# Patient Record
Sex: FEMALE | Race: ASIAN | NOT HISPANIC OR LATINO | URBAN - METROPOLITAN AREA
[De-identification: names, ages, dates, MRNs, and addresses within clinical notes are randomized per-mention and may not be internally consistent; named-entity substitution may affect disease eponyms.]

---

## 2018-02-13 ENCOUNTER — OUTPATIENT (OUTPATIENT)
Dept: OUTPATIENT SERVICES | Facility: HOSPITAL | Age: 63
LOS: 1 days | Discharge: HOME | End: 2018-02-13
Payer: COMMERCIAL

## 2018-02-13 VITALS
TEMPERATURE: 98 F | HEART RATE: 68 BPM | DIASTOLIC BLOOD PRESSURE: 69 MMHG | SYSTOLIC BLOOD PRESSURE: 120 MMHG | OXYGEN SATURATION: 100 % | RESPIRATION RATE: 18 BRPM | WEIGHT: 261.47 LBS | HEIGHT: 68 IN

## 2018-02-13 DIAGNOSIS — G47.33 OBSTRUCTIVE SLEEP APNEA (ADULT) (PEDIATRIC): ICD-10-CM

## 2018-02-13 DIAGNOSIS — I10 ESSENTIAL (PRIMARY) HYPERTENSION: ICD-10-CM

## 2018-02-13 DIAGNOSIS — M16.12 UNILATERAL PRIMARY OSTEOARTHRITIS, LEFT HIP: ICD-10-CM

## 2018-02-13 DIAGNOSIS — E78.5 HYPERLIPIDEMIA, UNSPECIFIED: ICD-10-CM

## 2018-02-13 DIAGNOSIS — Z98.890 OTHER SPECIFIED POSTPROCEDURAL STATES: Chronic | ICD-10-CM

## 2018-02-13 DIAGNOSIS — C50.919 MALIGNANT NEOPLASM OF UNSPECIFIED SITE OF UNSPECIFIED FEMALE BREAST: ICD-10-CM

## 2018-02-13 DIAGNOSIS — Z01.818 ENCOUNTER FOR OTHER PREPROCEDURAL EXAMINATION: ICD-10-CM

## 2018-02-13 DIAGNOSIS — E66.01 MORBID (SEVERE) OBESITY DUE TO EXCESS CALORIES: ICD-10-CM

## 2018-02-13 LAB
ANION GAP SERPL CALC-SCNC: 8 MMOL/L — SIGNIFICANT CHANGE UP (ref 5–17)
APTT BLD: 40 SEC — HIGH (ref 27.5–37.4)
BASOPHILS # BLD AUTO: 0.02 K/UL — SIGNIFICANT CHANGE UP (ref 0–0.2)
BASOPHILS NFR BLD AUTO: 0.4 % — SIGNIFICANT CHANGE UP (ref 0–2)
BUN SERPL-MCNC: 32 MG/DL — HIGH (ref 7–23)
CALCIUM SERPL-MCNC: 9.2 MG/DL — SIGNIFICANT CHANGE UP (ref 8.5–10.1)
CHLORIDE SERPL-SCNC: 108 MMOL/L — SIGNIFICANT CHANGE UP (ref 96–108)
CO2 SERPL-SCNC: 27 MMOL/L — SIGNIFICANT CHANGE UP (ref 22–31)
CREAT SERPL-MCNC: 1.66 MG/DL — HIGH (ref 0.5–1.3)
EOSINOPHIL # BLD AUTO: 0.06 K/UL — SIGNIFICANT CHANGE UP (ref 0–0.5)
EOSINOPHIL NFR BLD AUTO: 1.2 % — SIGNIFICANT CHANGE UP (ref 0–6)
GLUCOSE SERPL-MCNC: 94 MG/DL — SIGNIFICANT CHANGE UP (ref 70–99)
HBA1C BLD-MCNC: 5.6 % — SIGNIFICANT CHANGE UP (ref 4–5.6)
HCT VFR BLD CALC: 39.5 % — SIGNIFICANT CHANGE UP (ref 34.5–45)
HGB BLD-MCNC: 13.2 G/DL — SIGNIFICANT CHANGE UP (ref 11.5–15.5)
IMM GRANULOCYTES NFR BLD AUTO: 0.2 % — SIGNIFICANT CHANGE UP (ref 0–1.5)
INR BLD: 1.13 RATIO — SIGNIFICANT CHANGE UP (ref 0.88–1.16)
LYMPHOCYTES # BLD AUTO: 1.34 K/UL — SIGNIFICANT CHANGE UP (ref 1–3.3)
LYMPHOCYTES # BLD AUTO: 27.7 % — SIGNIFICANT CHANGE UP (ref 13–44)
MCHC RBC-ENTMCNC: 29.9 PG — SIGNIFICANT CHANGE UP (ref 27–34)
MCHC RBC-ENTMCNC: 33.4 GM/DL — SIGNIFICANT CHANGE UP (ref 32–36)
MCV RBC AUTO: 89.4 FL — SIGNIFICANT CHANGE UP (ref 80–100)
MONOCYTES # BLD AUTO: 0.67 K/UL — SIGNIFICANT CHANGE UP (ref 0–0.9)
MONOCYTES NFR BLD AUTO: 13.9 % — SIGNIFICANT CHANGE UP (ref 2–14)
NEUTROPHILS # BLD AUTO: 2.73 K/UL — SIGNIFICANT CHANGE UP (ref 1.8–7.4)
NEUTROPHILS NFR BLD AUTO: 56.6 % — SIGNIFICANT CHANGE UP (ref 43–77)
PLATELET # BLD AUTO: 199 K/UL — SIGNIFICANT CHANGE UP (ref 150–400)
POTASSIUM SERPL-MCNC: 3.2 MMOL/L — LOW (ref 3.5–5.3)
POTASSIUM SERPL-SCNC: 3.2 MMOL/L — LOW (ref 3.5–5.3)
PROTHROM AB SERPL-ACNC: 12.4 SEC — SIGNIFICANT CHANGE UP (ref 9.8–12.7)
RBC # BLD: 4.42 M/UL — SIGNIFICANT CHANGE UP (ref 3.8–5.2)
RBC # FLD: 14 % — SIGNIFICANT CHANGE UP (ref 10.3–14.5)
SODIUM SERPL-SCNC: 143 MMOL/L — SIGNIFICANT CHANGE UP (ref 135–145)
WBC # BLD: 4.83 K/UL — SIGNIFICANT CHANGE UP (ref 3.8–10.5)
WBC # FLD AUTO: 4.83 K/UL — SIGNIFICANT CHANGE UP (ref 3.8–10.5)

## 2018-02-13 PROCEDURE — 93010 ELECTROCARDIOGRAM REPORT: CPT | Mod: NC

## 2018-02-13 NOTE — OCCUPATIONAL THERAPY INITIAL EVALUATION ADULT - COORDINATION ASSESSED, REHAB EVAL
heel to shin/finger to nose/intact, diminished in LLE intact, diminished in LLE/finger to nose/heel to shin

## 2018-02-13 NOTE — PATIENT PROFILE ADULT. - NS PRO CONTRA FLU 1
Problem: Patient Care Overview  Goal: Plan of Care Review  Outcome: Ongoing (interventions implemented as appropriate)  Infant remains in open crib with stable axillary temperatures.  VSS on RA.  No parental contact so far this shift.       yes

## 2018-02-13 NOTE — PHYSICAL THERAPY INITIAL EVALUATION ADULT - TINETTI BALANCE TEST, REHAB EVAL
Sitting Balance - 1/1 , Rises From Chair - 1/2, Attempts to rise - 1/2 , Immediate Standing Balance - 1/2,  Standing Balance - 1/2, Nudged -  1/2, Eyes Closed -1 /1,  Turning 360 Deg - 1 /2, Sitting down - 1/2, Balance Score -9 /16

## 2018-02-13 NOTE — PATIENT PROFILE ADULT. - VISION (WITH CORRECTIVE LENSES IF THE PATIENT USUALLY WEARS THEM):
Partially impaired: cannot see medication labels or newsprint, but can see obstacles in path, and the surrounding layout; can count fingers at arm's length/Use  glasses

## 2018-02-13 NOTE — PATIENT PROFILE ADULT. - PMH
HTN (hypertension)    Morbid obesity  BMI 46  DAMASO (obstructive sleep apnea)  worse in past has had  100  pound weight loss ; refuses CPAP

## 2018-02-13 NOTE — OCCUPATIONAL THERAPY INITIAL EVALUATION ADULT - GENERAL OBSERVATIONS, REHAB EVAL
Chart reviewed. Patient encountered seated in recliner chair in [facility] waiting area with NAD. Patient underwent occupational therapy pre-operative consultation to determine current functional ADL limitations in order to provide the right equipment for patient to perform functional ADL post operation.

## 2018-02-13 NOTE — OCCUPATIONAL THERAPY INITIAL EVALUATION ADULT - SOCIAL CONCERNS
Complex psychosocial needs/coping issues/Pt voiced concerns about her recovery  at  home ;her  works and will not be available to assist her during the day ,  a baby in the house with  be able to assist her intermittent  upon discharge home postoperatively . Complex psychosocial needs/coping issues/Pt voiced concerns about her recovery at home ; her  works and will not be available to assist her during the days;  a baby in the house with  be able to assist her intermittent upon discharge home postoperatively .

## 2018-02-13 NOTE — PHYSICAL THERAPY INITIAL EVALUATION ADULT - GAIT DEVIATIONS NOTED, PT EVAL
decreased step length/decreased velocity of limb motion/decreased stride length/decreased weight-shifting ability/decreased etelvina

## 2018-02-13 NOTE — OCCUPATIONAL THERAPY INITIAL EVALUATION ADULT - TRANSFER SAFETY CONCERNS NOTED: BED/CHAIR, REHAB EVAL
decreased sequencing ability/stand pivot/decreased step length/decreased balance during turns/squat pivot/decreased weight-shifting ability

## 2018-02-13 NOTE — OCCUPATIONAL THERAPY INITIAL EVALUATION ADULT - ADDITIONAL COMMENTS
Pt is functioning in her roles, self sufficient & ambulating independently with a single axis cane . Pt c/o  8/10 pain  in her left hip; pt takes Diclofenac PRN  for pain. Pt reported she has difficulty  walking , climbing stair , dressing lower body  and standing to  cook and do household chores. Pt scores 80% of patient specific scale

## 2018-02-13 NOTE — H&P PST ADULT - PMH
Breast cancer  s/p lumpectomy  HTN (hypertension)    Morbid obesity  BMI 46  DAMASO (obstructive sleep apnea)  worse in past has had  100  pound weight loss ; refuses CPAP

## 2018-02-13 NOTE — PHYSICAL THERAPY INITIAL EVALUATION ADULT - TINETTI GAIT TEST, REHAB EVAL
Indication of gait -0/1,   Step Length and height -1/2,   Foot Clearance -1/2,   Step Symmetry -0 /1,  Step Continuity -11,   Path -1/ 2,   Trunk 1/2,   Walking Time -1/1,   Total Score -5 /12

## 2018-02-13 NOTE — PHYSICAL THERAPY INITIAL EVALUATION ADULT - CRITERIA FOR SKILLED THERAPEUTIC INTERVENTIONS
functional limitations in following categories/risk reduction/prevention/impairments found/rehab potential/therapy frequency/anticipated discharge recommendation/predicted duration of therapy intervention/anticipated equipment needs at discharge

## 2018-02-13 NOTE — OCCUPATIONAL THERAPY INITIAL EVALUATION ADULT - RANGE OF MOTION EXAMINATION, LOWER EXTREMITY
Left LE Passive ROM was WFL (w/i functional limits)/Right LE Active ROM was WNL(within normal limits)/Right LE Passive ROM was WNL (within normal limits)/Left LE Active Assistive ROM was WFL (within functional limits)

## 2018-02-13 NOTE — PHYSICAL THERAPY INITIAL EVALUATION ADULT - ADDITIONAL COMMENTS
Pt used a straight cane and assist  for ambulation prior to admission. Pt has 5 steps c B rails, far apart, to get into house and one flight of stairs c R rail to go up to the second floor.

## 2018-02-13 NOTE — PHYSICAL THERAPY INITIAL EVALUATION ADULT - IMPAIRMENTS CONTRIBUTING TO GAIT DEVIATIONS, PT EVAL
decreased strength/narrow base of support/impaired postural control/impaired balance/decreased flexibility/pain

## 2018-02-13 NOTE — PHYSICAL THERAPY INITIAL EVALUATION ADULT - MODIFIED CLINICAL TEST OF SENSORY INTEGRATION IN BALANCE TEST
Barthel Index: Feeding Score 10_/10__, Bathing Score 5_/5__, Grooming Score5 _/5__, Dressing Score 5_/10__, Bowels Score _10_/10_, Bladder Score _10/10__, Toilet Score _10/10__, Transfers Score 10_/15__, Mobility Score _0_/15_, Stairs Score 0_/10__,     Total Score __60_/100

## 2018-02-13 NOTE — OCCUPATIONAL THERAPY INITIAL EVALUATION ADULT - LIVES WITH, PROFILE
spouse/in a private house  with 5 entry steps with wide, bilateral hand rails . Onc inside , pt  has a flight of 11 steps to get to her bedroom and bathroom  on the second  floor. The bathroom  has a tub,  standard toilet  and it is not equipped with  grab bars or shower chair.  Pt has no DME at  home except a SAC . in a private house with 5 wide entry steps with, bilateral hand rails. Once inside ; pt has a flight of 11 steps to get to her bedroom and bathroom on the second floor. The bathroom has a tub, shower standard toilet and it is not equipped with grab bars or shower chair.  Pt has no DME at  home except a SAC ./spouse

## 2018-02-14 LAB
MRSA PCR RESULT.: SIGNIFICANT CHANGE UP
S AUREUS DNA NOSE QL NAA+PROBE: SIGNIFICANT CHANGE UP

## 2018-02-27 ENCOUNTER — INPATIENT (INPATIENT)
Facility: HOSPITAL | Age: 63
LOS: 1 days | Discharge: HOME | End: 2018-03-01
Attending: ORTHOPAEDIC SURGERY | Admitting: ORTHOPAEDIC SURGERY
Payer: COMMERCIAL

## 2018-02-27 VITALS
WEIGHT: 257.06 LBS | HEIGHT: 68 IN | DIASTOLIC BLOOD PRESSURE: 75 MMHG | RESPIRATION RATE: 18 BRPM | TEMPERATURE: 97 F | SYSTOLIC BLOOD PRESSURE: 125 MMHG | OXYGEN SATURATION: 99 % | HEART RATE: 81 BPM

## 2018-02-27 DIAGNOSIS — M16.12 UNILATERAL PRIMARY OSTEOARTHRITIS, LEFT HIP: ICD-10-CM

## 2018-02-27 DIAGNOSIS — G47.33 OBSTRUCTIVE SLEEP APNEA (ADULT) (PEDIATRIC): ICD-10-CM

## 2018-02-27 DIAGNOSIS — Z98.890 OTHER SPECIFIED POSTPROCEDURAL STATES: Chronic | ICD-10-CM

## 2018-02-27 DIAGNOSIS — E66.01 MORBID (SEVERE) OBESITY DUE TO EXCESS CALORIES: ICD-10-CM

## 2018-02-27 DIAGNOSIS — I10 ESSENTIAL (PRIMARY) HYPERTENSION: ICD-10-CM

## 2018-02-27 LAB
ANION GAP SERPL CALC-SCNC: 7 MMOL/L — SIGNIFICANT CHANGE UP (ref 5–17)
BLD GP AB SCN SERPL QL: SIGNIFICANT CHANGE UP
BUN SERPL-MCNC: 9 MG/DL — SIGNIFICANT CHANGE UP (ref 7–23)
CALCIUM SERPL-MCNC: 8.5 MG/DL — SIGNIFICANT CHANGE UP (ref 8.5–10.1)
CHLORIDE SERPL-SCNC: 110 MMOL/L — HIGH (ref 96–108)
CO2 SERPL-SCNC: 26 MMOL/L — SIGNIFICANT CHANGE UP (ref 22–31)
CREAT SERPL-MCNC: 1.04 MG/DL — SIGNIFICANT CHANGE UP (ref 0.5–1.3)
GLUCOSE SERPL-MCNC: 107 MG/DL — HIGH (ref 70–99)
HCT VFR BLD CALC: 35.9 % — SIGNIFICANT CHANGE UP (ref 34.5–45)
HGB BLD-MCNC: 11.9 G/DL — SIGNIFICANT CHANGE UP (ref 11.5–15.5)
MCHC RBC-ENTMCNC: 30.2 PG — SIGNIFICANT CHANGE UP (ref 27–34)
MCHC RBC-ENTMCNC: 33.1 GM/DL — SIGNIFICANT CHANGE UP (ref 32–36)
MCV RBC AUTO: 91.1 FL — SIGNIFICANT CHANGE UP (ref 80–100)
NRBC # BLD: 0 /100 WBCS — SIGNIFICANT CHANGE UP (ref 0–0)
PLATELET # BLD AUTO: 169 K/UL — SIGNIFICANT CHANGE UP (ref 150–400)
POTASSIUM SERPL-MCNC: 3.7 MMOL/L — SIGNIFICANT CHANGE UP (ref 3.5–5.3)
POTASSIUM SERPL-SCNC: 3.7 MMOL/L — SIGNIFICANT CHANGE UP (ref 3.5–5.3)
RBC # BLD: 3.94 M/UL — SIGNIFICANT CHANGE UP (ref 3.8–5.2)
RBC # FLD: 14.4 % — SIGNIFICANT CHANGE UP (ref 10.3–14.5)
SODIUM SERPL-SCNC: 143 MMOL/L — SIGNIFICANT CHANGE UP (ref 135–145)
WBC # BLD: 8.88 K/UL — SIGNIFICANT CHANGE UP (ref 3.8–10.5)
WBC # FLD AUTO: 8.88 K/UL — SIGNIFICANT CHANGE UP (ref 3.8–10.5)

## 2018-02-27 PROCEDURE — 88311 DECALCIFY TISSUE: CPT | Mod: 26

## 2018-02-27 PROCEDURE — 99223 1ST HOSP IP/OBS HIGH 75: CPT

## 2018-02-27 PROCEDURE — 72170 X-RAY EXAM OF PELVIS: CPT | Mod: 26,76

## 2018-02-27 PROCEDURE — 88305 TISSUE EXAM BY PATHOLOGIST: CPT | Mod: 26

## 2018-02-27 RX ORDER — SODIUM CHLORIDE 9 MG/ML
1000 INJECTION INTRAMUSCULAR; INTRAVENOUS; SUBCUTANEOUS
Qty: 0 | Refills: 0 | Status: DISCONTINUED | OUTPATIENT
Start: 2018-02-27 | End: 2018-02-28

## 2018-02-27 RX ORDER — CELECOXIB 200 MG/1
200 CAPSULE ORAL
Qty: 0 | Refills: 0 | Status: DISCONTINUED | OUTPATIENT
Start: 2018-02-28 | End: 2018-03-01

## 2018-02-27 RX ORDER — ONDANSETRON 8 MG/1
4 TABLET, FILM COATED ORAL EVERY 6 HOURS
Qty: 0 | Refills: 0 | Status: DISCONTINUED | OUTPATIENT
Start: 2018-02-27 | End: 2018-03-01

## 2018-02-27 RX ORDER — ASCORBIC ACID 60 MG
500 TABLET,CHEWABLE ORAL
Qty: 0 | Refills: 0 | Status: DISCONTINUED | OUTPATIENT
Start: 2018-02-27 | End: 2018-03-01

## 2018-02-27 RX ORDER — SENNA PLUS 8.6 MG/1
2 TABLET ORAL AT BEDTIME
Qty: 0 | Refills: 0 | Status: DISCONTINUED | OUTPATIENT
Start: 2018-02-27 | End: 2018-03-01

## 2018-02-27 RX ORDER — TRANEXAMIC ACID 100 MG/ML
1000 INJECTION, SOLUTION INTRAVENOUS ONCE
Qty: 0 | Refills: 0 | Status: COMPLETED | OUTPATIENT
Start: 2018-02-27 | End: 2018-02-27

## 2018-02-27 RX ORDER — CELECOXIB 200 MG/1
200 CAPSULE ORAL ONCE
Qty: 0 | Refills: 0 | Status: COMPLETED | OUTPATIENT
Start: 2018-02-27 | End: 2018-02-27

## 2018-02-27 RX ORDER — TRIAMTERENE/HYDROCHLOROTHIAZID 75 MG-50MG
1 TABLET ORAL DAILY
Qty: 0 | Refills: 0 | Status: DISCONTINUED | OUTPATIENT
Start: 2018-02-27 | End: 2018-03-01

## 2018-02-27 RX ORDER — ACETAMINOPHEN 500 MG
650 TABLET ORAL ONCE
Qty: 0 | Refills: 0 | Status: COMPLETED | OUTPATIENT
Start: 2018-02-27 | End: 2018-02-27

## 2018-02-27 RX ORDER — ACETAMINOPHEN 500 MG
650 TABLET ORAL EVERY 6 HOURS
Qty: 0 | Refills: 0 | Status: DISCONTINUED | OUTPATIENT
Start: 2018-02-27 | End: 2018-03-01

## 2018-02-27 RX ORDER — OXYCODONE HYDROCHLORIDE 5 MG/1
10 TABLET ORAL ONCE
Qty: 0 | Refills: 0 | Status: DISCONTINUED | OUTPATIENT
Start: 2018-02-27 | End: 2018-02-27

## 2018-02-27 RX ORDER — PANTOPRAZOLE SODIUM 20 MG/1
40 TABLET, DELAYED RELEASE ORAL DAILY
Qty: 0 | Refills: 0 | Status: DISCONTINUED | OUTPATIENT
Start: 2018-02-27 | End: 2018-03-01

## 2018-02-27 RX ORDER — ATORVASTATIN CALCIUM 80 MG/1
20 TABLET, FILM COATED ORAL AT BEDTIME
Qty: 0 | Refills: 0 | Status: DISCONTINUED | OUTPATIENT
Start: 2018-02-27 | End: 2018-03-01

## 2018-02-27 RX ORDER — SODIUM CHLORIDE 9 MG/ML
1000 INJECTION, SOLUTION INTRAVENOUS
Qty: 0 | Refills: 0 | Status: DISCONTINUED | OUTPATIENT
Start: 2018-02-27 | End: 2018-02-27

## 2018-02-27 RX ORDER — MAGNESIUM HYDROXIDE 400 MG/1
30 TABLET, CHEWABLE ORAL AT BEDTIME
Qty: 0 | Refills: 0 | Status: DISCONTINUED | OUTPATIENT
Start: 2018-02-27 | End: 2018-03-01

## 2018-02-27 RX ORDER — OXYCODONE HYDROCHLORIDE 5 MG/1
5 TABLET ORAL EVERY 4 HOURS
Qty: 0 | Refills: 0 | Status: DISCONTINUED | OUTPATIENT
Start: 2018-02-27 | End: 2018-03-01

## 2018-02-27 RX ORDER — FOLIC ACID 0.8 MG
1 TABLET ORAL DAILY
Qty: 0 | Refills: 0 | Status: DISCONTINUED | OUTPATIENT
Start: 2018-02-27 | End: 2018-03-01

## 2018-02-27 RX ORDER — FERROUS SULFATE 325(65) MG
325 TABLET ORAL
Qty: 0 | Refills: 0 | Status: DISCONTINUED | OUTPATIENT
Start: 2018-02-27 | End: 2018-03-01

## 2018-02-27 RX ORDER — DOCUSATE SODIUM 100 MG
100 CAPSULE ORAL THREE TIMES A DAY
Qty: 0 | Refills: 0 | Status: DISCONTINUED | OUTPATIENT
Start: 2018-02-27 | End: 2018-03-01

## 2018-02-27 RX ORDER — FENTANYL CITRATE 50 UG/ML
25 INJECTION INTRAVENOUS
Qty: 0 | Refills: 0 | Status: DISCONTINUED | OUTPATIENT
Start: 2018-02-27 | End: 2018-02-27

## 2018-02-27 RX ORDER — ASPIRIN/CALCIUM CARB/MAGNESIUM 324 MG
325 TABLET ORAL
Qty: 0 | Refills: 0 | Status: DISCONTINUED | OUTPATIENT
Start: 2018-02-28 | End: 2018-03-01

## 2018-02-27 RX ORDER — VANCOMYCIN HCL 1 G
1000 VIAL (EA) INTRAVENOUS ONCE
Qty: 0 | Refills: 0 | Status: COMPLETED | OUTPATIENT
Start: 2018-02-27 | End: 2018-02-27

## 2018-02-27 RX ORDER — FENTANYL CITRATE 50 UG/ML
50 INJECTION INTRAVENOUS
Qty: 0 | Refills: 0 | Status: DISCONTINUED | OUTPATIENT
Start: 2018-02-27 | End: 2018-02-27

## 2018-02-27 RX ORDER — MORPHINE SULFATE 50 MG/1
4 CAPSULE, EXTENDED RELEASE ORAL ONCE
Qty: 0 | Refills: 0 | Status: DISCONTINUED | OUTPATIENT
Start: 2018-02-27 | End: 2018-03-01

## 2018-02-27 RX ORDER — POLYETHYLENE GLYCOL 3350 17 G/17G
17 POWDER, FOR SOLUTION ORAL DAILY
Qty: 0 | Refills: 0 | Status: DISCONTINUED | OUTPATIENT
Start: 2018-02-27 | End: 2018-03-01

## 2018-02-27 RX ORDER — OXYCODONE HYDROCHLORIDE 5 MG/1
10 TABLET ORAL EVERY 4 HOURS
Qty: 0 | Refills: 0 | Status: DISCONTINUED | OUTPATIENT
Start: 2018-02-27 | End: 2018-03-01

## 2018-02-27 RX ADMIN — SODIUM CHLORIDE 100 MILLILITER(S): 9 INJECTION, SOLUTION INTRAVENOUS at 11:10

## 2018-02-27 RX ADMIN — OXYCODONE HYDROCHLORIDE 10 MILLIGRAM(S): 5 TABLET ORAL at 15:38

## 2018-02-27 RX ADMIN — Medication 500 MILLIGRAM(S): at 20:54

## 2018-02-27 RX ADMIN — Medication 650 MILLIGRAM(S): at 20:54

## 2018-02-27 RX ADMIN — ATORVASTATIN CALCIUM 20 MILLIGRAM(S): 80 TABLET, FILM COATED ORAL at 21:40

## 2018-02-27 RX ADMIN — OXYCODONE HYDROCHLORIDE 10 MILLIGRAM(S): 5 TABLET ORAL at 07:35

## 2018-02-27 RX ADMIN — CELECOXIB 200 MILLIGRAM(S): 200 CAPSULE ORAL at 07:36

## 2018-02-27 RX ADMIN — Medication 650 MILLIGRAM(S): at 07:35

## 2018-02-27 RX ADMIN — Medication 100 MILLIGRAM(S): at 21:40

## 2018-02-27 RX ADMIN — TRANEXAMIC ACID 220 MILLIGRAM(S): 100 INJECTION, SOLUTION INTRAVENOUS at 11:10

## 2018-02-27 RX ADMIN — OXYCODONE HYDROCHLORIDE 10 MILLIGRAM(S): 5 TABLET ORAL at 15:17

## 2018-02-27 RX ADMIN — Medication 650 MILLIGRAM(S): at 20:53

## 2018-02-27 RX ADMIN — SODIUM CHLORIDE 100 MILLILITER(S): 9 INJECTION INTRAMUSCULAR; INTRAVENOUS; SUBCUTANEOUS at 15:38

## 2018-02-27 RX ADMIN — Medication 250 MILLIGRAM(S): at 20:30

## 2018-02-27 RX ADMIN — Medication 325 MILLIGRAM(S): at 20:53

## 2018-02-27 NOTE — PHYSICAL THERAPY INITIAL EVALUATION ADULT - GENERAL OBSERVATIONS, REHAB EVAL
Found supine,alert, abduction pillow, IV line, on room air, no c/o pain but numbness in the left leg.

## 2018-02-27 NOTE — CONSULT NOTE ADULT - SUBJECTIVE AND OBJECTIVE BOX
HPI: 61 yo F obese, h/o breast CA s/p lumpectomy, HTN, DAMASO (does not use a CPAP), OA s/p left THR, pain in control, no n/v      PAST MEDICAL & SURGICAL HISTORY:  Breast cancer: s/p lumpectomy  DAMASO (obstructive sleep apnea): worse in past has had  100  pound weight loss ; refuses CPAP  Morbid obesity: BMI 46  HTN (hypertension)  S/P lumpectomy, left breast: 2 years ago      REVIEW OF SYSTEMS:    CONSTITUTIONAL: No fever, weight loss, or fatigue  EYES: No eye pain, visual disturbances, or discharge  ENMT:  No difficulty hearing, tinnitus, vertigo; No sinus or throat pain  RESPIRATORY: No cough, wheezing, chills or hemoptysis; No shortness of breath  CARDIOVASCULAR: No chest pain, palpitations, dizziness, or leg swelling  GASTROINTESTINAL: No abdominal or epigastric pain. No nausea, vomiting, or hematemesis; No diarrhea or constipation. No melena or hematochezia.  GENITOURINARY: No dysuria, frequency, hematuria, or incontinence  NEUROLOGICAL: No headaches, memory loss, loss of strength, numbness, or tremors  ENDOCRINE: No heat or cold intolerance; No hair loss  MUSCULOSKELETAL: No joint pain or swelling; No muscle, back, hip pain in control  PSYCHIATRIC: No depression, anxiety, mood swings, or difficulty sleeping        MEDICATIONS  (STANDING):  acetaminophen   Tablet 650 milliGRAM(s) Oral every 6 hours  ascorbic acid 500 milliGRAM(s) Oral two times a day  atorvastatin 20 milliGRAM(s) Oral at bedtime  celecoxib 200 milliGRAM(s) Oral two times a day before meals  docusate sodium 100 milliGRAM(s) Oral three times a day  ferrous    sulfate 325 milliGRAM(s) Oral three times a day with meals  folic acid 1 milliGRAM(s) Oral daily  morphine  - Injectable 4 milliGRAM(s) IV Push once  multivitamin 1 Tablet(s) Oral daily  pantoprazole    Tablet 40 milliGRAM(s) Oral daily  polyethylene glycol 3350 17 Gram(s) Oral daily  sodium chloride 0.9%. 1000 milliLiter(s) (100 mL/Hr) IV Continuous <Continuous>  triamterene 37.5 mG/hydrochlorothiazide 25 mG Capsule 1 Capsule(s) Oral daily    MEDICATIONS  (PRN):  acetaminophen   Tablet 650 milliGRAM(s) Oral every 6 hours PRN For Temp over 38.3 C (100.94 F)  aluminum hydroxide/magnesium hydroxide/simethicone Suspension 30 milliLiter(s) Oral four times a day PRN Indigestion  magnesium hydroxide Suspension 30 milliLiter(s) Oral at bedtime PRN Constipation  ondansetron Injectable 4 milliGRAM(s) IV Push every 6 hours PRN Nausea and/or Vomiting  oxyCODONE    IR 5 milliGRAM(s) Oral every 4 hours PRN Mild Pain  oxyCODONE    IR 10 milliGRAM(s) Oral every 4 hours PRN Moderate Pain  senna 2 Tablet(s) Oral at bedtime PRN Constipation      Allergies    penicillins (Hives)    Intolerances        SOCIAL HISTORY:   , lives with spouse, occ. wine, no smoking    FAMILY HISTORY:  Family history of multiple myeloma (Sibling)  Family history of breast cancer in sister (Sibling)  Family history of sepsis (Mother)      Vital Signs Last 24 Hrs  T(C): 36.7 (27 Feb 2018 15:00), Max: 36.7 (27 Feb 2018 15:00)  T(F): 98 (27 Feb 2018 15:00), Max: 98 (27 Feb 2018 15:00)  HR: 66 (27 Feb 2018 16:45) (52 - 81)  BP: 104/67 (27 Feb 2018 16:45) (104/67 - 137/77)  BP(mean): --  RR: 16 (27 Feb 2018 16:45) (12 - 20)  SpO2: 97% (27 Feb 2018 16:45) (96% - 100%)    PHYSICAL EXAM:    GENERAL: NAD, well-groomed, obese  HEAD:  Atraumatic, Normocephalic  EYES: EOMI, PERRLA, conjunctiva and sclera clear  ENMT: No tonsillar erythema, exudates, or enlargement; Moist mucous membranes,   NECK: Supple, No JVD, Normal thyroid  NERVOUS SYSTEM:  Alert & Oriented X3, Good concentration; Motor Strength 5/5 B/L upper and lower extremities; DTRs 2+ intact and symmetric  CHEST/LUNG: Clear to percussion bilaterally; No rales, rhonchi, wheezing, or rubs  HEART: Regular rate and rhythm; No murmurs, rubs, or gallops  ABDOMEN: Soft, Nontender, Nondistended; Bowel sounds present  EXTREMITIES:  2+ Peripheral Pulses, No clubbing, cyanosis,hip c/d/i  SKIN: No rashes or lesions      LABS:                        11.9   8.88  )-----------( 169      ( 27 Feb 2018 11:08 )             35.9     02-27    143  |  110<H>  |  9   ----------------------------<  107<H>  3.7   |  26  |  1.04    Ca    8.5      27 Feb 2018 11:08            RADIOLOGY & ADDITIONAL STUDIES:

## 2018-02-27 NOTE — CONSULT NOTE ADULT - PROBLEM SELECTOR RECOMMENDATION 9
c/w pain management and bowel regimen  OT/PT  dvt ppx  incentive spirometer  monitor h/h  zofran prn for nausea

## 2018-02-27 NOTE — DISCHARGE NOTE ADULT - MEDICATION SUMMARY - MEDICATIONS TO TAKE
I will START or STAY ON the medications listed below when I get home from the hospital:    celecoxib 200 mg oral capsule  -- 1 cap(s) by mouth 2 times a day (before meals) MDD:2 Tabs  -- Indication: For LEFT TOTAL HIP REPLACEMENT    oxyCODONE 10 mg oral tablet  -- 1 tab(s) by mouth every 4 hours, As needed, Moderate Pain MDD:6 Tabs  -- Indication: For LEFT TOTAL HIP REPLACEMENT    aspirin 325 mg oral delayed release tablet  -- 1 tab(s) by mouth 2 times a day MDD:2 Tabs  -- Indication: For LEFT TOTAL HIP REPLACEMENT    magnesium hydroxide 8% oral suspension  -- 30 milliliter(s) by mouth once a day (at bedtime), As needed, Constipation  -- Indication: For LEFT TOTAL HIP REPLACEMENT    atorvastatin 20 mg oral tablet  -- 1 tab(s) by mouth once a day  -- Indication: For LEFT TOTAL HIP REPLACEMENT    triamterene-hydrochlorothiazide 37.5mg-25mg oral capsule  -- 1 cap(s) by mouth once a day  -- Indication: For LEFT TOTAL HIP REPLACEMENT    docusate sodium 100 mg oral capsule  -- 1 cap(s) by mouth 3 times a day  -- Indication: For LEFT TOTAL HIP REPLACEMENT    pantoprazole 40 mg oral delayed release tablet  -- 1 tab(s) by mouth once a day MDD:1 Tab  -- Indication: For LEFT TOTAL HIP REPLACEMENT    Multiple Vitamins oral tablet  -- 1 tab(s) by mouth once a day  -- Indication: For LEFT TOTAL HIP REPLACEMENT    ascorbic acid 500 mg oral tablet  -- 1 tab(s) by mouth 2 times a day  -- Indication: For LEFT TOTAL HIP REPLACEMENT

## 2018-02-27 NOTE — DISCHARGE NOTE ADULT - NS AS DC FOLLOWUP STROKE INST
Take your medications exactly as prescribed. Having your pain under control will help increase activity, improve deep breathing and coughing and prevent complications like pneumonia and blood clots in your legs. Some of the common side effects of pain medications are nausea, vomiting, itching, rash and upset stomach. Contact your doctor if you develop these or any other unusual systoms. Eat a diet rich in fiber and drink plenty of oral fluids. Use other pain management methods like, cold and warm applications, elevation of affected body part, listening to music, watching TV, yoga, etc.Do not take any other medications unless approved by your doctor. Do not drive, operate machinery, drink alcohol, or make any important decisions while taking narcotic pain medications. Store medication in its original bottle, in a locked cabinet away from the reach of children. Dispose of any unused and  medication safely.

## 2018-02-27 NOTE — PATIENT PROFILE ADULT. - VISION (WITH CORRECTIVE LENSES IF THE PATIENT USUALLY WEARS THEM):
Use  glasses/Partially impaired: cannot see medication labels or newsprint, but can see obstacles in path, and the surrounding layout; can count fingers at arm's length

## 2018-02-27 NOTE — DISCHARGE NOTE ADULT - HOSPITAL COURSE
63yo Female admitted s/p elective total left hip arthoplasty. Patient tolerated procedure well, no acute intraoperative complications.   Patient was transferred to PACU then subsequently to the floors in stable condition.   Patient did well postoperatively, vitals/labs remain stable, tolerated advancing diet. Evaluated by PT/Ambulating.   Had a medical evaluation.   Patient was hemodynamically stable at time of discharge once pain adequately controlled and cleared for discharge.

## 2018-02-27 NOTE — PHYSICAL THERAPY INITIAL EVALUATION ADULT - COORDINATION ASSESSED, REHAB EVAL
LLE sluggish movt due to tightness in the left hip muscle/heel to shin heel to shin/LLE sluggish movt due to tightness in the left hip muscle

## 2018-02-27 NOTE — DISCHARGE NOTE ADULT - CARE PROVIDER_API CALL
Sean Oleary), Fritz Melara  [facility]  [address] [place] [address]  Phone: [phone]  Fax: [phone]

## 2018-02-27 NOTE — DISCHARGE NOTE ADULT - MEDICATION SUMMARY - MEDICATIONS TO STOP TAKING
I will STOP taking the medications listed below when I get home from the hospital:    diclofenac sodium 75 mg oral delayed release tablet  -- 1 tab(s) by mouth every 12 hours, As Needed

## 2018-02-27 NOTE — DISCHARGE NOTE ADULT - NS AS ACTIVITY OBS
Do not drive or operate machinery/Walking-Outdoors allowed/Showering allowed/Stairs allowed/No Heavy lifting/straining/Walking-Indoors allowed

## 2018-02-27 NOTE — DISCHARGE NOTE ADULT - PLAN OF CARE
pain free ambulation Please keep prineo clear dressing in place until follow up appointment, keep wound clean, OK to shower, allow water to run over area and pat dry. Continue physical therapy as instructed, activity and weight bearing as tolerated, rest as needed. Posterior hip precautions as instructed. Continue to take stool softener to prevent constipation. May use prescribed pain medication as directed, do not drive while using narcotic pain medications. Aspirin for DVT prophylaxis. Please call the office to make an appointment to follow up with Dr. Oleary in 1-2 weeks, and call MD sooner if any questions, issues or concerns. Return to the ER or call MD if fever >101, excessive bleeding or swelling, or foul smelling drainage or discharge from wound. Resume home medications, and follow up with primary care physician. follow up with primary care physician.

## 2018-02-27 NOTE — DISCHARGE NOTE ADULT - CARE PLAN
Principal Discharge DX:	Primary osteoarthritis of left hip  Goal:	pain free ambulation  Assessment and plan of treatment:	Please keep prineo clear dressing in place until follow up appointment, keep wound clean, OK to shower, allow water to run over area and pat dry. Continue physical therapy as instructed, activity and weight bearing as tolerated, rest as needed. Posterior hip precautions as instructed. Continue to take stool softener to prevent constipation. May use prescribed pain medication as directed, do not drive while using narcotic pain medications. Aspirin for DVT prophylaxis. Please call the office to make an appointment to follow up with Dr. Oleary in 1-2 weeks, and call MD sooner if any questions, issues or concerns. Return to the ER or call MD if fever >101, excessive bleeding or swelling, or foul smelling drainage or discharge from wound.  Secondary Diagnosis:	HTN (hypertension)  Assessment and plan of treatment:	Resume home medications, and follow up with primary care physician.  Secondary Diagnosis:	DAMASO (obstructive sleep apnea)  Assessment and plan of treatment:	follow up with primary care physician.

## 2018-02-27 NOTE — DISCHARGE NOTE ADULT - PATIENT PORTAL LINK FT
You can access the [facility] Patient Portal, offered by [facility], by registering with the following website: [email or web address]

## 2018-02-27 NOTE — BRIEF OPERATIVE NOTE - PROCEDURE
<<-----Click on this checkbox to enter Procedure Total hip arthroplasty  02/27/2018  Posterior approach  Active  JBEHAN

## 2018-02-28 DIAGNOSIS — D72.829 ELEVATED WHITE BLOOD CELL COUNT, UNSPECIFIED: ICD-10-CM

## 2018-02-28 DIAGNOSIS — E87.6 HYPOKALEMIA: ICD-10-CM

## 2018-02-28 LAB
ANION GAP SERPL CALC-SCNC: 12 MMOL/L — SIGNIFICANT CHANGE UP (ref 5–17)
BUN SERPL-MCNC: 13 MG/DL — SIGNIFICANT CHANGE UP (ref 7–23)
CALCIUM SERPL-MCNC: 8.2 MG/DL — LOW (ref 8.5–10.1)
CHLORIDE SERPL-SCNC: 107 MMOL/L — SIGNIFICANT CHANGE UP (ref 96–108)
CO2 SERPL-SCNC: 21 MMOL/L — LOW (ref 22–31)
CREAT SERPL-MCNC: 1.02 MG/DL — SIGNIFICANT CHANGE UP (ref 0.5–1.3)
GLUCOSE SERPL-MCNC: 122 MG/DL — HIGH (ref 70–99)
HCT VFR BLD CALC: 33.4 % — LOW (ref 34.5–45)
HGB BLD-MCNC: 11.4 G/DL — LOW (ref 11.5–15.5)
MCHC RBC-ENTMCNC: 30.9 PG — SIGNIFICANT CHANGE UP (ref 27–34)
MCHC RBC-ENTMCNC: 34.1 GM/DL — SIGNIFICANT CHANGE UP (ref 32–36)
MCV RBC AUTO: 90.5 FL — SIGNIFICANT CHANGE UP (ref 80–100)
NRBC # BLD: 0 /100 WBCS — SIGNIFICANT CHANGE UP (ref 0–0)
PLATELET # BLD AUTO: 175 K/UL — SIGNIFICANT CHANGE UP (ref 150–400)
POTASSIUM SERPL-MCNC: 3.4 MMOL/L — LOW (ref 3.5–5.3)
POTASSIUM SERPL-SCNC: 3.4 MMOL/L — LOW (ref 3.5–5.3)
RBC # BLD: 3.69 M/UL — LOW (ref 3.8–5.2)
RBC # FLD: 14.5 % — SIGNIFICANT CHANGE UP (ref 10.3–14.5)
SODIUM SERPL-SCNC: 140 MMOL/L — SIGNIFICANT CHANGE UP (ref 135–145)
WBC # BLD: 11.11 K/UL — HIGH (ref 3.8–10.5)
WBC # FLD AUTO: 11.11 K/UL — HIGH (ref 3.8–10.5)

## 2018-02-28 PROCEDURE — 99233 SBSQ HOSP IP/OBS HIGH 50: CPT

## 2018-02-28 RX ORDER — SIMETHICONE 80 MG/1
80 TABLET, CHEWABLE ORAL ONCE
Qty: 0 | Refills: 0 | Status: COMPLETED | OUTPATIENT
Start: 2018-02-28 | End: 2018-02-28

## 2018-02-28 RX ORDER — POTASSIUM CHLORIDE 20 MEQ
20 PACKET (EA) ORAL ONCE
Qty: 0 | Refills: 0 | Status: COMPLETED | OUTPATIENT
Start: 2018-02-28 | End: 2018-02-28

## 2018-02-28 RX ADMIN — Medication 650 MILLIGRAM(S): at 11:16

## 2018-02-28 RX ADMIN — Medication 325 MILLIGRAM(S): at 18:14

## 2018-02-28 RX ADMIN — Medication 100 MILLIGRAM(S): at 06:25

## 2018-02-28 RX ADMIN — OXYCODONE HYDROCHLORIDE 10 MILLIGRAM(S): 5 TABLET ORAL at 07:30

## 2018-02-28 RX ADMIN — CELECOXIB 200 MILLIGRAM(S): 200 CAPSULE ORAL at 18:13

## 2018-02-28 RX ADMIN — Medication 650 MILLIGRAM(S): at 00:09

## 2018-02-28 RX ADMIN — Medication 325 MILLIGRAM(S): at 06:25

## 2018-02-28 RX ADMIN — POLYETHYLENE GLYCOL 3350 17 GRAM(S): 17 POWDER, FOR SOLUTION ORAL at 11:18

## 2018-02-28 RX ADMIN — CELECOXIB 200 MILLIGRAM(S): 200 CAPSULE ORAL at 10:30

## 2018-02-28 RX ADMIN — OXYCODONE HYDROCHLORIDE 10 MILLIGRAM(S): 5 TABLET ORAL at 10:59

## 2018-02-28 RX ADMIN — Medication 1 MILLIGRAM(S): at 11:17

## 2018-02-28 RX ADMIN — PANTOPRAZOLE SODIUM 40 MILLIGRAM(S): 20 TABLET, DELAYED RELEASE ORAL at 08:11

## 2018-02-28 RX ADMIN — OXYCODONE HYDROCHLORIDE 10 MILLIGRAM(S): 5 TABLET ORAL at 16:30

## 2018-02-28 RX ADMIN — Medication 100 MILLIGRAM(S): at 21:10

## 2018-02-28 RX ADMIN — Medication 1 CAPSULE(S): at 06:25

## 2018-02-28 RX ADMIN — OXYCODONE HYDROCHLORIDE 10 MILLIGRAM(S): 5 TABLET ORAL at 06:34

## 2018-02-28 RX ADMIN — CELECOXIB 200 MILLIGRAM(S): 200 CAPSULE ORAL at 09:42

## 2018-02-28 RX ADMIN — Medication 100 MILLIGRAM(S): at 15:48

## 2018-02-28 RX ADMIN — Medication 650 MILLIGRAM(S): at 06:25

## 2018-02-28 RX ADMIN — SODIUM CHLORIDE 100 MILLILITER(S): 9 INJECTION INTRAMUSCULAR; INTRAVENOUS; SUBCUTANEOUS at 03:00

## 2018-02-28 RX ADMIN — Medication 500 MILLIGRAM(S): at 18:13

## 2018-02-28 RX ADMIN — SIMETHICONE 80 MILLIGRAM(S): 80 TABLET, CHEWABLE ORAL at 01:23

## 2018-02-28 RX ADMIN — Medication 325 MILLIGRAM(S): at 11:40

## 2018-02-28 RX ADMIN — OXYCODONE HYDROCHLORIDE 10 MILLIGRAM(S): 5 TABLET ORAL at 15:49

## 2018-02-28 RX ADMIN — CELECOXIB 200 MILLIGRAM(S): 200 CAPSULE ORAL at 19:00

## 2018-02-28 RX ADMIN — Medication 1 TABLET(S): at 11:17

## 2018-02-28 RX ADMIN — ATORVASTATIN CALCIUM 20 MILLIGRAM(S): 80 TABLET, FILM COATED ORAL at 21:10

## 2018-02-28 RX ADMIN — Medication 325 MILLIGRAM(S): at 09:42

## 2018-02-28 RX ADMIN — Medication 325 MILLIGRAM(S): at 18:15

## 2018-02-28 RX ADMIN — Medication 650 MILLIGRAM(S): at 18:14

## 2018-02-28 RX ADMIN — Medication 20 MILLIEQUIVALENT(S): at 12:21

## 2018-02-28 RX ADMIN — OXYCODONE HYDROCHLORIDE 10 MILLIGRAM(S): 5 TABLET ORAL at 10:30

## 2018-02-28 RX ADMIN — Medication 500 MILLIGRAM(S): at 06:25

## 2018-02-28 NOTE — CHART NOTE - NSCHARTNOTEFT_GEN_A_CORE
Letter of medical Necessity:    To whom it may concern:    Re:  Jose Waldron  2/27/18  Insurance and ID number:     Dear Sir/:     I am writing to request for wide based medical equipment for the above named patient who has the following diagnoses relevant to this request: S/p left total hip arthroplasty, morbid obestiy, osteoarthritis.    This request is medically necessary for the following reasons: It will assist the individual to achieve or maintain maximum functional capacity in performing daily activities, taking into account both the functional capacity of the individual and those functional capacities that are appropriate for individuals of the same age.     Please let me know if you require additional information from my records.    Best Regards,    Elizabeth Mayes PA-C  Pager #[phone]

## 2018-02-28 NOTE — OCCUPATIONAL THERAPY INITIAL EVALUATION ADULT - PERSONAL SAFETY AND JUDGMENT, REHAB EVAL
intact/however, pt needs reminder to safely adhere to THP with ADL , functional mobility  and during turns to avoid internal rotation of left hip

## 2018-02-28 NOTE — OCCUPATIONAL THERAPY INITIAL EVALUATION ADULT - GENERAL OBSERVATIONS, REHAB EVAL
Pt was seen for initial OT consult encountered supine in bed on cardiac monitoring ; pt was AA&Ox4, cooperative & followed commands. Pt c/o pain, stiffness and in left due to s/p THR. THP were reviewed & maintained. These deficits impact pt's activity tolerance ,balance, ADL management and functional mobility.

## 2018-02-28 NOTE — OCCUPATIONAL THERAPY INITIAL EVALUATION ADULT - PLANNED THERAPY INTERVENTIONS, OT EVAL
bed mobility training/strengthening/stretching/transfer training/energy conservation techniques/IADL retraining/parent/caregiver training.../ADL retraining/balance training/motor coordination training/ROM

## 2018-02-28 NOTE — OCCUPATIONAL THERAPY INITIAL EVALUATION ADULT - LIVES WITH, PROFILE
spouse/and her daughter in a private house with 5 steps to enter with wide, bilateral hand rails. Once inside, pt has a flight of stairs with 11 steps to get to her bedroom and bathroom. The bathroom  has a tub/shower combination , standard toilet and no grab bars

## 2018-02-28 NOTE — OCCUPATIONAL THERAPY INITIAL EVALUATION ADULT - ANTICIPATED DISCHARGE DISPOSITION, OT EVAL
Current every day smoker
Home with 3:1 commode,  rolling walker and OT referral to assist with return to prior level of function. Recommend that [facility] evaluate pt's bathroom for appropriate shower chair.

## 2018-02-28 NOTE — OCCUPATIONAL THERAPY INITIAL EVALUATION ADULT - SOCIAL CONCERNS
Pt voiced concerns  about her recovery at home ; the  for her grand children will be available to assist her PRN upon discharge home ./Complex psychosocial needs/coping issues

## 2018-02-28 NOTE — OCCUPATIONAL THERAPY INITIAL EVALUATION ADULT - PRECAUTIONS/LIMITATIONS, REHAB EVAL
cardiac precautions/fall precautions/obesity precautions/left hip precautions/Pt has diminished reaction time due to age related changes

## 2018-02-28 NOTE — OCCUPATIONAL THERAPY INITIAL EVALUATION ADULT - RANGE OF MOTION EXAMINATION, LOWER EXTREMITY
Right LE Passive ROM was WFL  (within functional limits)/Right LE Active ROM was WFL   (within functional limits)/left hip is 0-45 degrees with  pain

## 2018-02-28 NOTE — PROGRESS NOTE ADULT - PROBLEM SELECTOR PLAN 1
cont with pain management and bowel regimen  H/H stable  dvt ppx  OT/PT  zofran prn for n/v  pt. taking po and bp ok, serum cr ok , will d/c fluids

## 2018-03-01 VITALS
RESPIRATION RATE: 15 BRPM | HEART RATE: 81 BPM | DIASTOLIC BLOOD PRESSURE: 72 MMHG | TEMPERATURE: 98 F | SYSTOLIC BLOOD PRESSURE: 123 MMHG | OXYGEN SATURATION: 95 %

## 2018-03-01 LAB
ANION GAP SERPL CALC-SCNC: 8 MMOL/L — SIGNIFICANT CHANGE UP (ref 5–17)
BUN SERPL-MCNC: 13 MG/DL — SIGNIFICANT CHANGE UP (ref 7–23)
CALCIUM SERPL-MCNC: 8.4 MG/DL — LOW (ref 8.5–10.1)
CHLORIDE SERPL-SCNC: 108 MMOL/L — SIGNIFICANT CHANGE UP (ref 96–108)
CO2 SERPL-SCNC: 25 MMOL/L — SIGNIFICANT CHANGE UP (ref 22–31)
CREAT SERPL-MCNC: 1.1 MG/DL — SIGNIFICANT CHANGE UP (ref 0.5–1.3)
GLUCOSE SERPL-MCNC: 133 MG/DL — HIGH (ref 70–99)
HCT VFR BLD CALC: 34.1 % — LOW (ref 34.5–45)
HGB BLD-MCNC: 11.3 G/DL — LOW (ref 11.5–15.5)
MCHC RBC-ENTMCNC: 30.5 PG — SIGNIFICANT CHANGE UP (ref 27–34)
MCHC RBC-ENTMCNC: 33.1 GM/DL — SIGNIFICANT CHANGE UP (ref 32–36)
MCV RBC AUTO: 92.2 FL — SIGNIFICANT CHANGE UP (ref 80–100)
NRBC # BLD: 0 /100 WBCS — SIGNIFICANT CHANGE UP (ref 0–0)
PLATELET # BLD AUTO: 176 K/UL — SIGNIFICANT CHANGE UP (ref 150–400)
POTASSIUM SERPL-MCNC: 3.5 MMOL/L — SIGNIFICANT CHANGE UP (ref 3.5–5.3)
POTASSIUM SERPL-SCNC: 3.5 MMOL/L — SIGNIFICANT CHANGE UP (ref 3.5–5.3)
RBC # BLD: 3.7 M/UL — LOW (ref 3.8–5.2)
RBC # FLD: 14.6 % — HIGH (ref 10.3–14.5)
SODIUM SERPL-SCNC: 141 MMOL/L — SIGNIFICANT CHANGE UP (ref 135–145)
SURGICAL PATHOLOGY FINAL REPORT - CH: SIGNIFICANT CHANGE UP
WBC # BLD: 8.62 K/UL — SIGNIFICANT CHANGE UP (ref 3.8–10.5)
WBC # FLD AUTO: 8.62 K/UL — SIGNIFICANT CHANGE UP (ref 3.8–10.5)

## 2018-03-01 PROCEDURE — 99233 SBSQ HOSP IP/OBS HIGH 50: CPT

## 2018-03-01 RX ORDER — PANTOPRAZOLE SODIUM 20 MG/1
1 TABLET, DELAYED RELEASE ORAL
Qty: 30 | Refills: 0 | OUTPATIENT
Start: 2018-03-01 | End: 2018-03-30

## 2018-03-01 RX ORDER — ASCORBIC ACID 60 MG
1 TABLET,CHEWABLE ORAL
Qty: 0 | Refills: 0 | COMMUNITY
Start: 2018-03-01

## 2018-03-01 RX ORDER — DOCUSATE SODIUM 100 MG
1 CAPSULE ORAL
Qty: 0 | Refills: 0 | COMMUNITY
Start: 2018-03-01

## 2018-03-01 RX ORDER — DICLOFENAC SODIUM 75 MG/1
1 TABLET, DELAYED RELEASE ORAL
Qty: 0 | Refills: 0 | COMMUNITY

## 2018-03-01 RX ORDER — CELECOXIB 200 MG/1
1 CAPSULE ORAL
Qty: 60 | Refills: 0 | OUTPATIENT
Start: 2018-03-01 | End: 2018-03-30

## 2018-03-01 RX ORDER — OXYCODONE HYDROCHLORIDE 5 MG/1
1 TABLET ORAL
Qty: 30 | Refills: 0 | OUTPATIENT
Start: 2018-03-01 | End: 2018-03-05

## 2018-03-01 RX ORDER — ASPIRIN/CALCIUM CARB/MAGNESIUM 324 MG
1 TABLET ORAL
Qty: 60 | Refills: 0 | OUTPATIENT
Start: 2018-03-01 | End: 2018-03-30

## 2018-03-01 RX ORDER — MAGNESIUM HYDROXIDE 400 MG/1
30 TABLET, CHEWABLE ORAL
Qty: 0 | Refills: 0 | COMMUNITY
Start: 2018-03-01

## 2018-03-01 RX ADMIN — Medication 650 MILLIGRAM(S): at 05:37

## 2018-03-01 RX ADMIN — OXYCODONE HYDROCHLORIDE 10 MILLIGRAM(S): 5 TABLET ORAL at 12:59

## 2018-03-01 RX ADMIN — Medication 1 TABLET(S): at 12:59

## 2018-03-01 RX ADMIN — PANTOPRAZOLE SODIUM 40 MILLIGRAM(S): 20 TABLET, DELAYED RELEASE ORAL at 12:59

## 2018-03-01 RX ADMIN — Medication 1 CAPSULE(S): at 06:40

## 2018-03-01 RX ADMIN — Medication 325 MILLIGRAM(S): at 05:37

## 2018-03-01 RX ADMIN — CELECOXIB 200 MILLIGRAM(S): 200 CAPSULE ORAL at 08:57

## 2018-03-01 RX ADMIN — Medication 500 MILLIGRAM(S): at 05:37

## 2018-03-01 RX ADMIN — POLYETHYLENE GLYCOL 3350 17 GRAM(S): 17 POWDER, FOR SOLUTION ORAL at 12:59

## 2018-03-01 RX ADMIN — Medication 1 MILLIGRAM(S): at 12:59

## 2018-03-01 RX ADMIN — CELECOXIB 200 MILLIGRAM(S): 200 CAPSULE ORAL at 09:30

## 2018-03-01 RX ADMIN — Medication 325 MILLIGRAM(S): at 12:59

## 2018-03-01 RX ADMIN — Medication 30 MILLILITER(S): at 00:14

## 2018-03-01 RX ADMIN — Medication 325 MILLIGRAM(S): at 08:57

## 2018-03-01 RX ADMIN — OXYCODONE HYDROCHLORIDE 10 MILLIGRAM(S): 5 TABLET ORAL at 14:00

## 2018-03-01 RX ADMIN — Medication 650 MILLIGRAM(S): at 12:59

## 2018-03-01 RX ADMIN — Medication 650 MILLIGRAM(S): at 00:11

## 2018-03-01 RX ADMIN — Medication 100 MILLIGRAM(S): at 05:37

## 2018-03-01 NOTE — PROGRESS NOTE ADULT - SUBJECTIVE AND OBJECTIVE BOX
POD#1 S/P Left JOHANNA   62yFemale Patient seen and examined, Pain controlled  Patient Denies SOB, CP, N/V/D       PE: Left Hip/LE: Dressing C/D/I, Sensation/motor intact, DP 2+, FROM ankle/toes         B/L LE: Skin intact. +ROM hip/knee/ankle/toes. Ankle Dorsi/plantarflexion: 5/5. Calf: soft, compressible and nontender. DP/PT 2+ NVI.                             11.3   8.62  )-----------( 176      ( 01 Mar 2018 09:56 )             34.1       03-01    141  |  108  |  13  ----------------------------<  133<H>  3.5   |  25  |  1.10    Ca    8.4<L>      01 Mar 2018 09:56          A: As above   P: Pain Control       DVT Prophylaxis      Incentive spirometry      Total hip precautions Reviewed       PT WBAT LLE      Isometric exercises      Discharge Planning      All the above discussed and understood by pt       Ortho to F/U
Patient is a 62y old  Female who presents with a chief complaint of Left Hip Pain (27 Feb 2018 23:26)      INTERVAL HPI/OVERNIGHT EVENTS:  pain in better control, appetite good, ambulated, +flatus    MEDICATIONS  (STANDING):  acetaminophen   Tablet 650 milliGRAM(s) Oral every 6 hours  ascorbic acid 500 milliGRAM(s) Oral two times a day  aspirin enteric coated 325 milliGRAM(s) Oral two times a day  atorvastatin 20 milliGRAM(s) Oral at bedtime  celecoxib 200 milliGRAM(s) Oral two times a day before meals  docusate sodium 100 milliGRAM(s) Oral three times a day  ferrous    sulfate 325 milliGRAM(s) Oral three times a day with meals  folic acid 1 milliGRAM(s) Oral daily  morphine  - Injectable 4 milliGRAM(s) IV Push once  multivitamin 1 Tablet(s) Oral daily  pantoprazole    Tablet 40 milliGRAM(s) Oral daily  polyethylene glycol 3350 17 Gram(s) Oral daily  triamterene 37.5 mG/hydrochlorothiazide 25 mG Capsule 1 Capsule(s) Oral daily    MEDICATIONS  (PRN):  acetaminophen   Tablet 650 milliGRAM(s) Oral every 6 hours PRN For Temp over 38.3 C (100.94 F)  aluminum hydroxide/magnesium hydroxide/simethicone Suspension 30 milliLiter(s) Oral four times a day PRN Indigestion  bisacodyl Suppository 10 milliGRAM(s) Rectal daily PRN If no bowel movement by POD#2  magnesium hydroxide Suspension 30 milliLiter(s) Oral at bedtime PRN Constipation  ondansetron Injectable 4 milliGRAM(s) IV Push every 6 hours PRN Nausea and/or Vomiting  oxyCODONE    IR 5 milliGRAM(s) Oral every 4 hours PRN Mild Pain  oxyCODONE    IR 10 milliGRAM(s) Oral every 4 hours PRN Moderate Pain  senna 2 Tablet(s) Oral at bedtime PRN Constipation      Allergies    penicillins (Hives)    Intolerances        REVIEW OF SYSTEMS:  CONSTITUTIONAL: No fever, weight loss, + fatigue  EYES: No eye pain, visual disturbances, or discharge  ENMT:  No difficulty hearing, tinnitus, vertigo; No sinus or throat pain  RESPIRATORY: No cough, wheezing, chills or hemoptysis; No shortness of breath  CARDIOVASCULAR: No chest pain, palpitations, dizziness, or leg swelling  GASTROINTESTINAL: No abdominal or epigastric pain. No nausea, vomiting, or hematemesis; No diarrhea or constipation. No melena or hematochezia.  GENITOURINARY: No dysuria, frequency, hematuria, or incontinence  NEUROLOGICAL: No headaches, memory loss, loss of strength, numbness, or tremors  SKIN: No itching, burning, rashes, or lesions   MUSCULOSKELETAL: No joint pain or swelling; No muscle, back, + post-operative left hip pain, but in better control      Vital Signs Last 24 Hrs  T(C): 36.6 (01 Mar 2018 12:41), Max: 37.3 (01 Mar 2018 08:00)  T(F): 97.8 (01 Mar 2018 12:41), Max: 99.2 (01 Mar 2018 08:00)  HR: 81 (01 Mar 2018 12:41) (68 - 86)  BP: 123/72 (01 Mar 2018 12:41) (105/56 - 128/97)  BP(mean): --  RR: 15 (01 Mar 2018 12:41) (15 - 16)  SpO2: 95% (01 Mar 2018 12:41) (95% - 100%)    PHYSICAL EXAM:  GENERAL: NAD, well-groomed, obese  HEAD:  Atraumatic, Normocephalic  EYES: EOMI, PERRLA, conjunctiva and sclera clear  ENMT: No tonsillar erythema, exudates, or enlargement; Moist mucous membranes, Good dentition, No lesions  NECK: Supple, No JVD, Normal thyroid  NERVOUS SYSTEM:  Alert & Oriented X3, Good concentration; Motor Strength 5/5 B/L upper and lower extremities  CHEST/LUNG: Clear to percussion bilaterally; No rales, rhonchi, wheezing, or rubs  HEART: Regular rate and rhythm; No murmurs, rubs, or gallops  ABDOMEN: Soft, Nontender, Nondistended; Bowel sounds present  EXTREMITIES:  2+ Peripheral Pulses, left hip c/d/i, mild edema a      LABS:                        11.3   8.62  )-----------( 176      ( 01 Mar 2018 09:56 )             34.1     03-01    141  |  108  |  13  ----------------------------<  133<H>  3.5   |  25  |  1.10    Ca    8.4<L>      01 Mar 2018 09:56          CAPILLARY BLOOD GLUCOSE          RADIOLOGY & ADDITIONAL TESTS:    Imaging Personally Reviewed:  [ ] YES  [ ] NO    Consultant(s) Notes Reviewed:  [ ] YES  [ ] NO    Care Discussed with Consultants/Other Providers [x ] YES  [ ] NO
Patient seen and examined at bedside in no distress.  No complaints offered.  Does not want to be discharged today.  Denies fever, chills, chest pain, sob.  OOB with PT.    Vital Signs Last 24 Hrs  T(F): 96.2 (02-28-18 @ 12:26), Max: 98.1 (02-28-18 @ 07:43)  HR: 72 (02-28-18 @ 12:26)  BP: 95/63 (02-28-18 @ 12:26)  RR: 16 (02-28-18 @ 12:26)  SpO2: 100% (02-28-18 @ 12:26)    GENERAL: Alert, oriented, NAD  CHEST/LUNG: Clear to auscultation bilaterally, respirations nonlabored  HEART: S1S2, Regular rate and rhythm  ABDOMEN: Soft, NTND  EXTREMITIES: L hip prineo clean and dry. No drainage. No localized erythema. Sensation/motor intact, DP 2+, FROM ankle/toes          B/L LE: Skin intact. +ROM hip/knee/ankle/toes. Ankle Dorsi/plantarflexion: 5/5. Calf: soft, compressible and nontender. DP/PT 2+ NVI.    LABS:                        11.4   11.11 )-----------( 175      ( 28 Feb 2018 06:34 )             33.4     02-28    140  |  107  |  13  ----------------------------<  122<H>  3.4<L>   |  21<L>  |  1.02    Ca    8.2<L>      28 Feb 2018 06:34    Impression: 62F PMH DAMASO, HTN POD#1 s/p L JOHANNA  Plan:  WBAT, PT  DVT ppx  IS  Pain management  Discharge planning tomorrow  Discussed with Dr. Oleary
Post-op Check   POD#0 s/p Left JOHANNA   62yFemale Patient seen and examined, Pain controlled  Patient Denies SOB, CP, N/V/D       PE: Left Hip/LE: Dressing C/D/I, Sensation/motor intact, DP 2+, FROM ankle/toes          B/L LE: Skin intact. +ROM hip/knee/ankle/toes. Ankle Dorsi/plantarflexion: 5/5. Calf: soft, compressible and nontender. DP/PT 2+ NVI.                               11.9   8.88  )-----------( 169      ( 27 Feb 2018 11:08 )             35.9       02-27    143  |  110<H>  |  9   ----------------------------<  107<H>  3.7   |  26  |  1.04    Ca    8.5      27 Feb 2018 11:08          A: As above   P: Pain Control       DVT Prophylaxis      Incentive spirometry      Total hip precautions Reviewed       PT WBAT LLE       Isometric exercises      Discharge Planning      All the above discussed and understood by pt       Ortho to F/U
Patient is a 62y old  Female who presents with a chief complaint of left hip surgery (27 Feb 2018 23:26)      INTERVAL HPI/OVERNIGHT EVENTS:  pain in better control, able to ambulate with PT, no n/v, +flatus, no BM yet    MEDICATIONS  (STANDING):  acetaminophen   Tablet 650 milliGRAM(s) Oral every 6 hours  ascorbic acid 500 milliGRAM(s) Oral two times a day  aspirin enteric coated 325 milliGRAM(s) Oral two times a day  atorvastatin 20 milliGRAM(s) Oral at bedtime  celecoxib 200 milliGRAM(s) Oral two times a day before meals  docusate sodium 100 milliGRAM(s) Oral three times a day  ferrous    sulfate 325 milliGRAM(s) Oral three times a day with meals  folic acid 1 milliGRAM(s) Oral daily  morphine  - Injectable 4 milliGRAM(s) IV Push once  multivitamin 1 Tablet(s) Oral daily  pantoprazole    Tablet 40 milliGRAM(s) Oral daily  polyethylene glycol 3350 17 Gram(s) Oral daily  triamterene 37.5 mG/hydrochlorothiazide 25 mG Capsule 1 Capsule(s) Oral daily    MEDICATIONS  (PRN):  acetaminophen   Tablet 650 milliGRAM(s) Oral every 6 hours PRN For Temp over 38.3 C (100.94 F)  aluminum hydroxide/magnesium hydroxide/simethicone Suspension 30 milliLiter(s) Oral four times a day PRN Indigestion  magnesium hydroxide Suspension 30 milliLiter(s) Oral at bedtime PRN Constipation  ondansetron Injectable 4 milliGRAM(s) IV Push every 6 hours PRN Nausea and/or Vomiting  oxyCODONE    IR 5 milliGRAM(s) Oral every 4 hours PRN Mild Pain  oxyCODONE    IR 10 milliGRAM(s) Oral every 4 hours PRN Moderate Pain  senna 2 Tablet(s) Oral at bedtime PRN Constipation      Allergies    penicillins (Hives)    Intolerances        REVIEW OF SYSTEMS:  CONSTITUTIONAL: No fever, weight loss, + fatigue  EYES: No eye pain, visual disturbances, or discharge  RESPIRATORY: No cough, wheezing, chills or hemoptysis; No shortness of breath  CARDIOVASCULAR: No chest pain, palpitations, dizziness, or leg swelling  GASTROINTESTINAL: No abdominal or epigastric pain. No nausea, vomiting, or hematemesis; No diarrhea or constipation. No melena or hematochezia.  GENITOURINARY: No dysuria, frequency, hematuria, or incontinence  NEUROLOGICAL: No headaches, memory loss, loss of strength, numbness, or tremors  SKIN: No itching, burning, rashes, or lesions   MUSCULOSKELETAL: No joint pain or swelling; No muscle, back, post-op hip pain under control      Vital Signs Last 24 Hrs  T(C): 36.4 (28 Feb 2018 16:29), Max: 36.7 (28 Feb 2018 07:43)  T(F): 97.5 (28 Feb 2018 16:29), Max: 98.1 (28 Feb 2018 07:43)  HR: 65 (28 Feb 2018 16:29) (63 - 89)  BP: 102/61 (28 Feb 2018 16:29) (95/63 - 128/69)  BP(mean): 91 (28 Feb 2018 10:30) (91 - 91)  RR: 16 (28 Feb 2018 16:29) (15 - 17)  SpO2: 100% (28 Feb 2018 16:29) (97% - 100%)    PHYSICAL EXAM:  GENERAL: NAD, well-groomed, obese  HEAD:  Atraumatic, Normocephalic  EYES: EOMI, PERRLA, conjunctiva and sclera clear  ENMT: No tonsillar erythema, exudates, or enlargement; Moist mucous membranes, Good dentition, No lesions  NECK: Supple, No JVD, Normal thyroid  NERVOUS SYSTEM:  Alert & Oriented X3, Good concentration; Motor Strength 5/5 B/L upper and lower extremities; DTRs 2+ intact and symmetric  CHEST/LUNG: Clear to percussion bilaterally; No rales, rhonchi, wheezing, or rubs  HEART: Regular rate and rhythm; No murmurs, rubs, or gallops  ABDOMEN: Soft, Nontender, Nondistended; Bowel sounds present  EXTREMITIES:  2+ Peripheral Pulses, No clubbing, cyanosis, or edema, left hip c/d/i, mild edema  SKIN: No rashes or lesions    LABS:                        11.4   11.11 )-----------( 175      ( 28 Feb 2018 06:34 )             33.4     02-28    140  |  107  |  13  ----------------------------<  122<H>  3.4<L>   |  21<L>  |  1.02    Ca    8.2<L>      28 Feb 2018 06:34          CAPILLARY BLOOD GLUCOSE          RADIOLOGY & ADDITIONAL TESTS:    Imaging Personally Reviewed:  [ ] YES  [ ] NO    Consultant(s) Notes Reviewed:  [ ] YES  [ ] NO    Care Discussed with Consultants/Other Providers [x ] YES  [ ] NO

## 2018-03-01 NOTE — PROGRESS NOTE ADULT - PROBLEM SELECTOR PLAN 6
likely reactive, afebrile, no other signs or symptoms of infection, monitor
likely reactive, afebrile, no other signs or symptoms of infection, monitor

## 2018-03-01 NOTE — PROGRESS NOTE ADULT - ASSESSMENT
61 yo F obese, h/o breast CA s/p lumpectomy, HTN, DAMASO (does not use a CPAP), OA s/p left THR
S/P Left JOHANNA
S/P Left JOHANNA
63 yo F obese, h/o breast CA s/p lumpectomy, HTN, DAMASO (does not use a CPAP), OA s/p left THR

## 2018-03-01 NOTE — CHART NOTE - NSCHARTNOTEFT_GEN_A_CORE
Per evaluation by physical therapy, pt requires a wide rolling walker & wide 3-in-1 commode due to her body habitus.

## 2018-03-05 DIAGNOSIS — I10 ESSENTIAL (PRIMARY) HYPERTENSION: ICD-10-CM

## 2018-03-05 DIAGNOSIS — M16.12 UNILATERAL PRIMARY OSTEOARTHRITIS, LEFT HIP: ICD-10-CM

## 2018-03-05 DIAGNOSIS — E66.01 MORBID (SEVERE) OBESITY DUE TO EXCESS CALORIES: ICD-10-CM

## 2018-03-05 DIAGNOSIS — Z91.19 PATIENT'S NONCOMPLIANCE WITH OTHER MEDICAL TREATMENT AND REGIMEN: ICD-10-CM

## 2018-03-05 DIAGNOSIS — Z88.0 ALLERGY STATUS TO PENICILLIN: ICD-10-CM

## 2018-03-05 DIAGNOSIS — D72.829 ELEVATED WHITE BLOOD CELL COUNT, UNSPECIFIED: ICD-10-CM

## 2018-03-05 DIAGNOSIS — Z85.3 PERSONAL HISTORY OF MALIGNANT NEOPLASM OF BREAST: ICD-10-CM

## 2018-03-05 DIAGNOSIS — G47.33 OBSTRUCTIVE SLEEP APNEA (ADULT) (PEDIATRIC): ICD-10-CM

## 2018-03-05 DIAGNOSIS — E78.5 HYPERLIPIDEMIA, UNSPECIFIED: ICD-10-CM

## 2018-03-05 DIAGNOSIS — E87.6 HYPOKALEMIA: ICD-10-CM

## 2018-08-10 PROBLEM — C50.919 MALIGNANT NEOPLASM OF UNSPECIFIED SITE OF UNSPECIFIED FEMALE BREAST: Chronic | Status: ACTIVE | Noted: 2018-02-13

## 2018-08-20 ENCOUNTER — OUTPATIENT (OUTPATIENT)
Dept: OUTPATIENT SERVICES | Facility: HOSPITAL | Age: 63
LOS: 1 days | Discharge: HOME | End: 2018-08-20
Payer: COMMERCIAL

## 2018-08-20 VITALS
HEART RATE: 72 BPM | DIASTOLIC BLOOD PRESSURE: 77 MMHG | SYSTOLIC BLOOD PRESSURE: 139 MMHG | HEIGHT: 68 IN | RESPIRATION RATE: 16 BRPM | TEMPERATURE: 98 F | WEIGHT: 250.45 LBS | OXYGEN SATURATION: 98 %

## 2018-08-20 DIAGNOSIS — G47.33 OBSTRUCTIVE SLEEP APNEA (ADULT) (PEDIATRIC): ICD-10-CM

## 2018-08-20 DIAGNOSIS — M17.11 UNILATERAL PRIMARY OSTEOARTHRITIS, RIGHT KNEE: ICD-10-CM

## 2018-08-20 DIAGNOSIS — Z01.818 ENCOUNTER FOR OTHER PREPROCEDURAL EXAMINATION: ICD-10-CM

## 2018-08-20 DIAGNOSIS — I10 ESSENTIAL (PRIMARY) HYPERTENSION: ICD-10-CM

## 2018-08-20 DIAGNOSIS — Z96.642 PRESENCE OF LEFT ARTIFICIAL HIP JOINT: Chronic | ICD-10-CM

## 2018-08-20 DIAGNOSIS — Z98.890 OTHER SPECIFIED POSTPROCEDURAL STATES: Chronic | ICD-10-CM

## 2018-08-20 LAB
ANION GAP SERPL CALC-SCNC: 8 MMOL/L — SIGNIFICANT CHANGE UP (ref 5–17)
APTT BLD: 39.4 SEC — HIGH (ref 27.5–37.4)
BASOPHILS # BLD AUTO: 0.01 K/UL — SIGNIFICANT CHANGE UP (ref 0–0.2)
BASOPHILS NFR BLD AUTO: 0.3 % — SIGNIFICANT CHANGE UP (ref 0–2)
BUN SERPL-MCNC: 11 MG/DL — SIGNIFICANT CHANGE UP (ref 7–23)
CALCIUM SERPL-MCNC: 9.1 MG/DL — SIGNIFICANT CHANGE UP (ref 8.5–10.1)
CHLORIDE SERPL-SCNC: 105 MMOL/L — SIGNIFICANT CHANGE UP (ref 96–108)
CO2 SERPL-SCNC: 29 MMOL/L — SIGNIFICANT CHANGE UP (ref 22–31)
CREAT SERPL-MCNC: 0.94 MG/DL — SIGNIFICANT CHANGE UP (ref 0.5–1.3)
EOSINOPHIL # BLD AUTO: 0.04 K/UL — SIGNIFICANT CHANGE UP (ref 0–0.5)
EOSINOPHIL NFR BLD AUTO: 1.1 % — SIGNIFICANT CHANGE UP (ref 0–6)
GLUCOSE SERPL-MCNC: 92 MG/DL — SIGNIFICANT CHANGE UP (ref 70–99)
HBA1C BLD-MCNC: 5.9 % — HIGH (ref 4–5.6)
HCT VFR BLD CALC: 42 % — SIGNIFICANT CHANGE UP (ref 34.5–45)
HGB BLD-MCNC: 13.4 G/DL — SIGNIFICANT CHANGE UP (ref 11.5–15.5)
IMM GRANULOCYTES NFR BLD AUTO: 0 % — SIGNIFICANT CHANGE UP (ref 0–1.5)
INR BLD: 1.08 RATIO — SIGNIFICANT CHANGE UP (ref 0.88–1.16)
LYMPHOCYTES # BLD AUTO: 1.28 K/UL — SIGNIFICANT CHANGE UP (ref 1–3.3)
LYMPHOCYTES # BLD AUTO: 35 % — SIGNIFICANT CHANGE UP (ref 13–44)
MCHC RBC-ENTMCNC: 28 PG — SIGNIFICANT CHANGE UP (ref 27–34)
MCHC RBC-ENTMCNC: 31.9 GM/DL — LOW (ref 32–36)
MCV RBC AUTO: 87.9 FL — SIGNIFICANT CHANGE UP (ref 80–100)
MONOCYTES # BLD AUTO: 0.47 K/UL — SIGNIFICANT CHANGE UP (ref 0–0.9)
MONOCYTES NFR BLD AUTO: 12.8 % — SIGNIFICANT CHANGE UP (ref 2–14)
MRSA PCR RESULT.: SIGNIFICANT CHANGE UP
NEUTROPHILS # BLD AUTO: 1.86 K/UL — SIGNIFICANT CHANGE UP (ref 1.8–7.4)
NEUTROPHILS NFR BLD AUTO: 50.8 % — SIGNIFICANT CHANGE UP (ref 43–77)
NRBC # BLD: 0 /100 WBCS — SIGNIFICANT CHANGE UP (ref 0–0)
PLATELET # BLD AUTO: 202 K/UL — SIGNIFICANT CHANGE UP (ref 150–400)
POTASSIUM SERPL-MCNC: 4.1 MMOL/L — SIGNIFICANT CHANGE UP (ref 3.5–5.3)
POTASSIUM SERPL-SCNC: 4.1 MMOL/L — SIGNIFICANT CHANGE UP (ref 3.5–5.3)
PROTHROM AB SERPL-ACNC: 11.8 SEC — SIGNIFICANT CHANGE UP (ref 9.8–12.7)
RBC # BLD: 4.78 M/UL — SIGNIFICANT CHANGE UP (ref 3.8–5.2)
RBC # FLD: 15.9 % — HIGH (ref 10.3–14.5)
S AUREUS DNA NOSE QL NAA+PROBE: SIGNIFICANT CHANGE UP
SODIUM SERPL-SCNC: 142 MMOL/L — SIGNIFICANT CHANGE UP (ref 135–145)
WBC # BLD: 3.66 K/UL — LOW (ref 3.8–10.5)
WBC # FLD AUTO: 3.66 K/UL — LOW (ref 3.8–10.5)

## 2018-08-20 PROCEDURE — 93010 ELECTROCARDIOGRAM REPORT: CPT | Mod: NC

## 2018-08-20 NOTE — OCCUPATIONAL THERAPY INITIAL EVALUATION ADULT - PATIENT/FAMILY/SIGNIFICANT OTHER GOALS STATEMENT, OT EVAL
Pt would like to be restored to prior level of function and receive rehab services at a facility instead home.

## 2018-08-20 NOTE — PHYSICAL THERAPY INITIAL EVALUATION ADULT - GAIT DEVIATIONS NOTED, PT EVAL
decreased stride length/decreased velocity of limb motion/decreased weight-shifting ability/decreased etelvina/decreased step length

## 2018-08-20 NOTE — OCCUPATIONAL THERAPY INITIAL EVALUATION ADULT - RANGE OF MOTION EXAMINATION, LOWER EXTREMITY
Left LE Active ROM was WFL (within functional limits)/Right LE Active Assistive ROM was WFL  (within functional limits)/Right LE Passive ROM was WFL  (within functional limits)/Left LE Passive ROM was WFL (w/i functional limits)/ROM in right hip, knee  flexion is diminished.

## 2018-08-20 NOTE — H&P PST ADULT - PSH
S/P lumpectomy, left breast  2 years ago H/O total hip arthroplasty, left    S/P lumpectomy, left breast  2 years ago

## 2018-08-20 NOTE — OCCUPATIONAL THERAPY INITIAL EVALUATION ADULT - SOCIAL CONCERNS
Pt voiced concerns about her recovery at home. Pt stated " I no longer has the support of my daughter's  and my house goes to work in the days. I have no on to assist me after discharge home after surgery./Complex psychosocial needs/coping issues Pt voiced concerns about her recovery at home. Pt stated " I no longer have the support of my daughter's  and my  goes to work in the days. I have no one to assist me after discharge home after surgery"./Complex psychosocial needs/coping issues

## 2018-08-20 NOTE — OCCUPATIONAL THERAPY INITIAL EVALUATION ADULT - PRECAUTIONS/LIMITATIONS, REHAB EVAL
Pain in  right  knee limits pt's ability to perform ADL & functional mobility ,/obesity precautions/fall precautions fall precautions/Pain in right  knee limits pt's ability to perform ADL & functional mobility ,/obesity precautions

## 2018-08-20 NOTE — H&P PST ADULT - HISTORY OF PRESENT ILLNESS
63F pm ____ here for PSt for scheduled Right total knee arthroplasty 63F pmh htn, sleep apnea (no longer using CPAP Since weight loss), breast cancer s/p lumpectomy here for PST for scheduled Right total knee arthroplasty

## 2018-08-20 NOTE — OCCUPATIONAL THERAPY INITIAL EVALUATION ADULT - ANTICIPATED DISCHARGE DISPOSITION, OT EVAL
Recommend home with OT referral to enable patient to safely perform ADL management and functional mobility. Pt owns a  3-in-1 commode, rolling walker and SAC. Pt would like to obtain a shower chair.

## 2018-08-20 NOTE — PHYSICAL THERAPY INITIAL EVALUATION ADULT - IMPAIRMENTS FOUND, PT EVAL
ROM/muscle strength/posture/gait, locomotion, and balance/aerobic capacity/endurance/ergonomics and body mechanics

## 2018-08-20 NOTE — H&P PST ADULT - ASSESSMENT
63F pmh htn, sleep apnea (no longer using CPAP Since weight loss), breast cancer s/p lumpectomy here for PST for scheduled Right total knee arthroplasty  CAPRINI SCORE    AGE RELATED RISK FACTORS                                                       MOBILITY RELATED FACTORS  [ ] Age 41-60 years                                            (1 Point)                  [ ] Bed rest                                                        (1 Point)  [ ] Age: 61-74 years                                           (2 Points)                [ ] Plaster cast                                                   (2 Points)  [ ] Age= 75 years                                              (3 Points)                 [ ] Bed bound for more than 72 hours                   (2 Points)    DISEASE RELATED RISK FACTORS                                               GENDER SPECIFIC FACTORS  [ ] Edema in the lower extremities                       (1 Point)                  [ ] Pregnancy                                                     (1 Point)  [ ] Varicose veins                                               (1 Point)                  [ ] Post-partum < 6 weeks                                   (1 Point)             [ ] BMI > 25 Kg/m2                                            (1 Point)                  [ ] Hormonal therapy  or oral contraception            (1 Point)                 [ ] Sepsis (in the previous month)                        (1 Point)                  [ ] History of pregnancy complications  [ ] Pneumonia or serious lung disease                                               [ ] Unexplained or recurrent                       (1 Point)           (in the previous month)                               (1 Point)  [ ] Abnormal pulmonary function test                     (1 Point)                 SURGERY RELATED RISK FACTORS  [ ] Acute myocardial infarction                              (1 Point)                 [ ]  Section                                            (1 Point)  [ ] Congestive heart failure (in the previous month)  (1 Point)                 [ ] Minor surgery                                                 (1 Point)   [ ] Inflammatory bowel disease                             (1 Point)                 [ ] Arthroscopic surgery                                        (2 Points)  [ ] Central venous access                                    (2 Points)                [ ] General surgery lasting more than 45 minutes   (2 Points)       [ ] Stroke (in the previous month)                          (5 Points)               [ ] Elective arthroplasty                                        (5 Points)                                                                                                                                               HEMATOLOGY RELATED FACTORS                                                 TRAUMA RELATED RISK FACTORS  [ ] Prior episodes of VTE                                     (3 Points)                 [ ] Fracture of the hip, pelvis, or leg                       (5 Points)  [ ] Positive family history for VTE                         (3 Points)                 [ ] Acute spinal cord injury (in the previous month)  (5 Points)  [ ] Prothrombin 18507 A                                      (3 Points)                 [ ] Paralysis  (less than 1 month)                          (5 Points)  [ ] Factor V Leiden                                             (3 Points)                 [ ] Multiple Trauma within 1 month                         (5 Points)  [ ] Lupus anticoagulants                                     (3 Points)                                                           [ ] Anticardiolipin antibodies                                (3 Points)                                                       [ ] High homocysteine in the blood                      (3 Points)                                             [ ] Other congenital or acquired thrombophilia       (3 Points)                                                [ ] Heparin induced thrombocytopenia                  (3 Points)                                          Total Score [          ] 63F pmh htn, sleep apnea (no longer using CPAP Since weight loss), breast cancer s/p lumpectomy here for PST for scheduled Right total knee arthroplasty  CAPRINI SCORE    AGE RELATED RISK FACTORS                                                       MOBILITY RELATED FACTORS  [ ] Age 41-60 years                                            (1 Point)                  [ ] Bed rest                                                        (1 Point)  [ x] Age: 61-74 years                                           (2 Points)                [ ] Plaster cast                                                   (2 Points)  [ ] Age= 75 years                                              (3 Points)                 [ ] Bed bound for more than 72 hours                   (2 Points)    DISEASE RELATED RISK FACTORS                                               GENDER SPECIFIC FACTORS  [ ] Edema in the lower extremities                       (1 Point)                  [ ] Pregnancy                                                     (1 Point)  [ ] Varicose veins                                               (1 Point)                  [ ] Post-partum < 6 weeks                                   (1 Point)             [x ] BMI > 25 Kg/m2                                            (1 Point)                  [ ] Hormonal therapy  or oral contraception            (1 Point)                 [ ] Sepsis (in the previous month)                        (1 Point)                  [ ] History of pregnancy complications  [ ] Pneumonia or serious lung disease                                               [ ] Unexplained or recurrent                       (1 Point)           (in the previous month)                               (1 Point)  [ ] Abnormal pulmonary function test                     (1 Point)                 SURGERY RELATED RISK FACTORS  [ ] Acute myocardial infarction                              (1 Point)                 [ ]  Section                                            (1 Point)  [ ] Congestive heart failure (in the previous month)  (1 Point)                 [ ] Minor surgery                                                 (1 Point)   [ ] Inflammatory bowel disease                             (1 Point)                 [ ] Arthroscopic surgery                                        (2 Points)  [ ] Central venous access                                    (2 Points)                [ ] General surgery lasting more than 45 minutes   (2 Points)       [ ] Stroke (in the previous month)                          (5 Points)               [x ] Elective arthroplasty                                        (5 Points)                                                                                                                                               HEMATOLOGY RELATED FACTORS                                                 TRAUMA RELATED RISK FACTORS  [ ] Prior episodes of VTE                                     (3 Points)                 [ ] Fracture of the hip, pelvis, or leg                       (5 Points)  [ ] Positive family history for VTE                         (3 Points)                 [ ] Acute spinal cord injury (in the previous month)  (5 Points)  [ ] Prothrombin 21874 A                                      (3 Points)                 [ ] Paralysis  (less than 1 month)                          (5 Points)  [ ] Factor V Leiden                                             (3 Points)                 [ ] Multiple Trauma within 1 month                         (5 Points)  [ ] Lupus anticoagulants                                     (3 Points)                                                           [ ] Anticardiolipin antibodies                                (3 Points)                                                       [ ] High homocysteine in the blood                      (3 Points)                                             [ ] Other congenital or acquired thrombophilia       (3 Points)                                                [ ] Heparin induced thrombocytopenia                  (3 Points)                                          Total Score [     8     ]

## 2018-08-20 NOTE — OCCUPATIONAL THERAPY INITIAL EVALUATION ADULT - PERTINENT HX OF CURRENT PROBLEM, REHAB EVAL
Pt is slated for elective surgery for right TKR, due to OA, pain and DJD. Pt has left THR on 2/27/18 at this facility and was discharge home with rehab services.

## 2018-08-20 NOTE — PHYSICAL THERAPY INITIAL EVALUATION ADULT - ADDITIONAL COMMENTS
Pt is s/p L JOHANNA on 2/27/18. Pt has 5 steps c castro rails,  far apart and unable to be reached simultaneously, to get into house and one flight of stairs c R rail to negotiate at home. Pt claimed that no one will be available to assist her for post surgery care at home.

## 2018-08-20 NOTE — OCCUPATIONAL THERAPY INITIAL EVALUATION ADULT - ADDITIONAL COMMENTS
Pt is functioning in her roles, self sufficient, driving & ambulating independently in the community without  a single axis cane. Pt c/o 9/10 pain in her right knee ;pt takes Tramadol  PRN for pain relief. Pt is right hand dominant and wears glasses for reading. Pt owns a 3-in-1 commode, rolling walker and SAC. Pt scores 90% of patient specific scale

## 2018-08-20 NOTE — OCCUPATIONAL THERAPY INITIAL EVALUATION ADULT - LIVES WITH, PROFILE
spouse/in a private house with 5 entry steps equipped with bilateral wide hand rails. Once inside, pt has to negotiate a flight of stairs to access the bedroom and bathroom. The bathroom has a tub/shower combination retractable shower head and standard toilet.

## 2018-08-21 LAB — BLD GP AB SCN SERPL QL: SIGNIFICANT CHANGE UP

## 2018-08-21 RX ORDER — SODIUM CHLORIDE 9 MG/ML
3 INJECTION INTRAMUSCULAR; INTRAVENOUS; SUBCUTANEOUS EVERY 8 HOURS
Qty: 0 | Refills: 0 | Status: DISCONTINUED | OUTPATIENT
Start: 2018-08-28 | End: 2018-08-30

## 2018-08-28 ENCOUNTER — INPATIENT (INPATIENT)
Facility: HOSPITAL | Age: 63
LOS: 1 days | Discharge: HOME | End: 2018-08-30
Attending: ORTHOPAEDIC SURGERY | Admitting: ORTHOPAEDIC SURGERY
Payer: COMMERCIAL

## 2018-08-28 VITALS
HEART RATE: 76 BPM | SYSTOLIC BLOOD PRESSURE: 131 MMHG | WEIGHT: 250.45 LBS | RESPIRATION RATE: 17 BRPM | DIASTOLIC BLOOD PRESSURE: 70 MMHG | OXYGEN SATURATION: 99 % | HEIGHT: 68 IN | TEMPERATURE: 98 F

## 2018-08-28 DIAGNOSIS — Z98.890 OTHER SPECIFIED POSTPROCEDURAL STATES: Chronic | ICD-10-CM

## 2018-08-28 DIAGNOSIS — Z96.642 PRESENCE OF LEFT ARTIFICIAL HIP JOINT: Chronic | ICD-10-CM

## 2018-08-28 DIAGNOSIS — M17.10 UNILATERAL PRIMARY OSTEOARTHRITIS, UNSPECIFIED KNEE: ICD-10-CM

## 2018-08-28 DIAGNOSIS — I10 ESSENTIAL (PRIMARY) HYPERTENSION: ICD-10-CM

## 2018-08-28 LAB
HCT VFR BLD CALC: 41.3 % — SIGNIFICANT CHANGE UP (ref 34.5–45)
HGB BLD-MCNC: 13.2 G/DL — SIGNIFICANT CHANGE UP (ref 11.5–15.5)
MCHC RBC-ENTMCNC: 28.1 PG — SIGNIFICANT CHANGE UP (ref 27–34)
MCHC RBC-ENTMCNC: 32 GM/DL — SIGNIFICANT CHANGE UP (ref 32–36)
MCV RBC AUTO: 87.9 FL — SIGNIFICANT CHANGE UP (ref 80–100)
NRBC # BLD: 0 /100 WBCS — SIGNIFICANT CHANGE UP (ref 0–0)
PLATELET # BLD AUTO: 193 K/UL — SIGNIFICANT CHANGE UP (ref 150–400)
RBC # BLD: 4.7 M/UL — SIGNIFICANT CHANGE UP (ref 3.8–5.2)
RBC # FLD: 15.6 % — HIGH (ref 10.3–14.5)
WBC # BLD: 4.65 K/UL — SIGNIFICANT CHANGE UP (ref 3.8–10.5)
WBC # FLD AUTO: 4.65 K/UL — SIGNIFICANT CHANGE UP (ref 3.8–10.5)

## 2018-08-28 PROCEDURE — 99233 SBSQ HOSP IP/OBS HIGH 50: CPT

## 2018-08-28 PROCEDURE — 73560 X-RAY EXAM OF KNEE 1 OR 2: CPT | Mod: 26,RT

## 2018-08-28 RX ORDER — ASPIRIN/CALCIUM CARB/MAGNESIUM 324 MG
325 TABLET ORAL
Qty: 0 | Refills: 0 | Status: DISCONTINUED | OUTPATIENT
Start: 2018-08-29 | End: 2018-08-30

## 2018-08-28 RX ORDER — ACETAMINOPHEN 500 MG
650 TABLET ORAL ONCE
Qty: 0 | Refills: 0 | Status: COMPLETED | OUTPATIENT
Start: 2018-08-28 | End: 2018-08-28

## 2018-08-28 RX ORDER — OXYCODONE HYDROCHLORIDE 5 MG/1
10 TABLET ORAL EVERY 4 HOURS
Qty: 0 | Refills: 0 | Status: DISCONTINUED | OUTPATIENT
Start: 2018-08-28 | End: 2018-08-30

## 2018-08-28 RX ORDER — DEXAMETHASONE 0.5 MG/5ML
10 ELIXIR ORAL ONCE
Qty: 0 | Refills: 0 | Status: COMPLETED | OUTPATIENT
Start: 2018-08-29 | End: 2018-08-29

## 2018-08-28 RX ORDER — POLYETHYLENE GLYCOL 3350 17 G/17G
17 POWDER, FOR SOLUTION ORAL DAILY
Qty: 0 | Refills: 0 | Status: DISCONTINUED | OUTPATIENT
Start: 2018-08-28 | End: 2018-08-30

## 2018-08-28 RX ORDER — FENTANYL CITRATE 50 UG/ML
25 INJECTION INTRAVENOUS
Qty: 0 | Refills: 0 | Status: DISCONTINUED | OUTPATIENT
Start: 2018-08-28 | End: 2018-08-28

## 2018-08-28 RX ORDER — CELECOXIB 200 MG/1
200 CAPSULE ORAL EVERY 12 HOURS
Qty: 0 | Refills: 0 | Status: DISCONTINUED | OUTPATIENT
Start: 2018-08-28 | End: 2018-08-30

## 2018-08-28 RX ORDER — CELECOXIB 200 MG/1
200 CAPSULE ORAL ONCE
Qty: 0 | Refills: 0 | Status: COMPLETED | OUTPATIENT
Start: 2018-08-28 | End: 2018-08-28

## 2018-08-28 RX ORDER — ASCORBIC ACID 60 MG
500 TABLET,CHEWABLE ORAL
Qty: 0 | Refills: 0 | Status: DISCONTINUED | OUTPATIENT
Start: 2018-08-28 | End: 2018-08-30

## 2018-08-28 RX ORDER — ONDANSETRON 8 MG/1
4 TABLET, FILM COATED ORAL EVERY 6 HOURS
Qty: 0 | Refills: 0 | Status: DISCONTINUED | OUTPATIENT
Start: 2018-08-28 | End: 2018-08-30

## 2018-08-28 RX ORDER — SODIUM CHLORIDE 9 MG/ML
1000 INJECTION, SOLUTION INTRAVENOUS
Qty: 0 | Refills: 0 | Status: DISCONTINUED | OUTPATIENT
Start: 2018-08-28 | End: 2018-08-28

## 2018-08-28 RX ORDER — DOCUSATE SODIUM 100 MG
100 CAPSULE ORAL THREE TIMES A DAY
Qty: 0 | Refills: 0 | Status: DISCONTINUED | OUTPATIENT
Start: 2018-08-28 | End: 2018-08-30

## 2018-08-28 RX ORDER — HYDROMORPHONE HYDROCHLORIDE 2 MG/ML
1 INJECTION INTRAMUSCULAR; INTRAVENOUS; SUBCUTANEOUS
Qty: 0 | Refills: 0 | Status: DISCONTINUED | OUTPATIENT
Start: 2018-08-28 | End: 2018-08-28

## 2018-08-28 RX ORDER — ACETAMINOPHEN 500 MG
975 TABLET ORAL EVERY 8 HOURS
Qty: 0 | Refills: 0 | Status: DISCONTINUED | OUTPATIENT
Start: 2018-08-28 | End: 2018-08-30

## 2018-08-28 RX ORDER — TRIAMTERENE/HYDROCHLOROTHIAZID 75 MG-50MG
1 TABLET ORAL DAILY
Qty: 0 | Refills: 0 | Status: DISCONTINUED | OUTPATIENT
Start: 2018-08-28 | End: 2018-08-28

## 2018-08-28 RX ORDER — FOLIC ACID 0.8 MG
1 TABLET ORAL DAILY
Qty: 0 | Refills: 0 | Status: DISCONTINUED | OUTPATIENT
Start: 2018-08-28 | End: 2018-08-30

## 2018-08-28 RX ORDER — MAGNESIUM HYDROXIDE 400 MG/1
30 TABLET, CHEWABLE ORAL DAILY
Qty: 0 | Refills: 0 | Status: DISCONTINUED | OUTPATIENT
Start: 2018-08-28 | End: 2018-08-30

## 2018-08-28 RX ORDER — OXYCODONE HYDROCHLORIDE 5 MG/1
20 TABLET ORAL ONCE
Qty: 0 | Refills: 0 | Status: DISCONTINUED | OUTPATIENT
Start: 2018-08-28 | End: 2018-08-28

## 2018-08-28 RX ORDER — HYDROMORPHONE HYDROCHLORIDE 2 MG/ML
1 INJECTION INTRAMUSCULAR; INTRAVENOUS; SUBCUTANEOUS
Qty: 0 | Refills: 0 | Status: DISCONTINUED | OUTPATIENT
Start: 2018-08-28 | End: 2018-08-30

## 2018-08-28 RX ORDER — OXYCODONE HYDROCHLORIDE 5 MG/1
5 TABLET ORAL EVERY 4 HOURS
Qty: 0 | Refills: 0 | Status: DISCONTINUED | OUTPATIENT
Start: 2018-08-28 | End: 2018-08-30

## 2018-08-28 RX ORDER — ATORVASTATIN CALCIUM 80 MG/1
20 TABLET, FILM COATED ORAL AT BEDTIME
Qty: 0 | Refills: 0 | Status: DISCONTINUED | OUTPATIENT
Start: 2018-08-28 | End: 2018-08-28

## 2018-08-28 RX ORDER — SODIUM CHLORIDE 9 MG/ML
1000 INJECTION INTRAMUSCULAR; INTRAVENOUS; SUBCUTANEOUS
Qty: 0 | Refills: 0 | Status: DISCONTINUED | OUTPATIENT
Start: 2018-08-28 | End: 2018-08-30

## 2018-08-28 RX ORDER — FERROUS SULFATE 325(65) MG
325 TABLET ORAL
Qty: 0 | Refills: 0 | Status: DISCONTINUED | OUTPATIENT
Start: 2018-08-28 | End: 2018-08-30

## 2018-08-28 RX ORDER — SENNA PLUS 8.6 MG/1
2 TABLET ORAL AT BEDTIME
Qty: 0 | Refills: 0 | Status: DISCONTINUED | OUTPATIENT
Start: 2018-08-28 | End: 2018-08-30

## 2018-08-28 RX ORDER — PANTOPRAZOLE SODIUM 20 MG/1
40 TABLET, DELAYED RELEASE ORAL DAILY
Qty: 0 | Refills: 0 | Status: DISCONTINUED | OUTPATIENT
Start: 2018-08-28 | End: 2018-08-30

## 2018-08-28 RX ORDER — CEFAZOLIN SODIUM 1 G
2000 VIAL (EA) INJECTION EVERY 8 HOURS
Qty: 0 | Refills: 0 | Status: COMPLETED | OUTPATIENT
Start: 2018-08-28 | End: 2018-08-29

## 2018-08-28 RX ADMIN — Medication 100 MILLIGRAM(S): at 18:47

## 2018-08-28 RX ADMIN — Medication 500 MILLIGRAM(S): at 18:45

## 2018-08-28 RX ADMIN — SODIUM CHLORIDE 100 MILLILITER(S): 9 INJECTION INTRAMUSCULAR; INTRAVENOUS; SUBCUTANEOUS at 15:17

## 2018-08-28 RX ADMIN — OXYCODONE HYDROCHLORIDE 10 MILLIGRAM(S): 5 TABLET ORAL at 18:58

## 2018-08-28 RX ADMIN — CELECOXIB 200 MILLIGRAM(S): 200 CAPSULE ORAL at 18:45

## 2018-08-28 RX ADMIN — CELECOXIB 200 MILLIGRAM(S): 200 CAPSULE ORAL at 19:38

## 2018-08-28 RX ADMIN — Medication 650 MILLIGRAM(S): at 09:23

## 2018-08-28 RX ADMIN — CELECOXIB 200 MILLIGRAM(S): 200 CAPSULE ORAL at 09:23

## 2018-08-28 RX ADMIN — OXYCODONE HYDROCHLORIDE 10 MILLIGRAM(S): 5 TABLET ORAL at 16:26

## 2018-08-28 RX ADMIN — Medication 975 MILLIGRAM(S): at 23:11

## 2018-08-28 RX ADMIN — OXYCODONE HYDROCHLORIDE 10 MILLIGRAM(S): 5 TABLET ORAL at 19:38

## 2018-08-28 RX ADMIN — SODIUM CHLORIDE 100 MILLILITER(S): 9 INJECTION, SOLUTION INTRAVENOUS at 14:18

## 2018-08-28 RX ADMIN — Medication 325 MILLIGRAM(S): at 18:45

## 2018-08-28 RX ADMIN — OXYCODONE HYDROCHLORIDE 10 MILLIGRAM(S): 5 TABLET ORAL at 15:53

## 2018-08-28 RX ADMIN — OXYCODONE HYDROCHLORIDE 10 MILLIGRAM(S): 5 TABLET ORAL at 23:13

## 2018-08-28 RX ADMIN — Medication 100 MILLIGRAM(S): at 23:14

## 2018-08-28 RX ADMIN — OXYCODONE HYDROCHLORIDE 20 MILLIGRAM(S): 5 TABLET ORAL at 09:24

## 2018-08-28 NOTE — PHYSICAL THERAPY INITIAL EVALUATION ADULT - ACTIVE RANGE OF MOTION EXAMINATION, REHAB EVAL
R knee AAROM 85 degrees of flexion, AAROM -5 degrees of extension to R knee. All other extremities WFL

## 2018-08-28 NOTE — PROGRESS NOTE ADULT - SUBJECTIVE AND OBJECTIVE BOX
Post-op Check   POD#0 s/p Right TKA  63yFemale Patient seen and examined, Pain controlled  Patient Denies SOB, CP, N/V/D       PE: Right Knee/LE: Dressing C/D/I, Sensation/motor intact, DP 2+, FROM ankle/toes   B/L LE: Skin intact. +ROM hip/knee/ankle/toes. Ankle Dorsi/plantarflexion: 5/5. Calf: soft, compressible and nontender. DP/PT 2+ NVI.                             13.2   4.65  )-----------( 193      ( 28 Aug 2018 13:54 )             41.3         A: As above   P: Pain Control       DVT Prophylaxis      Incentive spirometry      PT WBAT RLE      Isometric exercises      Discharge Planning      All the above discussed and understood by pt       Ortho to F/U

## 2018-08-28 NOTE — DISCHARGE NOTE ADULT - INSTRUCTIONS
Resume Home Diet Resume Home Diet  Increase fluid intake over the next few days to replace fluid losses and prevent constipation

## 2018-08-28 NOTE — PHYSICAL THERAPY INITIAL EVALUATION ADULT - ADDITIONAL COMMENTS
. Pt has 5 steps c castro rails,  far apart and unable to be reached simultaneously, to get into house and one flight of stairs c R rail to negotiate at home. Pt claimed that no one will be available to assist her for post surgery care at home.

## 2018-08-28 NOTE — DISCHARGE NOTE ADULT - HOSPITAL COURSE
63yFemale with history of Right Knee Pain presenting for Right TKA by Dr. Oleary on 8/28/18. Risk and benefits of surgery were explained to the patient. The patient understood and agreed to proceed with surgery. Patient underwent the procedure with no intraoperative complications. Pt was brought in stable condition to the PACU. Once stable in PACU, pt was brought to the floor. During hospital stay pt was followed by Medicine, Pt had an uneventful hospital course. Pt is stable for discharge to 63yFemale with history of Right Knee Pain presenting for Right TKA by Dr. Oleary on 8/28/18. Risk and benefits of surgery were explained to the patient. The patient understood and agreed to proceed with surgery. Patient underwent the procedure with no intraoperative complications. Pt was brought in stable condition to the PACU. Once stable in PACU, pt was brought to the floor. During hospital stay pt was followed by Medicine, OT, PT and home care.  Pt had an uneventful hospital course. Pt is stable for discharge home on POD#2

## 2018-08-28 NOTE — DISCHARGE NOTE ADULT - MEDICATION SUMMARY - MEDICATIONS TO TAKE
I will START or STAY ON the medications listed below when I get home from the hospital:    acetaminophen 325 mg oral tablet  -- 3 tab(s) by mouth every 8 hours  -- Indication: For for pain    aspirin 325 mg oral delayed release tablet  -- 1 tab(s) by mouth 2 times a day MDD:2  -- Indication: For Prevent blood clots     bisacodyl 10 mg rectal suppository  -- 1 suppository(ies) rectally once a day, As needed, If no bowel movement by postoperative day #2  -- Indication: For treat constipation    bisacodyl 5 mg oral delayed release tablet  -- 1 tab(s) by mouth every 12 hours, As needed, Constipation  -- Indication: For treat constipation    docusate sodium 100 mg oral capsule  -- 1 cap(s) by mouth 3 times a day  -- Indication: For Prevent constipation    pantoprazole 40 mg oral delayed release tablet  -- 1 tab(s) by mouth once a day MDD:1  -- Indication: For Protect stomach    Multiple Vitamins oral tablet  -- 1 tab(s) by mouth once a day  -- Indication: For wound healing    ascorbic acid 500 mg oral tablet  -- 1 tab(s) by mouth 2 times a day  -- Indication: For wound healing I will START or STAY ON the medications listed below when I get home from the hospital:    acetaminophen 325 mg oral tablet  -- 3 tab(s) by mouth every 8 hours  -- Indication: For for pain    oxyCODONE 5 mg oral tablet  -- 1- 2 tab(s) by mouth every 4 hours, As Needed -pain one tab every 4 hrs for mil-mod pain, 2 tabs for severe pain MDD:12  -- Indication: For for pain    aspirin 325 mg oral delayed release tablet  -- 1 tab(s) by mouth 2 times a day MDD:2  -- Indication: For Prevent blood clots     bisacodyl 10 mg rectal suppository  -- 1 suppository(ies) rectally once a day, As needed, If no bowel movement by postoperative day #2  -- Indication: For treat constipation    bisacodyl 5 mg oral delayed release tablet  -- 1 tab(s) by mouth every 12 hours, As needed, Constipation  -- Indication: For treat constipation    docusate sodium 100 mg oral capsule  -- 1 cap(s) by mouth 3 times a day  -- Indication: For Prevent constipation    pantoprazole 40 mg oral delayed release tablet  -- 1 tab(s) by mouth once a day MDD:1  -- Indication: For Protect stomach    Multiple Vitamins oral tablet  -- 1 tab(s) by mouth once a day  -- Indication: For wound healing    ascorbic acid 500 mg oral tablet  -- 1 tab(s) by mouth 2 times a day  -- Indication: For wound healing

## 2018-08-28 NOTE — BRIEF OPERATIVE NOTE - PROCEDURE
<<-----Click on this checkbox to enter Procedure Total knee arthroplasty  08/28/2018  Right TKA  Active  [id number]

## 2018-08-28 NOTE — DISCHARGE NOTE ADULT - MEDICATION SUMMARY - MEDICATIONS TO STOP TAKING
I will STOP taking the medications listed below when I get home from the hospital:    traMADol 50 mg oral tablet  -- 1 tab(s) by mouth 2 times a day, As Needed

## 2018-08-28 NOTE — CONSULT NOTE ADULT - SUBJECTIVE AND OBJECTIVE BOX
Patient is a 63y old  Female who presents with a chief complaint of Right knee pain (28 Aug 2018 08:42)      INTERVAL HPI/ OVERNIGHT EVENTS: Pt was seen and examined at bedside today, POD 0 right TKA, pt has minimal post surgical right knee pain otherwise no significant complaints.     MEDICATIONS  (STANDING):  acetaminophen   Tablet. 975 milliGRAM(s) Oral every 8 hours  ascorbic acid 500 milliGRAM(s) Oral two times a day  ceFAZolin   IVPB 2000 milliGRAM(s) IV Intermittent every 8 hours  celecoxib 200 milliGRAM(s) Oral every 12 hours  docusate sodium 100 milliGRAM(s) Oral three times a day  ferrous    sulfate 325 milliGRAM(s) Oral three times a day with meals  folic acid 1 milliGRAM(s) Oral daily  multivitamin 1 Tablet(s) Oral daily  pantoprazole    Tablet 40 milliGRAM(s) Oral daily  polyethylene glycol 3350 17 Gram(s) Oral daily  sodium chloride 0.9% lock flush 3 milliLiter(s) IV Push every 8 hours  sodium chloride 0.9%. 1000 milliLiter(s) (100 mL/Hr) IV Continuous <Continuous>    MEDICATIONS  (PRN):  aluminum hydroxide/magnesium hydroxide/simethicone Suspension 30 milliLiter(s) Oral four times a day PRN Indigestion  HYDROmorphone  Injectable 1 milliGRAM(s) IV Push every 3 hours PRN breakthrough pain  magnesium hydroxide Suspension 30 milliLiter(s) Oral daily PRN Constipation  ondansetron Injectable 4 milliGRAM(s) IV Push every 6 hours PRN Nausea and/or Vomiting  oxyCODONE    IR 5 milliGRAM(s) Oral every 4 hours PRN pain 1 - 5  oxyCODONE    IR 10 milliGRAM(s) Oral every 4 hours PRN Pain 6 - 10  senna 2 Tablet(s) Oral at bedtime PRN Constipation      Allergies    penicillins (Hives)    Intolerances        REVIEW OF SYSTEMS:    Unable to examine due to [ ] Encephalopathy [ ] Advanced Dementia [ ] Expressive Aphasia [ ] Non-verbal patient    CONSTITUTIONAL: No fever, NO generalized weakness/Fatigue, No weight loss  EYES: No eye pain, visual disturbances, or discharge  ENMT:  No difficulty hearing, tinnitus, vertigo; No sinus or throat pain  NECK: No pain or stiffness  RESPIRATORY: No shortness of breath,  cough, wheezing, sputum or hemoptysis   CARDIOVASCULAR: No chest pain, palpitations, or leg swelling  GASTROINTESTINAL: No abdominal pain. No nausea, vomiting, diarrhea or constipation. No melena or hematochezia.  GENITOURINARY: No dysuria, frequency, hematuria, or incontinence  NEUROLOGICAL: No headaches, Dizziness, memory loss, loss of strength, numbness, or tremors  SKIN: No itching, burning, rashes, or lesions   MUSCULOSKELETAL: right knee pain   PSYCHIATRIC: No depression, anxiety, mood swings, or difficulty sleeping  HEME/LYMPH: No easy bruising, or bleeding gums      Vital Signs Last 24 Hrs  T(C): 36.2 (28 Aug 2018 16:47), Max: 36.7 (28 Aug 2018 08:55)  T(F): 97.2 (28 Aug 2018 16:47), Max: 98 (28 Aug 2018 08:55)  HR: 61 (28 Aug 2018 16:47) (59 - 76)  BP: 102/56 (28 Aug 2018 16:47) (102/56 - 131/76)  BP(mean): --  RR: 16 (28 Aug 2018 16:47) (9 - 17)  SpO2: 100% (28 Aug 2018 16:47) (99% - 100%)    PHYSICAL EXAM:  GENERAL: NAD, well-developed, well-groomed  HEAD:  Atraumatic, Normocephalic  EYES: conjunctiva and sclera clear  ENMT: Moist mucous membranes  NECK: Supple, No JVD, Normal thyroid  CHEST/LUNG: Clear to Auscultation bilaterally; No rales, rhonchi, wheezing, or rubs  HEART: Regular rate and rhythm; No murmurs, rubs, or gallops  ABDOMEN: Soft, Nontender, Nondistended; Bowel sounds present  EXTREMITIES:  right leg in surgical brace  SKIN: No rashes or lesions  NERVOUS SYSTEM:  Alert & Oriented X3, Good concentration; Motor Strength 5/5 B/L upper and lower extremities    LABS:                        13.2   4.65  )-----------( 193      ( 28 Aug 2018 13:54 )             41.3               CAPILLARY BLOOD GLUCOSE              RADIOLOGY & ADDITIONAL TESTS:          Imaging Personally Reviewed:  [ ] YES  [ ] NO    Consultant(s) Notes Reviewed:  [ ] YES  [ ] NO    Care Discussed with Consultants/Other Providers [x ] YES  [ ] NO

## 2018-08-28 NOTE — PHYSICAL THERAPY INITIAL EVALUATION ADULT - ASR EQUIP NEEDS DISCH PT EVAL
Recommending 3:1 commode. Pt requesting shower chair.. Pt has a rolling walker and standard cane at home.

## 2018-08-28 NOTE — DISCHARGE NOTE ADULT - NS AS ACTIVITY OBS
Stairs allowed/Walking-Outdoors allowed/No Heavy lifting/straining/Do not drive or operate machinery/Walking-Indoors allowed/Showering allowed Walking-Indoors allowed/Walking-Outdoors allowed/Stairs allowed/No Heavy lifting/straining/Do not make important decisions/Do not drive or operate machinery/Showering allowed

## 2018-08-28 NOTE — DISCHARGE NOTE ADULT - CARE PLAN
Principal Discharge DX:	Primary osteoarthritis of right knee  Goal:	Improve Function, Decrease Pain  Assessment and plan of treatment:	Keep Prineo Dressing Clean, Dry and Intact. May shower with Prineo Dressing. Please do not scrub, soak, peel or pick at the prineo dressing. No creams, lotions, or oils over dressing. May shower and let water run over incision, no baths. Pat dry once out of shower. Dressing to be removed in office at follow up visit in 2 weeks. Principal Discharge DX:	Primary osteoarthritis of right knee  Goal:	Improve Function, Decrease Pain  Assessment and plan of treatment:	Keep Prineo Dressing Clean, Dry and Intact. May shower with Prineo Dressing. Please do not scrub, soak, peel or pick at the prineo dressing. No creams, lotions, or oils over dressing. May shower and let water run over incision, no baths. Pat dry once out of shower. Dressing to be removed in office at follow up visit in 2 weeks.  Leg elevation as much as possible to prevent swelling, Keep knee straight while at rest  Incentive spirometer  Cryocuff

## 2018-08-28 NOTE — PHYSICAL THERAPY INITIAL EVALUATION ADULT - CRITERIA FOR SKILLED THERAPEUTIC INTERVENTIONS
anticipated discharge recommendation/impairments found/Home with home PT. Recommending 3:1 commode. Pt requesting shower chair./predicted duration of therapy intervention/functional limitations in following categories/risk reduction/prevention/therapy frequency/anticipated equipment needs at discharge

## 2018-08-28 NOTE — DISCHARGE NOTE ADULT - PATIENT PORTAL LINK FT
You can access the [facility] Patient Portal, offered by [facility], by registering with the following website: [email or web address]

## 2018-08-28 NOTE — PHYSICAL THERAPY INITIAL EVALUATION ADULT - GAIT DEVIATIONS NOTED, PT EVAL
increased time in double stance/decreased velocity of limb motion/decreased etelvina/decreased weight-shifting ability/decreased step length/decreased stride length

## 2018-08-28 NOTE — CONSULT NOTE ADULT - PROBLEM SELECTOR RECOMMENDATION 9
POD 0 right TKA, Rehab as per PT, analgesic prn, incentive spirometry, IV abx, will f/u am labs. No obvious medical contraindications at this time. will follow while pt is admitted.

## 2018-08-28 NOTE — DISCHARGE NOTE ADULT - PLAN OF CARE
Keep Prineo Dressing Clean, Dry and Intact. May shower with Prineo Dressing. Please do not scrub, soak, peel or pick at the prineo dressing. No creams, lotions, or oils over dressing. May shower and let water run over incision, no baths. Pat dry once out of shower. Dressing to be removed in office at follow up visit in 2 weeks. Improve Function, Decrease Pain Keep Prineo Dressing Clean, Dry and Intact. May shower with Prineo Dressing. Please do not scrub, soak, peel or pick at the prineo dressing. No creams, lotions, or oils over dressing. May shower and let water run over incision, no baths. Pat dry once out of shower. Dressing to be removed in office at follow up visit in 2 weeks.  Leg elevation as much as possible to prevent swelling, Keep knee straight while at rest  Incentive spirometer  Cryocuff

## 2018-08-28 NOTE — DISCHARGE NOTE ADULT - NSTOBACCOHOTLINE_GEN_A_CS
[facility] Smokers Quitline ([id number]) Health system Smokers Quitline ([id number])

## 2018-08-28 NOTE — DISCHARGE NOTE ADULT - CARE PROVIDER_API CALL
Sean Oleary), Orthopaedic Surgery  [address] [place] [address]  Phone: [phone]  Fax: [phone]

## 2018-08-29 LAB
ANION GAP SERPL CALC-SCNC: 12 MMOL/L — SIGNIFICANT CHANGE UP (ref 5–17)
BUN SERPL-MCNC: 21 MG/DL — SIGNIFICANT CHANGE UP (ref 7–23)
CALCIUM SERPL-MCNC: 8.7 MG/DL — SIGNIFICANT CHANGE UP (ref 8.5–10.1)
CHLORIDE SERPL-SCNC: 103 MMOL/L — SIGNIFICANT CHANGE UP (ref 96–108)
CO2 SERPL-SCNC: 25 MMOL/L — SIGNIFICANT CHANGE UP (ref 22–31)
CREAT SERPL-MCNC: 1.18 MG/DL — SIGNIFICANT CHANGE UP (ref 0.5–1.3)
GLUCOSE SERPL-MCNC: 116 MG/DL — HIGH (ref 70–99)
HCT VFR BLD CALC: 37.6 % — SIGNIFICANT CHANGE UP (ref 34.5–45)
HGB BLD-MCNC: 12.2 G/DL — SIGNIFICANT CHANGE UP (ref 11.5–15.5)
MCHC RBC-ENTMCNC: 28.7 PG — SIGNIFICANT CHANGE UP (ref 27–34)
MCHC RBC-ENTMCNC: 32.4 GM/DL — SIGNIFICANT CHANGE UP (ref 32–36)
MCV RBC AUTO: 88.5 FL — SIGNIFICANT CHANGE UP (ref 80–100)
NRBC # BLD: 0 /100 WBCS — SIGNIFICANT CHANGE UP (ref 0–0)
PLATELET # BLD AUTO: 209 K/UL — SIGNIFICANT CHANGE UP (ref 150–400)
POTASSIUM SERPL-MCNC: 4.6 MMOL/L — SIGNIFICANT CHANGE UP (ref 3.5–5.3)
POTASSIUM SERPL-SCNC: 4.6 MMOL/L — SIGNIFICANT CHANGE UP (ref 3.5–5.3)
RBC # BLD: 4.25 M/UL — SIGNIFICANT CHANGE UP (ref 3.8–5.2)
RBC # FLD: 15.6 % — HIGH (ref 10.3–14.5)
SODIUM SERPL-SCNC: 140 MMOL/L — SIGNIFICANT CHANGE UP (ref 135–145)
WBC # BLD: 10.45 K/UL — SIGNIFICANT CHANGE UP (ref 3.8–10.5)
WBC # FLD AUTO: 10.45 K/UL — SIGNIFICANT CHANGE UP (ref 3.8–10.5)

## 2018-08-29 PROCEDURE — 99233 SBSQ HOSP IP/OBS HIGH 50: CPT

## 2018-08-29 RX ADMIN — Medication 100 MILLIGRAM(S): at 06:23

## 2018-08-29 RX ADMIN — Medication 500 MILLIGRAM(S): at 17:58

## 2018-08-29 RX ADMIN — Medication 500 MILLIGRAM(S): at 06:31

## 2018-08-29 RX ADMIN — OXYCODONE HYDROCHLORIDE 10 MILLIGRAM(S): 5 TABLET ORAL at 00:13

## 2018-08-29 RX ADMIN — Medication 102 MILLIGRAM(S): at 06:31

## 2018-08-29 RX ADMIN — Medication 325 MILLIGRAM(S): at 17:58

## 2018-08-29 RX ADMIN — Medication 975 MILLIGRAM(S): at 00:11

## 2018-08-29 RX ADMIN — OXYCODONE HYDROCHLORIDE 5 MILLIGRAM(S): 5 TABLET ORAL at 07:52

## 2018-08-29 RX ADMIN — CELECOXIB 200 MILLIGRAM(S): 200 CAPSULE ORAL at 17:58

## 2018-08-29 RX ADMIN — OXYCODONE HYDROCHLORIDE 5 MILLIGRAM(S): 5 TABLET ORAL at 08:52

## 2018-08-29 RX ADMIN — SODIUM CHLORIDE 3 MILLILITER(S): 9 INJECTION INTRAMUSCULAR; INTRAVENOUS; SUBCUTANEOUS at 15:58

## 2018-08-29 RX ADMIN — Medication 975 MILLIGRAM(S): at 15:10

## 2018-08-29 RX ADMIN — OXYCODONE HYDROCHLORIDE 10 MILLIGRAM(S): 5 TABLET ORAL at 16:01

## 2018-08-29 RX ADMIN — Medication 325 MILLIGRAM(S): at 07:51

## 2018-08-29 RX ADMIN — OXYCODONE HYDROCHLORIDE 10 MILLIGRAM(S): 5 TABLET ORAL at 11:18

## 2018-08-29 RX ADMIN — OXYCODONE HYDROCHLORIDE 5 MILLIGRAM(S): 5 TABLET ORAL at 12:59

## 2018-08-29 RX ADMIN — Medication 325 MILLIGRAM(S): at 13:00

## 2018-08-29 RX ADMIN — CELECOXIB 200 MILLIGRAM(S): 200 CAPSULE ORAL at 06:51

## 2018-08-29 RX ADMIN — OXYCODONE HYDROCHLORIDE 10 MILLIGRAM(S): 5 TABLET ORAL at 22:12

## 2018-08-29 RX ADMIN — Medication 975 MILLIGRAM(S): at 06:27

## 2018-08-29 RX ADMIN — Medication 100 MILLIGRAM(S): at 15:10

## 2018-08-29 RX ADMIN — Medication 1 TABLET(S): at 12:58

## 2018-08-29 RX ADMIN — Medication 975 MILLIGRAM(S): at 22:12

## 2018-08-29 RX ADMIN — OXYCODONE HYDROCHLORIDE 5 MILLIGRAM(S): 5 TABLET ORAL at 13:59

## 2018-08-29 RX ADMIN — Medication 975 MILLIGRAM(S): at 16:10

## 2018-08-29 RX ADMIN — Medication 975 MILLIGRAM(S): at 07:38

## 2018-08-29 RX ADMIN — Medication 325 MILLIGRAM(S): at 06:23

## 2018-08-29 RX ADMIN — POLYETHYLENE GLYCOL 3350 17 GRAM(S): 17 POWDER, FOR SOLUTION ORAL at 12:59

## 2018-08-29 RX ADMIN — OXYCODONE HYDROCHLORIDE 10 MILLIGRAM(S): 5 TABLET ORAL at 21:11

## 2018-08-29 RX ADMIN — OXYCODONE HYDROCHLORIDE 5 MILLIGRAM(S): 5 TABLET ORAL at 21:12

## 2018-08-29 RX ADMIN — OXYCODONE HYDROCHLORIDE 10 MILLIGRAM(S): 5 TABLET ORAL at 17:01

## 2018-08-29 RX ADMIN — Medication 1 MILLIGRAM(S): at 12:58

## 2018-08-29 RX ADMIN — Medication 975 MILLIGRAM(S): at 21:12

## 2018-08-29 RX ADMIN — Medication 100 MILLIGRAM(S): at 22:34

## 2018-08-29 RX ADMIN — OXYCODONE HYDROCHLORIDE 10 MILLIGRAM(S): 5 TABLET ORAL at 10:18

## 2018-08-29 RX ADMIN — PANTOPRAZOLE SODIUM 40 MILLIGRAM(S): 20 TABLET, DELAYED RELEASE ORAL at 12:58

## 2018-08-29 RX ADMIN — OXYCODONE HYDROCHLORIDE 5 MILLIGRAM(S): 5 TABLET ORAL at 22:12

## 2018-08-29 RX ADMIN — CELECOXIB 200 MILLIGRAM(S): 200 CAPSULE ORAL at 06:31

## 2018-08-29 RX ADMIN — Medication 100 MILLIGRAM(S): at 02:30

## 2018-08-29 NOTE — CONSULT NOTE ADULT - PROBLEM SELECTOR RECOMMENDATION 9
POD 1 right TKA, analgesic prn, incentive spirometry, PT, No obvious medical contraindications at this time. will follow while pt is admitted.

## 2018-08-29 NOTE — OCCUPATIONAL THERAPY INITIAL EVALUATION ADULT - TRANSFER SAFETY CONCERNS NOTED: BED/CHAIR, REHAB EVAL
decreased balance during turns/decreased step length/decreased weight-shifting ability/decreased sequencing ability/inability to maintain weight-bearing restrictions w/o assist

## 2018-08-29 NOTE — CONSULT NOTE ADULT - SUBJECTIVE AND OBJECTIVE BOX
Patient is a 63y old  Female who presents with a chief complaint of Right knee pain (28 Aug 2018 17:59)      INTERVAL HPI/ OVERNIGHT EVENTS: Pt was seen and examined at bedside today, No significant overnight events, other than mild right knee post surgical discomfort pt denies any complaints.      MEDICATIONS  (STANDING):  acetaminophen   Tablet. 975 milliGRAM(s) Oral every 8 hours  ascorbic acid 500 milliGRAM(s) Oral two times a day  aspirin enteric coated 325 milliGRAM(s) Oral two times a day  celecoxib 200 milliGRAM(s) Oral every 12 hours  docusate sodium 100 milliGRAM(s) Oral three times a day  ferrous    sulfate 325 milliGRAM(s) Oral three times a day with meals  folic acid 1 milliGRAM(s) Oral daily  multivitamin 1 Tablet(s) Oral daily  pantoprazole    Tablet 40 milliGRAM(s) Oral daily  polyethylene glycol 3350 17 Gram(s) Oral daily  sodium chloride 0.9% lock flush 3 milliLiter(s) IV Push every 8 hours  sodium chloride 0.9%. 1000 milliLiter(s) (100 mL/Hr) IV Continuous <Continuous>    MEDICATIONS  (PRN):  aluminum hydroxide/magnesium hydroxide/simethicone Suspension 30 milliLiter(s) Oral four times a day PRN Indigestion  HYDROmorphone  Injectable 1 milliGRAM(s) IV Push every 3 hours PRN breakthrough pain  magnesium hydroxide Suspension 30 milliLiter(s) Oral daily PRN Constipation  ondansetron Injectable 4 milliGRAM(s) IV Push every 6 hours PRN Nausea and/or Vomiting  oxyCODONE    IR 5 milliGRAM(s) Oral every 4 hours PRN pain 1 - 5  oxyCODONE    IR 10 milliGRAM(s) Oral every 4 hours PRN Pain 6 - 10  senna 2 Tablet(s) Oral at bedtime PRN Constipation      Allergies    penicillins (Hives)    Intolerances        REVIEW OF SYSTEMS:    Unable to examine due to [ ] Encephalopathy [ ] Advanced Dementia [ ] Expressive Aphasia [ ] Non-verbal patient    CONSTITUTIONAL: No fever, NO generalized weakness/Fatigue, No weight loss  EYES: No eye pain, visual disturbances, or discharge  ENMT:  No difficulty hearing, tinnitus, vertigo; No sinus or throat pain  NECK: No pain or stiffness  RESPIRATORY: No shortness of breath,  cough, wheezing, sputum or hemoptysis   CARDIOVASCULAR: No chest pain, palpitations, or leg swelling  GASTROINTESTINAL: No abdominal pain. No nausea, vomiting, diarrhea or constipation. No melena or hematochezia.  GENITOURINARY: No dysuria, frequency, hematuria, or incontinence  NEUROLOGICAL: No headaches, Dizziness, memory loss, loss of strength, numbness, or tremors  SKIN: No itching, burning, rashes, or lesions   MUSCULOSKELETAL: right knee pain   PSYCHIATRIC: No depression, anxiety, mood swings, or difficulty sleeping  HEME/LYMPH: No easy bruising, or bleeding gums      Vital Signs Last 24 Hrs  T(C): 36 (29 Aug 2018 12:45), Max: 36.7 (29 Aug 2018 05:30)  T(F): 96.8 (29 Aug 2018 12:45), Max: 98 (29 Aug 2018 05:30)  HR: 72 (29 Aug 2018 15:33) (54 - 72)  BP: 125/76 (29 Aug 2018 15:33) (97/53 - 131/71)  BP(mean): --  RR: 17 (29 Aug 2018 15:33) (16 - 17)  SpO2: 97% (29 Aug 2018 15:33) (97% - 100%)    PHYSICAL EXAM:  GENERAL: NAD, obese, well-groomed  HEAD:  Atraumatic, Normocephalic  EYES: conjunctiva and sclera clear  ENMT: Moist mucous membranes  NECK: Supple, No JVD, Normal thyroid  CHEST/LUNG: Clear to Auscultation bilaterally; No rales, rhonchi, wheezing, or rubs  HEART: Regular rate and rhythm; No murmurs, rubs, or gallops  ABDOMEN: Soft, Nontender, Nondistended; Bowel sounds present  EXTREMITIES:  right knee post op tenderness, 2+ Peripheral Pulses, No clubbing, cyanosis, or edema  SKIN: No rashes or lesions  NERVOUS SYSTEM:  Alert & Oriented X3, Good concentration; Motor Strength 5/5 B/L upper and lower extremities    LABS:                        12.2   10.45 )-----------( 209      ( 29 Aug 2018 06:33 )             37.6     08-29    140  |  103  |  21  ----------------------------<  116<H>  4.6   |  25  |  1.18    Ca    8.7      29 Aug 2018 06:33          CAPILLARY BLOOD GLUCOSE              RADIOLOGY & ADDITIONAL TESTS:          Imaging Personally Reviewed:  [ ] YES  [ ] NO    Consultant(s) Notes Reviewed:  [ ] YES  [ ] NO    Care Discussed with Consultants/Other Providers [x ] YES  [ ] NO

## 2018-08-29 NOTE — PROGRESS NOTE ADULT - SUBJECTIVE AND OBJECTIVE BOX
Patient is seen and examined at bedside with Dr. Oleary. Denies CP/SOB/Dizziness/N/V/D/HA. Pain is controlled.     Vital Signs Last 24 Hrs  T(C): 36 (29 Aug 2018 12:45), Max: 36.7 (29 Aug 2018 05:30)  T(F): 96.8 (29 Aug 2018 12:45), Max: 98 (29 Aug 2018 05:30)  HR: 63 (29 Aug 2018 12:06) (54 - 75)  BP: 122/71 (29 Aug 2018 12:06) (97/53 - 131/76)  BP(mean): --  RR: 17 (29 Aug 2018 12:06) (9 - 17)  SpO2: 100% (29 Aug 2018 12:06) (98% - 100%)      PHYSICAL EXAM:  General: NAD, WDWN.   Neuro:  Alert & responsive  HEENT: NCAT, EOMI, conjunctiva clear  RLE: Dressing C/D/I with ACE wrap in place. Underlying PRINEO dressing C/D/I.  B/L LE:  Motor intact + EHL/FHL/TA/GS in the BL LE. Sensation is grossly intact distal . Extremity warm. Compartments are soft. DP 2+    Labs:                          12.2   10.45 )-----------( 209      ( 29 Aug 2018 06:33 )             37.6       08-29    140  |  103  |  21  ----------------------------<  116<H>  4.6   |  25  |  1.18    Ca    8.7      29 Aug 2018 06:33        A/P: Patient is a 63y y/o Female s/p right total knee replacement POD#1  Knee extension/elevation, isometric exercises wound care, medications reviewed with pt - teach back confirmation as well.   -Pain control/analgesia  -Inc spirometry reviewed  -DVT prophylaxis with Venodynes/Aspirin  -PT/OT/WBAT  -DC plan: home tomorrow.

## 2018-08-29 NOTE — OCCUPATIONAL THERAPY INITIAL EVALUATION ADULT - RANGE OF MOTION EXAMINATION, LOWER EXTREMITY
Left LE Active ROM was WFL (within functional limits)/Left LE Passive ROM was WFL (w/i functional limits)/RLE AROM hip flexion WFL, RLE AROM knee flexion limited by more then 25%, RLE AROM distally to knee WFL

## 2018-08-29 NOTE — OCCUPATIONAL THERAPY INITIAL EVALUATION ADULT - SOCIAL CONCERNS
As per patient, "My  works full time and wouldn't be able to help me with any of my daily tasks and needs"./Complex psychosocial needs/coping issues

## 2018-08-29 NOTE — OCCUPATIONAL THERAPY INITIAL EVALUATION ADULT - GENERAL OBSERVATIONS, REHAB EVAL
Pt was encountered OOB in chair fully dressed; NAD, cryocuff on, cardiac monitor on, S/P R TKR POD 1, RLE WBAT, R knee dressing covered with ace wrap clean, dry and intact, AXOX4, cooperative, followed commands; pt c/o pain in R knee (8/10) due to s/p R TKR which impacts pt performance with functional ADL's/transfers and mobility.

## 2018-08-29 NOTE — OCCUPATIONAL THERAPY INITIAL EVALUATION ADULT - TRANSFER SAFETY CONCERNS NOTED: SIT/STAND, REHAB EVAL
decreased balance during turns/inability to maintain weight-bearing restrictions w/o assist/decreased weight-shifting ability/decreased sequencing ability/decreased step length

## 2018-08-29 NOTE — OCCUPATIONAL THERAPY INITIAL EVALUATION ADULT - ADDITIONAL COMMENTS
As per patient and pre-op, patient lives in a private house with 5 entry steps equipped with bilateral wide hand rails. Once inside, pt has to negotiate a flight of stairs (11 steps with R handrail) to access the bedroom and bathroom. The bathroom has a tub/shower combination retractable shower head and standard toilet. Pt is functioning in her roles, self sufficient, driving & ambulating independently in the community without  a single axis cane. Pt c/o 9/10 pain in her right knee ;pt takes Tramadol  PRN for pain relief. Pt is right hand dominant and wears glasses for reading. Pt owns a 3-in-1 commode, rolling walker and SAC. Pt scores 90% of patient specific scale

## 2018-08-30 VITALS
RESPIRATION RATE: 17 BRPM | TEMPERATURE: 98 F | DIASTOLIC BLOOD PRESSURE: 66 MMHG | HEART RATE: 71 BPM | OXYGEN SATURATION: 99 % | SYSTOLIC BLOOD PRESSURE: 124 MMHG

## 2018-08-30 LAB
ANION GAP SERPL CALC-SCNC: 11 MMOL/L — SIGNIFICANT CHANGE UP (ref 5–17)
BUN SERPL-MCNC: 27 MG/DL — HIGH (ref 7–23)
CALCIUM SERPL-MCNC: 8.7 MG/DL — SIGNIFICANT CHANGE UP (ref 8.5–10.1)
CHLORIDE SERPL-SCNC: 105 MMOL/L — SIGNIFICANT CHANGE UP (ref 96–108)
CO2 SERPL-SCNC: 25 MMOL/L — SIGNIFICANT CHANGE UP (ref 22–31)
CREAT SERPL-MCNC: 1.25 MG/DL — SIGNIFICANT CHANGE UP (ref 0.5–1.3)
GLUCOSE SERPL-MCNC: 99 MG/DL — SIGNIFICANT CHANGE UP (ref 70–99)
HCT VFR BLD CALC: 34.5 % — SIGNIFICANT CHANGE UP (ref 34.5–45)
HGB BLD-MCNC: 11.3 G/DL — LOW (ref 11.5–15.5)
MCHC RBC-ENTMCNC: 28.8 PG — SIGNIFICANT CHANGE UP (ref 27–34)
MCHC RBC-ENTMCNC: 32.8 GM/DL — SIGNIFICANT CHANGE UP (ref 32–36)
MCV RBC AUTO: 88 FL — SIGNIFICANT CHANGE UP (ref 80–100)
NRBC # BLD: 0 /100 WBCS — SIGNIFICANT CHANGE UP (ref 0–0)
PLATELET # BLD AUTO: 196 K/UL — SIGNIFICANT CHANGE UP (ref 150–400)
POTASSIUM SERPL-MCNC: 4.6 MMOL/L — SIGNIFICANT CHANGE UP (ref 3.5–5.3)
POTASSIUM SERPL-SCNC: 4.6 MMOL/L — SIGNIFICANT CHANGE UP (ref 3.5–5.3)
RBC # BLD: 3.92 M/UL — SIGNIFICANT CHANGE UP (ref 3.8–5.2)
RBC # FLD: 15.9 % — HIGH (ref 10.3–14.5)
SODIUM SERPL-SCNC: 141 MMOL/L — SIGNIFICANT CHANGE UP (ref 135–145)
SURGICAL PATHOLOGY STUDY: SIGNIFICANT CHANGE UP
WBC # BLD: 7.54 K/UL — SIGNIFICANT CHANGE UP (ref 3.8–10.5)
WBC # FLD AUTO: 7.54 K/UL — SIGNIFICANT CHANGE UP (ref 3.8–10.5)

## 2018-08-30 RX ORDER — ASPIRIN/CALCIUM CARB/MAGNESIUM 324 MG
1 TABLET ORAL
Qty: 60 | Refills: 0 | OUTPATIENT
Start: 2018-08-30 | End: 2018-09-28

## 2018-08-30 RX ORDER — ACETAMINOPHEN 500 MG
3 TABLET ORAL
Qty: 0 | Refills: 0 | COMMUNITY
Start: 2018-08-30

## 2018-08-30 RX ORDER — OXYCODONE HYDROCHLORIDE 5 MG/1
2 TABLET ORAL
Qty: 84 | Refills: 0 | OUTPATIENT
Start: 2018-08-30 | End: 2018-09-05

## 2018-08-30 RX ORDER — TRAMADOL HYDROCHLORIDE 50 MG/1
1 TABLET ORAL
Qty: 0 | Refills: 0 | COMMUNITY

## 2018-08-30 RX ORDER — ASCORBIC ACID 60 MG
1 TABLET,CHEWABLE ORAL
Qty: 0 | Refills: 0 | COMMUNITY
Start: 2018-08-30

## 2018-08-30 RX ORDER — PANTOPRAZOLE SODIUM 20 MG/1
1 TABLET, DELAYED RELEASE ORAL
Qty: 0 | Refills: 0 | COMMUNITY
Start: 2018-08-30

## 2018-08-30 RX ORDER — CELECOXIB 200 MG/1
1 CAPSULE ORAL
Qty: 60 | Refills: 0 | OUTPATIENT
Start: 2018-08-30 | End: 2018-09-28

## 2018-08-30 RX ORDER — CELECOXIB 200 MG/1
1 CAPSULE ORAL
Qty: 0 | Refills: 0 | COMMUNITY
Start: 2018-08-30

## 2018-08-30 RX ORDER — ASPIRIN/CALCIUM CARB/MAGNESIUM 324 MG
1 TABLET ORAL
Qty: 0 | Refills: 0 | COMMUNITY
Start: 2018-08-30

## 2018-08-30 RX ORDER — DOCUSATE SODIUM 100 MG
1 CAPSULE ORAL
Qty: 0 | Refills: 0 | COMMUNITY
Start: 2018-08-30

## 2018-08-30 RX ORDER — PANTOPRAZOLE SODIUM 20 MG/1
1 TABLET, DELAYED RELEASE ORAL
Qty: 30 | Refills: 0 | OUTPATIENT
Start: 2018-08-30 | End: 2018-09-28

## 2018-08-30 RX ORDER — ATORVASTATIN CALCIUM 80 MG/1
1 TABLET, FILM COATED ORAL
Qty: 0 | Refills: 0 | COMMUNITY

## 2018-08-30 RX ADMIN — SODIUM CHLORIDE 3 MILLILITER(S): 9 INJECTION INTRAMUSCULAR; INTRAVENOUS; SUBCUTANEOUS at 05:06

## 2018-08-30 RX ADMIN — POLYETHYLENE GLYCOL 3350 17 GRAM(S): 17 POWDER, FOR SOLUTION ORAL at 11:02

## 2018-08-30 RX ADMIN — Medication 325 MILLIGRAM(S): at 11:05

## 2018-08-30 RX ADMIN — OXYCODONE HYDROCHLORIDE 10 MILLIGRAM(S): 5 TABLET ORAL at 05:27

## 2018-08-30 RX ADMIN — Medication 500 MILLIGRAM(S): at 05:06

## 2018-08-30 RX ADMIN — OXYCODONE HYDROCHLORIDE 10 MILLIGRAM(S): 5 TABLET ORAL at 10:39

## 2018-08-30 RX ADMIN — Medication 1 TABLET(S): at 11:02

## 2018-08-30 RX ADMIN — CELECOXIB 200 MILLIGRAM(S): 200 CAPSULE ORAL at 05:06

## 2018-08-30 RX ADMIN — PANTOPRAZOLE SODIUM 40 MILLIGRAM(S): 20 TABLET, DELAYED RELEASE ORAL at 11:02

## 2018-08-30 RX ADMIN — OXYCODONE HYDROCHLORIDE 10 MILLIGRAM(S): 5 TABLET ORAL at 11:45

## 2018-08-30 RX ADMIN — OXYCODONE HYDROCHLORIDE 10 MILLIGRAM(S): 5 TABLET ORAL at 04:27

## 2018-08-30 RX ADMIN — Medication 100 MILLIGRAM(S): at 05:06

## 2018-08-30 RX ADMIN — Medication 1 MILLIGRAM(S): at 11:02

## 2018-08-30 RX ADMIN — Medication 325 MILLIGRAM(S): at 05:06

## 2018-08-30 RX ADMIN — SODIUM CHLORIDE 3 MILLILITER(S): 9 INJECTION INTRAMUSCULAR; INTRAVENOUS; SUBCUTANEOUS at 01:35

## 2018-08-30 RX ADMIN — Medication 325 MILLIGRAM(S): at 08:55

## 2018-08-30 RX ADMIN — Medication 975 MILLIGRAM(S): at 05:06

## 2018-08-30 NOTE — PROGRESS NOTE ADULT - SUBJECTIVE AND OBJECTIVE BOX
Patient is seen in am and examined at bedside. Denies CP/SOB/Dizziness/N/V/D/HA. Pain is controlled.     Vital Signs Last 24 Hrs  T(C): 36.2 (30 Aug 2018 05:13), Max: 36.8 (29 Aug 2018 17:40)  T(F): 97.1 (30 Aug 2018 05:13), Max: 98.3 (29 Aug 2018 17:40)  HR: 58 (30 Aug 2018 05:13) (57 - 72)  BP: 124/71 (30 Aug 2018 05:13) (113/69 - 154/71)  BP(mean): --  RR: 18 (30 Aug 2018 05:13) (16 - 19)  SpO2: 100% (30 Aug 2018 05:13) (96% - 100%)      PHYSICAL EXAM:  General: NAD, WDWN.   Neuro:  Alert & responsive  HEENT: NCAT, EOMI, conjunctiva clear   RLE: Prineo Dressing C/D/I .   B/L LE Motor intact + EHL/FHL/TA/GS in the BL LE. Sensation is grossly intact distal . Extremity warm. Compartments are soft. DP 2+    Labs:                          11.3   7.54  )-----------( 196      ( 30 Aug 2018 06:13 )             34.5       08-30    141  |  105  |  27<H>  ----------------------------<  99  4.6   |  25  |  1.25    Ca    8.7      30 Aug 2018 06:11        A/P: Patient is a 63y y/o Female s/p Right total knee replacement POD#2  -wound care, leg extension and elevation, medication and discharge planning reviewed with patient - teach back confirmation  -Pain control/analgesia  -Inc spirometry reviewed  -DVT prophylaxis with Venodynes/Aspirin  -PT/OT/WBAT  -DC plan - home today Patient is seen in am and examined at bedside. Denies CP/SOB/Dizziness/N/V/D/HA. Pain is controlled.     Vital Signs Last 24 Hrs  T(C): 36.2 (30 Aug 2018 05:13), Max: 36.8 (29 Aug 2018 17:40)  T(F): 97.1 (30 Aug 2018 05:13), Max: 98.3 (29 Aug 2018 17:40)  HR: 58 (30 Aug 2018 05:13) (57 - 72)  BP: 124/71 (30 Aug 2018 05:13) (113/69 - 154/71)  BP(mean): --  RR: 18 (30 Aug 2018 05:13) (16 - 19)  SpO2: 100% (30 Aug 2018 05:13) (96% - 100%)      PHYSICAL EXAM:  General: NAD, WDWN.   Neuro:  Alert & responsive  HEENT: NCAT, EOMI, conjunctiva clear   RLE: Prineo Dressing C/D/I .   B/L LE Motor intact + EHL/FHL/TA/GS in the BL LE. Sensation is grossly intact distal . Extremity warm. Compartments are soft. DP 2+    Labs:                          11.3   7.54  )-----------( 196      ( 30 Aug 2018 06:13 )             34.5       08-30    141  |  105  |  27<H>  ----------------------------<  99  4.6   |  25  |  1.25    Ca    8.7      30 Aug 2018 06:11        A/P: Patient is a 63y y/o Female s/p Right total knee replacement POD#2  -wound care, leg extension and elevation, medication and discharge planning reviewed with patient - teach back confirmation  -Pain control/analgesia  -Inc spirometry reviewed  -DVT prophylaxis with Venodynes/Aspirin  -PT/OT/WBAT  -Celebrex DC'd (called outpatient pharmacy to discontinue) because of trending creatinine (Dr. ramirez)   -DALE plan - home today

## 2018-09-03 DIAGNOSIS — G47.33 OBSTRUCTIVE SLEEP APNEA (ADULT) (PEDIATRIC): ICD-10-CM

## 2018-09-03 DIAGNOSIS — I10 ESSENTIAL (PRIMARY) HYPERTENSION: ICD-10-CM

## 2018-09-03 DIAGNOSIS — Z85.3 PERSONAL HISTORY OF MALIGNANT NEOPLASM OF BREAST: ICD-10-CM

## 2018-09-03 DIAGNOSIS — Z96.642 PRESENCE OF LEFT ARTIFICIAL HIP JOINT: ICD-10-CM

## 2018-09-03 DIAGNOSIS — M17.11 UNILATERAL PRIMARY OSTEOARTHRITIS, RIGHT KNEE: ICD-10-CM

## 2018-09-03 DIAGNOSIS — Z88.0 ALLERGY STATUS TO PENICILLIN: ICD-10-CM

## 2018-09-03 DIAGNOSIS — E66.9 OBESITY, UNSPECIFIED: ICD-10-CM

## 2018-10-08 NOTE — H&P PST ADULT - ABILITY TO HEAR (WITH HEARING AID OR HEARING APPLIANCE IF NORMALLY USED):
Eyes with no visual disturbances.  Ears clean and dry and no hearing difficulties. Nose with pink mucosa and no drainage.  Mouth mucous membranes moist and pink.  No tenderness or swelling to throat or neck. Adequate: hears normal conversation without difficulty

## 2018-10-31 ENCOUNTER — EMERGENCY (EMERGENCY)
Facility: HOSPITAL | Age: 63
LOS: 1 days | Discharge: HOME | End: 2018-10-31
Attending: EMERGENCY MEDICINE | Admitting: EMERGENCY MEDICINE
Payer: COMMERCIAL

## 2018-10-31 VITALS
TEMPERATURE: 99 F | RESPIRATION RATE: 20 BRPM | DIASTOLIC BLOOD PRESSURE: 67 MMHG | OXYGEN SATURATION: 100 % | SYSTOLIC BLOOD PRESSURE: 124 MMHG | HEART RATE: 65 BPM

## 2018-10-31 VITALS
DIASTOLIC BLOOD PRESSURE: 57 MMHG | RESPIRATION RATE: 17 BRPM | HEART RATE: 59 BPM | TEMPERATURE: 98 F | SYSTOLIC BLOOD PRESSURE: 119 MMHG | OXYGEN SATURATION: 100 %

## 2018-10-31 DIAGNOSIS — Z98.890 OTHER SPECIFIED POSTPROCEDURAL STATES: Chronic | ICD-10-CM

## 2018-10-31 DIAGNOSIS — Z96.642 PRESENCE OF LEFT ARTIFICIAL HIP JOINT: Chronic | ICD-10-CM

## 2018-10-31 LAB
ALBUMIN SERPL ELPH-MCNC: 4 G/DL — SIGNIFICANT CHANGE UP (ref 3.3–5)
ALP SERPL-CCNC: 114 U/L — SIGNIFICANT CHANGE UP (ref 40–120)
ALT FLD-CCNC: 9 U/L — SIGNIFICANT CHANGE UP (ref 4–33)
APTT BLD: 38.1 SEC — HIGH (ref 27.5–37.4)
AST SERPL-CCNC: 18 U/L — SIGNIFICANT CHANGE UP (ref 4–32)
BASOPHILS # BLD AUTO: 0.01 K/UL — SIGNIFICANT CHANGE UP (ref 0–0.2)
BASOPHILS NFR BLD AUTO: 0.3 % — SIGNIFICANT CHANGE UP (ref 0–2)
BASOPHILS NFR SPEC: 0.9 % — SIGNIFICANT CHANGE UP (ref 0–2)
BILIRUB SERPL-MCNC: 0.5 MG/DL — SIGNIFICANT CHANGE UP (ref 0.2–1.2)
BLASTS # FLD: 0 % — SIGNIFICANT CHANGE UP (ref 0–0)
BUN SERPL-MCNC: 14 MG/DL — SIGNIFICANT CHANGE UP (ref 7–23)
CALCIUM SERPL-MCNC: 9.9 MG/DL — SIGNIFICANT CHANGE UP (ref 8.4–10.5)
CHLORIDE SERPL-SCNC: 98 MMOL/L — SIGNIFICANT CHANGE UP (ref 98–107)
CO2 SERPL-SCNC: 27 MMOL/L — SIGNIFICANT CHANGE UP (ref 22–31)
CREAT SERPL-MCNC: 0.88 MG/DL — SIGNIFICANT CHANGE UP (ref 0.5–1.3)
EOSINOPHIL # BLD AUTO: 0.04 K/UL — SIGNIFICANT CHANGE UP (ref 0–0.5)
EOSINOPHIL NFR BLD AUTO: 1.1 % — SIGNIFICANT CHANGE UP (ref 0–6)
EOSINOPHIL NFR FLD: 0 % — SIGNIFICANT CHANGE UP (ref 0–6)
GIANT PLATELETS BLD QL SMEAR: PRESENT — SIGNIFICANT CHANGE UP
GLUCOSE SERPL-MCNC: 87 MG/DL — SIGNIFICANT CHANGE UP (ref 70–99)
HCT VFR BLD CALC: 37.6 % — SIGNIFICANT CHANGE UP (ref 34.5–45)
HGB BLD-MCNC: 12.4 G/DL — SIGNIFICANT CHANGE UP (ref 11.5–15.5)
IMM GRANULOCYTES # BLD AUTO: 0.01 # — SIGNIFICANT CHANGE UP
IMM GRANULOCYTES NFR BLD AUTO: 0.3 % — SIGNIFICANT CHANGE UP (ref 0–1.5)
INR BLD: 1.1 — SIGNIFICANT CHANGE UP (ref 0.88–1.17)
LYMPHOCYTES # BLD AUTO: 1.28 K/UL — SIGNIFICANT CHANGE UP (ref 1–3.3)
LYMPHOCYTES # BLD AUTO: 35.2 % — SIGNIFICANT CHANGE UP (ref 13–44)
LYMPHOCYTES NFR SPEC AUTO: 23.5 % — SIGNIFICANT CHANGE UP (ref 13–44)
MANUAL SMEAR VERIFICATION: SIGNIFICANT CHANGE UP
MCHC RBC-ENTMCNC: 29 PG — SIGNIFICANT CHANGE UP (ref 27–34)
MCHC RBC-ENTMCNC: 33 % — SIGNIFICANT CHANGE UP (ref 32–36)
MCV RBC AUTO: 88.1 FL — SIGNIFICANT CHANGE UP (ref 80–100)
METAMYELOCYTES # FLD: 0 % — SIGNIFICANT CHANGE UP (ref 0–1)
MONOCYTES # BLD AUTO: 0.72 K/UL — SIGNIFICANT CHANGE UP (ref 0–0.9)
MONOCYTES NFR BLD AUTO: 19.8 % — HIGH (ref 2–14)
MONOCYTES NFR BLD: 17.4 % — HIGH (ref 2–9)
MORPHOLOGY BLD-IMP: NORMAL — SIGNIFICANT CHANGE UP
MYELOCYTES NFR BLD: 0 % — SIGNIFICANT CHANGE UP (ref 0–0)
NEUTROPHIL AB SER-ACNC: 49.5 % — SIGNIFICANT CHANGE UP (ref 43–77)
NEUTROPHILS # BLD AUTO: 1.58 K/UL — LOW (ref 1.8–7.4)
NEUTROPHILS NFR BLD AUTO: 43.3 % — SIGNIFICANT CHANGE UP (ref 43–77)
NEUTS BAND # BLD: 0 % — SIGNIFICANT CHANGE UP (ref 0–6)
NRBC # FLD: 0 — SIGNIFICANT CHANGE UP
OTHER - HEMATOLOGY %: 0 — SIGNIFICANT CHANGE UP
PLATELET # BLD AUTO: 219 K/UL — SIGNIFICANT CHANGE UP (ref 150–400)
PLATELET COUNT - ESTIMATE: NORMAL — SIGNIFICANT CHANGE UP
PMV BLD: 10.1 FL — SIGNIFICANT CHANGE UP (ref 7–13)
POTASSIUM SERPL-MCNC: 3.3 MMOL/L — LOW (ref 3.5–5.3)
POTASSIUM SERPL-SCNC: 3.3 MMOL/L — LOW (ref 3.5–5.3)
PROMYELOCYTES # FLD: 0 % — SIGNIFICANT CHANGE UP (ref 0–0)
PROT SERPL-MCNC: 7.8 G/DL — SIGNIFICANT CHANGE UP (ref 6–8.3)
PROTHROM AB SERPL-ACNC: 12.7 SEC — SIGNIFICANT CHANGE UP (ref 9.8–13.1)
RBC # BLD: 4.27 M/UL — SIGNIFICANT CHANGE UP (ref 3.8–5.2)
RBC # FLD: 14.1 % — SIGNIFICANT CHANGE UP (ref 10.3–14.5)
SODIUM SERPL-SCNC: 138 MMOL/L — SIGNIFICANT CHANGE UP (ref 135–145)
TROPONIN T, HIGH SENSITIVITY: 7 NG/L — SIGNIFICANT CHANGE UP (ref ?–14)
TROPONIN T, HIGH SENSITIVITY: 7 NG/L — SIGNIFICANT CHANGE UP (ref ?–14)
VARIANT LYMPHS # BLD: 8.7 % — SIGNIFICANT CHANGE UP
WBC # BLD: 3.64 K/UL — LOW (ref 3.8–10.5)
WBC # FLD AUTO: 3.64 K/UL — LOW (ref 3.8–10.5)

## 2018-10-31 PROCEDURE — 71046 X-RAY EXAM CHEST 2 VIEWS: CPT | Mod: 26

## 2018-10-31 PROCEDURE — 99284 EMERGENCY DEPT VISIT MOD MDM: CPT

## 2018-10-31 RX ORDER — KETOROLAC TROMETHAMINE 30 MG/ML
15 SYRINGE (ML) INJECTION ONCE
Qty: 0 | Refills: 0 | Status: DISCONTINUED | OUTPATIENT
Start: 2018-10-31 | End: 2018-10-31

## 2018-10-31 RX ADMIN — Medication 15 MILLIGRAM(S): at 16:24

## 2018-10-31 NOTE — ED PROVIDER NOTE - FAMILY HISTORY
Mother  Still living? No  Family history of sepsis, Age at diagnosis: Age Unknown     Sibling  Still living? No  Family history of breast cancer in sister, Age at diagnosis: Age Unknown  Family history of multiple myeloma, Age at diagnosis: Age Unknown

## 2018-10-31 NOTE — ED PROVIDER NOTE - PROGRESS NOTE DETAILS
stephen gibbs: pt xray showing possible consolidation and to coorelate clinically for pneumonia- discussed with patient( and Dr. Maria)) who also agrees unlikely pneumonia given clinically no signs of fever or cough, labs wnl. discussed will need to have a repeat xray with her PMD and also to return if symptoms develop. Agrees with plan, troponin repeat stable, feeling better after toradol,pt given copies of reports, ready for dc with return precautions.

## 2018-10-31 NOTE — ED PROVIDER NOTE - ATTENDING CONTRIBUTION TO CARE
I performed a face to face bedside interview with patient regarding history of present illness, review of symptoms and past medical history. I completed an independent physical exam.  I have discussed patient's plan of care.   I agree with note as stated above, having amended the EMR as needed to reflect my findings. I have discussed the assessment and plan of care.  This includes during the time I functioned as the attending physician for this patient.  Attending Contribution to Care: agree with plan of PA. pmhx of HTN, HLD here with L sided upper back pain that is worse with movement and laying on her side and palpation that radiates to her inferior L breast. trop neg. pt stable for d/c. will f/u with cardio.

## 2018-10-31 NOTE — ED PROVIDER NOTE - PHYSICAL EXAMINATION
TTP over L trapezius, chest wall and L breast  - chaperone dr. sandoval  no rash or overlying skin changes  no LE swelling, no calf tenderness

## 2018-10-31 NOTE — ED ADULT NURSE NOTE - MUSCULOSKELETAL ASSESSMENT
Mary was seen today for pain.    Diagnoses and all orders for this visit:    Myofascial muscle pain  -     NO CHARGE LOS      Trigger points were identified by patient, and marked when appropriate.  The area was prepped with Chloroprep.    Using clean technique, injections were completed using a 25G, 3.5 inch needle.  After negative aspiration, injection was completed.  A total of 5 locations were injected.  When possible, tissue was retracted from the chest wall to avoid lung injury.    Muscle groups injected:  Rhomboids, external/internal intercostal muscles.     Injection solution contained:  10ml of 0.5% bupivacaine, and 40mg of Depomedrol.    Breath sounds were normal.     Images stored on the device    Ike Silverio MD  [facility]      
WDL

## 2018-10-31 NOTE — ED PROVIDER NOTE - OBJECTIVE STATEMENT
63 y.o female pmhx of HTN, HLD here with L sided upper back pain worse with movement and palpation that radiates to her inferior L breast. 63 y.o female pmhx of HTN, HLD here with L sided upper back pain that is worse with movement and laying on her side and palpation that radiates to her inferior L breast. Not associated with any SOB or palpitations. Pt denies fevers, chills, cough, n/v/d, abdominal pain, numbess, tingling, weakness, urinary symptoms. No recent travel, no LE swelling.

## 2018-10-31 NOTE — ED ADULT NURSE NOTE - OBJECTIVE STATEMENT
patient reports she awoke this AM with chest pain beginning in the center of her back that radiates to the center of the chest as well as L arm. pt denies PMH aside from well controlled HTN, R hip and R knee replacements. patient denies heavy lifting or activity however pain is reproducible upon palpation of sternum and pain reports pain is worse with movement. Patient denies SOB or cough LS clear. denies falls or trauma. Radial pulses are equal and strong. VSS, IV 20 go RAC labs sent TQ remvoed. CCM in place NSR.

## 2018-10-31 NOTE — ED PROVIDER NOTE - MEDICAL DECISION MAKING DETAILS
3 y.o female pmhx of HTN, HLD here with L sided upper back pain that is worse with movement and laying on her side and palpation that radiates to her inferior L breast. Pain very reproducible on physical exam. Will obtain labs, troponin,  xray chest, EKG, pain control, reassess.

## 2018-10-31 NOTE — ED ADULT NURSE NOTE - NSIMPLEMENTINTERV_GEN_ALL_ED
Implemented All Fall with Harm Risk Interventions:  [place] to call system. Call bell, personal items and telephone within reach. Instruct patient to call for assistance. Room bathroom lighting operational. Non-slip footwear when patient is off stretcher. Physically safe environment: no spills, clutter or unnecessary equipment. Stretcher in lowest position, wheels locked, appropriate side rails in place. Provide visual cue, wrist band, yellow gown, etc. Monitor gait and stability. Monitor for mental status changes and reorient to person, place, and time. Review medications for side effects contributing to fall risk. Reinforce activity limits and safety measures with patient and family. Provide visual clues: red socks.

## 2018-10-31 NOTE — ED PROVIDER NOTE - CARE PLAN
Assessment and plan of treatment:	Rest, drink plenty of fluids.  Advance activity as tolerated.  Continue all previously prescribed medications as directed.  Follow up with your primary care physician in 48-72 hours- bring copies of your results.  Return to the Emergency Department for worsening or persistent symptoms OR ANY NEW OR CONCERNING SYMPTOMS. Principal Discharge DX:	Thoracic back pain, unspecified back pain laterality, unspecified chronicity  Assessment and plan of treatment:	Rest, drink plenty of fluids.  Advance activity as tolerated.  Continue all previously prescribed medications as directed.  Follow up with your primary care physician in 48-72 hours- bring copies of your results.  Return to the Emergency Department for worsening or persistent symptoms OR ANY NEW OR CONCERNING SYMPTOMS.

## 2018-10-31 NOTE — ED ADULT NURSE NOTE - CHPI ED NUR SYMPTOMS NEG
no dizziness/no fever/no shortness of breath/no nausea/no congestion/no diaphoresis/no vomiting/no syncope/no chills

## 2018-12-30 ENCOUNTER — EMERGENCY (EMERGENCY)
Facility: HOSPITAL | Age: 63
LOS: 1 days | Discharge: HOME | End: 2018-12-30
Attending: EMERGENCY MEDICINE | Admitting: EMERGENCY MEDICINE
Payer: COMMERCIAL

## 2018-12-30 VITALS
OXYGEN SATURATION: 98 % | RESPIRATION RATE: 18 BRPM | HEART RATE: 78 BPM | TEMPERATURE: 98 F | SYSTOLIC BLOOD PRESSURE: 146 MMHG | DIASTOLIC BLOOD PRESSURE: 85 MMHG

## 2018-12-30 DIAGNOSIS — Z98.890 OTHER SPECIFIED POSTPROCEDURAL STATES: Chronic | ICD-10-CM

## 2018-12-30 DIAGNOSIS — Z96.642 PRESENCE OF LEFT ARTIFICIAL HIP JOINT: Chronic | ICD-10-CM

## 2018-12-30 LAB

## 2018-12-30 PROCEDURE — 93010 ELECTROCARDIOGRAM REPORT: CPT | Mod: NC

## 2018-12-30 PROCEDURE — 71046 X-RAY EXAM CHEST 2 VIEWS: CPT | Mod: 26

## 2018-12-30 PROCEDURE — 99284 EMERGENCY DEPT VISIT MOD MDM: CPT | Mod: 25

## 2018-12-30 RX ORDER — LIDOCAINE 4 G/100G
1 CREAM TOPICAL ONCE
Qty: 0 | Refills: 0 | Status: COMPLETED | OUTPATIENT
Start: 2018-12-30 | End: 2018-12-30

## 2018-12-30 RX ORDER — ACETAMINOPHEN 500 MG
975 TABLET ORAL ONCE
Qty: 0 | Refills: 0 | Status: COMPLETED | OUTPATIENT
Start: 2018-12-30 | End: 2018-12-30

## 2018-12-30 RX ORDER — KETOROLAC TROMETHAMINE 30 MG/ML
15 SYRINGE (ML) INJECTION ONCE
Qty: 0 | Refills: 0 | Status: DISCONTINUED | OUTPATIENT
Start: 2018-12-30 | End: 2018-12-30

## 2018-12-30 RX ADMIN — Medication 15 MILLIGRAM(S): at 12:10

## 2018-12-30 RX ADMIN — Medication 975 MILLIGRAM(S): at 12:10

## 2018-12-30 RX ADMIN — LIDOCAINE 1 PATCH: 4 CREAM TOPICAL at 12:10

## 2018-12-30 NOTE — ED ADULT NURSE NOTE - NSIMPLEMENTINTERV_GEN_ALL_ED
Implemented All Universal Safety Interventions:  [place] to call system. Call bell, personal items and telephone within reach. Instruct patient to call for assistance. Room bathroom lighting operational. Non-slip footwear when patient is off stretcher. Physically safe environment: no spills, clutter or unnecessary equipment. Stretcher in lowest position, wheels locked, appropriate side rails in place.

## 2018-12-30 NOTE — ED PROVIDER NOTE - OBJECTIVE STATEMENT
62yo F h/o HTN, HLD p/w cc of CP    Pt had onset of back pain radiating to the front which worsened overnight and prevented her from sleeping, associated with nausea/SOB this AM, also worse w/ movement. No recent trauma/coughing/flights, no leg swelling, no F/C.

## 2018-12-30 NOTE — ED PROVIDER NOTE - NS ED ROS FT
CONSTITUTIONAL: No fevers, no chills  Eyes: no visual changes  Mouth/Throat: no sore throat  Cardiovascular: refer to HPI  Gastrointestinal: No v/d, no abd pain  Genitourinary: no dysuria, no hematuria  SKIN: no rashes.  NEURO: no headache, no weakness or numbness  PSYCHIATRIC: no known mental health issues.

## 2018-12-30 NOTE — ED ADULT TRIAGE NOTE - CHIEF COMPLAINT QUOTE
p/t c/o of lt sided chest discomfort since last night, denies any sob nad noted, p/t seen recently  for same issue in ED

## 2018-12-30 NOTE — ED PROVIDER NOTE - NSFOLLOWUPINSTRUCTIONS_ED_ALL_ED_FT
(1) Follow up with your primary care physician as discussed  (2) Immediately seek care at your nearest emergency room if your worsen, persist, or do not resolve   (3) Make sure to follow up the xray results attached with your doctor as discussed

## 2018-12-30 NOTE — ED ADULT NURSE NOTE - CHPI ED NUR SYMPTOMS NEG
no nausea/no tingling/no dizziness/no decreased eating/drinking/no chills/no pain/no vomiting/no weakness/no fever

## 2018-12-30 NOTE — ED ADULT NURSE NOTE - OBJECTIVE STATEMENT
pt AOX3 coming  with left breast pain rad. to left upper back x 2 days, denies any discharged for breast/no pain to chest area/no dizziness/no SOB.  pt stated pain increasing with left arm movements, pt with Hx. left breast lumpectomy on PO chemo, medicated to pain, EKG NSR, lungs clear, resp. equal 18-20b/min   IV to right AC 20g angio cath.    pending dispo.     Yoselin Mai RN

## 2018-12-30 NOTE — ED PROVIDER NOTE - PROGRESS NOTE DETAILS
dayana pgy1:  spoke W/ Dr. Ricci Chew, comfortable w/ pt not getting cardiac workup and following up tomorrow at 10am. Manuel pgy1: spoke w/ pt about xray results and need for further workup, pt comfortable following up with pcp tomorrow for further workup of CP and xray results. Pain much improved, tolerating PO.

## 2018-12-30 NOTE — ED PROVIDER NOTE - ATTENDING CONTRIBUTION TO CARE
rosie: 2 month hx of pain to left upper back, let shoulder, left lateral chest and left chest. happened over 20 times, always same area, always related to movement or upper body, and always lasting 2-3 hours or longer. came to ED today because pain has been constant for over 24 hours. no nausea, sweating or shortness of breath. seen in ED oct 31 for similar sx and found to have nl troponins and discharged to opt f/o. Pt states she was subsequently seen by Dr Courtney who did not dx this a s cardiac-related pain. Pt states she had a normal stress test ('injected') feb 2018 pre-op hip surgery.  pmh includes htn and elevated cholesterol.  exam: NAD. THere is reproducible tenderness to palpation to f scalpula, left lateral ribs, left anterior chest wall. Full rom left shoulder as well as neck.   There is mild tenderness left shoulder.   ekg: sinus 76. rosie: 2 month hx of pain to left upper back, let shoulder, left lateral chest and left chest. happened over 20 times, always same area, always related to movement or upper body, and always lasting 2-3 hours or longer. came to ED today because pain has been constant for over 24 hours. no nausea, sweating or shortness of breath. seen in ED oct 31 for similar sx and found to have nl troponins and discharged to opt f/o. Pt states she was subsequently seen by Dr Courtney who did not dx this a s cardiac-related pain. Pt states she had a normal stress test ('injected') feb 2018 pre-op hip surgery.  pmh includes htn and elevated cholesterol.   pt takes tramadol at home for pain  exam: NAD. There is reproducible tenderness to palpation to f scalpula, left lateral ribs, left anterior chest wall. Full rom left shoulder as well as neck.   There is mild tenderness left shoulder.   ekg: sinus 76. no evidence acute ischemia. appears similar to oct 31 2018.   impression: musculoskeletal pain  recc: lidocaine patch and tylenol. f/u Dr Hernandez

## 2018-12-30 NOTE — ED PROVIDER NOTE - PHYSICAL EXAMINATION
General: well appearing, interactive, well nourished, no apparent distress  CV: regular rhythm, normal S1 and S2 with no murmur  Resp: lungs clear to auscultation bilaterally, symmetric chest wall rise  Abd: soft, nontender, normoactive bowel sounds  : no CVA tenderness  Neuro: moving all extremities   Skin: no rashes, skin intact General: well appearing, interactive, well nourished, no apparent distress  CV: regular rhythm, normal S1 and S2 with no murmur, CP reproducible w/ palpation   Resp: lungs clear to auscultation bilaterally, symmetric chest wall rise  Abd: soft, nontender, normoactive bowel sounds  : no CVA tenderness  Neuro: moving all extremities   MSK: Tender to shoulder palpation and rotator cuff movements, full active ROM  Skin: no rashes, skin intact

## 2019-01-17 NOTE — OCCUPATIONAL THERAPY INITIAL EVALUATION ADULT - ADDITIONAL COMMENTS
----- Message from Marilee Lowe sent at 1/17/2019  3:59 PM CST -----  Contact: PT Portal Request  Appointment Request From: Ashu Thomas    With Provider: Shiv Dunlap    Preferred Date Range: 1/21/2019 - 1/25/2019    Preferred Times: Any time    Reason for visit: left shoulder pain, tear    Comments:  I previously seen Dr. Dunlap a while back and would like to schedule an appointment.          PT was last seen in 2013   Prior to admission, pt was functioning in her roles, self sufficient & ambulating independently wit a single axis cane . presently , pt needs more assistance with lower body self care tasks than before due to left THR. The scale below depicts a picture of the pt's current level of functioning. Barthel Index: Feeding Score____10__, Bathing Score____0__, Grooming Score___5__, Dressing Score___5__, Bowel Score__10___, Bladder Score___10___, Toilet Score___10__, Transfer Score____10__, Mobility Score__10___, Stairs Score__5___, Total Score___75/100__. Prior to admission, pt was functioning in her roles, self sufficient & ambulating independently wit a single axis cane. Presently , pt needs more assistance with lower body self care tasks than before due to left THR. The scale below depicts a picture of the pt's current level of functioning. Barthel Index: Feeding Score____10__, Bathing Score____0__, Grooming Score___5__, Dressing Score___5__, Bowel Score__10___, Bladder Score___10___, Toilet Score___10__, Transfer Score____10__, Mobility Score__10___, Stairs Score__5___, Total Score___75/100__.

## 2019-02-06 NOTE — OCCUPATIONAL THERAPY INITIAL EVALUATION ADULT - GROOMING, PREVIOUS LEVEL OF FUNCTION, OT EVAL
I have reviewed Active Medication List, Allergies, Vital Signs, Active Problem List, Past Medical History, Past Surgical History, Social History and Family History. Chief Complaint   Patient presents with   â¢ Pre-Op Exam     Surgery 2-18-19 / Dr. Dagoberto Castillo at [facility] in [address] T12 compression          Subjective:    Chief Complaint: I am asked to see Dariel Torrez for a preoperative evaluation by Dr. Morris Gannon. I am asked to assess the risk of anesthesia  due to:  Perioperative management of medical therapy, A request from the surgeon, HTN and Diabetes. He has good exercise tolerance. Reports being able to walk for 30 minutes without symptoms of chest pain/ chest pressure or shortness of breath. Dariel Torrez denies any previous history of excessive bleeding with previous surgeries. He denies any previous reactions to anesthesia unless otherwise noted above. He states he is otherwise feeling well and without any other concerns at this time. He also has a history of hypertension. STATUS: well controlled. He denies chest pain, chest pressure, shortness of breath, headache or dizziness. His history is also notable for diabetes. STATUS: suboptimally controlled. denies polyuria or polydipsia. Checking Blood sugars daily. Recently had a pneumonia with postinfectious bronchospasm. Uses Symbicort. Chest x-ray showed resolution of pneumonia. Dyspnea is resolved    A complete review of systems was performed. Pertinent's are noted above and otherwise was negative.      Past medical history:  Past Medical History:   Diagnosis Date   â¢ Arthritis    â¢ Colon polyp    â¢ Diabetes mellitus (CMS/[facility])     insulin 2013   â¢ Eczema    â¢ Essential (primary) hypertension    â¢ Fracture    â¢ Gastroesophageal reflux disease    â¢ Other and unspecified hyperlipidemia    â¢ Other chronic pain     low back pain radiating to knees, wears back brace with increase pain   â¢ Seasonal allergies         Past surgical history:  Past Surgical History:   Procedure Laterality Date   â¢ Abdomen surgery     â¢ Appendectomy  age 21   â¢ Back surgery  2010    hardware placed   â¢ Colonoscopy  10/31/2018    polyp. to repeat in 5 yrs. â¢ Eye surgery     â¢ Fracture surgery      shoulder   â¢ Hernia repair     â¢ Shoulder surgery  age 23    left        Patient Active Problem List   Diagnosis   â¢ Type II or unspecified type diabetes mellitus with ophthalmic manifestations, uncontrolled   â¢ HTN (hypertension)   â¢ Hyperlipidemia   â¢ Moderate nonproliferative diabetic retinopathy (CMS/HCC)   â¢ Diabetic macular edema(362.07)   â¢ Eczema of left hand   â¢ Chronic bilateral low back pain with bilateral sciatica   â¢ Lumbar postlaminectomy syndrome   â¢ Leucocytosis   â¢ Encounter for preventive health examination   â¢ Abdominal pain   â¢ Type 2 diabetes mellitus with diabetic polyneuropathy, with long-term current use of insulin (CMS/HCC)   â¢ Type 2 diabetes mellitus with both eyes affected by moderate nonproliferative retinopathy and macular edema, with long-term current use of insulin (CMS/HCC)   â¢ Colon polyp   â¢ Long-term insulin use (CMS/HCC)   â¢ Gastroesophageal reflux disease   â¢ Intermittent monocular exotropia   â¢ Nuclear sclerotic cataract of both eyes       Medications:  Current Outpatient Medications   Medication Sig Dispense Refill   â¢ amLODIPine (NORVASC) 5 MG tablet Take 5 mg by mouth every 24 hours. â¢ Semaglutide (OZEMPIC) (1.34 mg/ml) 1 MG/DOSE Solution Pen-injector Inject 1 mg into the skin 1 day a week. 3 mL 3   â¢ baclofen (LIORESAL) 10 MG tablet Take 1 tablet by mouth 2 times daily as needed (for muscle spasm). Patient is to take one capsule at night only for 4-5 days. Then take twice a day if tolerated, if not tolerated should go back to taking once a day qhs only. When taking this medication during the day , take only half first to see how you tolerate the medication.  60 tablet 2   â¢ pantoprazole (PROTONIX) 40 MG tablet Take 1 tablet by mouth 2 times daily. 30 tablet 12   â¢ budesonide-formoterol (SYMBICORT) 160-4.5 MCG/ACT inhaler Inhale 2 puffs into the lungs 2 times daily. 10.2 g 0   â¢ metoCLOPramide (REGLAN) 10 MG tablet Take 1 tablet by mouth 2 times daily. 60 tablet 5   â¢ clotrimazole-betamethasone (LOTRISONE) 1-0.05 % cream Apply topically 2 times daily. (Patient taking differently: Apply topically 2 times daily as needed. ) 30 g 0   â¢ glipiZIDE (GLUCOTROL) 5 MG tablet Take 1 tablet by mouth 2 times daily (before meals). 180 tablet 1   â¢ lisinopril (ZESTRIL) 5 MG tablet Take 1 tablet by mouth daily. 30 tablet 6   â¢ blood glucose (ONE TOUCH ULTRA TEST) test strip Test blood sugar 2 times daily as directed. Diagnosis: E11.65. Meter: One touch ultra mini 200 each 12   â¢ atorvastatin (LIPITOR) 20 MG tablet Take 1 tablet by mouth daily. 30 tablet 11   â¢ Insulin Pen Needle (PEN NEEDLES) 31G X 6 MM Misc To use with LEVEMIR AND HUMALOG FOUR TIMES A  each 3   â¢ pregabalin (LYRICA) 100 MG capsule Take one capsule by mouth twice daily 180 capsule 1   â¢ dicyclomine (BENTYL) 10 MG capsule Take 2 capsules by mouth 4 times daily (before meals and nightly). 30 capsule 0   â¢ fluticasone (FLONASE) 50 MCG/ACT nasal spray Spray 2 sprays in each nostril daily. 16 g 11   â¢ insulin degludec (TRESIBA FLEXTOUCH) 200 UNIT/ML pen-injector Inject 66 Units into the skin daily. 18 mL 3   â¢ metformin (GLUCOPHAGE-XR) 500 MG 24 hr tablet Take 2 tablets by mouth with breakfast and 2 tablets with supper. 120 tablet 6   â¢ betamethasone dipropionate (DIPROSONE) 0.05 % cream Apply cream topically twice daily. (Patient taking differently: 2 times daily as needed. Apply cream topically twice daily.) 45 g 1   â¢ pioglitazone (ACTOS) 30 MG tablet Take 1 tablet by mouth daily.  90 tablet 1   â¢ Blood Glucose Monitoring Suppl (ONE TOUCH ULTRA MINI) w/Device Kit USE AS DIRECTED TO TEST BLOOD GLUCOSE 1 kit 0   â¢ ONETOUCH DELICA LANCETS 21Q Misc Test 4 times daily 300 each 3   â¢ insulin lispro (HUMALOG KWIKPEN) 100 UNIT/ML injection Sliding scale insulin before meals, up to 45 units per dose 60 mL 3   â¢ Insulin Pen Needle (RELION SHORT PEN NEEDLES) 31G X 8 MM Misc To be used with Levemir flexpen. 100 each 3   â¢ MULTIPLE VITAMIN PO Take 1 tablet by mouth daily. No current facility-administered medications for this visit. 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   Tobacco Use   â¢ Smoking status: Former Smoker     Last attempt to quit: 3/4/2008     Years since quitting: 10.9   â¢ Smokeless tobacco: Never Used   Substance and Sexual Activity   â¢ Alcohol use: No     Alcohol/week: 0.0 oz     Comment: Patient quit drinking in 2016   â¢ Drug use: No   â¢ Sexual activity: Yes     Partners: Female   Other Topics Concern   â¢  Service Not Asked   â¢ Blood Transfusions Not Asked   â¢ Caffeine Concern Not Asked   â¢ Occupational Exposure Not Asked   â¢ [place] Hazards Not Asked   â¢ Sleep Concern Not Asked   â¢ Stress Concern Not Asked   â¢ Weight Concern Not Asked   â¢ Special Diet Not Asked   â¢ Back Care Not Asked   â¢ Exercise Not Asked   â¢ Bike Helmet Not Asked   â¢ Seat Belt Yes   â¢ Self-Exams Not Asked   Social History Narrative   â¢ Not on file        Family history:  Family History   Problem Relation Age of Onset   â¢ Diabetes Father    â¢ Hypertension Father    â¢ High cholesterol Father    â¢ Arthritis Father    â¢ High blood pressure Father    â¢ Stroke Father    â¢ "Cancer, Colon Father 79   â¢ Arthritis Mother    â¢ Thyroid Neg Hx    â¢ Cancer, Prostate Neg Hx         Immunization History   Administered Date(s) Administered   â¢ Influenza, injectable, quadrivalent 10/24/2018   â¢ Influenza, seasonal, injectable, trivalent 09/05/2017   â¢ Pneumococcal polysaccharide, adult, 23 valent 08/21/2018   â¢ Tdap 07/02/2015         Objective:        Visit Vitals  /84   Pulse 100   Temp 97 Â°F (36.1 Â°C) (Temporal)   Resp 16   Ht 5' 7"" (1.702 m)   Wt 99.3 kg   BMI 34.30 kg/mÂ²       General: Well Developed, Well Nourished. Nontoxic in appearance  HEENT: , Conjunctiva are pink, Sclera are non icteric. Oropharynx is moist and without exudates or erythema. TM normal bilaterally  Neck: No carotid bruits are noted bilaterally. No thyromegaly or nodules. Cardiovascular: Regular Heart Rate and Rhythm. Normal S1 and S2. No Murmurs, gallops or rubs. No S3 or S4 heard. Lungs:  Respiratory effort is normal. No rales, rhonchi, or Wheezing is noted. No accessory muscle use is noted. Abdomen: Soft, non tender, non distended. No hepatomegaly, No splenomegaly. No rebound. No guarding  Musculoskeletal/extremities:no edema noted to the bilateral lower extremities  Neurologic: Awake, Alert and Oriented to Person, Place, Time and Situation. Cranial Nerves 2-12 intact with exceptions noted. He has right-sided ptosis. Left pupil is dilated and nonreactive. Right pupil reacts somewhat better. This is consistent with recent eye exam  Reflexes 2+ throughout (brachioradialis, patellar, achilles). Skin: Warm, Dry and Intact. No cyanosis or pallor. No clubbing. No rashes. Psychiatric: Normal Mood and Affect. Thought content normal.     Right shoulder without swelling or deformity or tenderness. Positive Jama signs. He is unable to raise his right arm above the horizontal plane    Tenderness to the right ribs noted.   No crepitations    Oxygen saturation 95% on room air    Assessment:    Labs:    Lab " Services on 02/05/2019   Component Date Value Ref Range Status   â¢ COLOR 02/05/2019 YELLOW  YELLOW Final   â¢ APPEARANCE 02/05/2019 HAZY   Final   â¢ GLUCOSE(URINE) 02/05/2019 >500* NEGATIVE mg/dL Final   â¢ BILIRUBIN 02/05/2019 NEGATIVE  NEGATIVE Final   â¢ KETONES 02/05/2019 NEGATIVE  NEGATIVE mg/dL Final   â¢ SPECIFIC GRAVITY 02/05/2019 1.022  1.005 - 1.030 Final   â¢ BLOOD 02/05/2019 SMALL* NEGATIVE Final   â¢ pH 02/05/2019 5.0  5.0 - 7.0 Units Final   â¢ PROTEIN(URINE) 02/05/2019 30* NEGATIVE mg/dL Final   â¢ UROBILINOGEN 02/05/2019 0.2  0.0 - 1.0 mg/dL Final   â¢ NITRITE 02/05/2019 NEGATIVE  NEGATIVE Final   â¢ LEUKOCYTE ESTERASE 02/05/2019 NEGATIVE  NEGATIVE Final   â¢ SPECIMEN TYPE 02/05/2019 URINE, CLEAN CATCH/MIDSTREAM   Final   â¢ Squamous EPI'S 02/05/2019 NONE SEEN  0 - 5 /hpf Final   â¢ RBC 02/05/2019 1 to 3  0 - 3 /hpf Final   â¢ WBC 02/05/2019 1 to 5  0 - 5 /hpf Final   â¢ BACTERIA 02/05/2019 NONE SEEN  NONE SEEN /hpf Final   â¢ Hyaline Casts 02/05/2019 NONE SEEN  0 - 5 /lpf Final   â¢ Fasting Status 02/05/2019 0  hrs Final   â¢ Sodium 02/05/2019 133* 135 - 145 mmol/L Final   â¢ Potassium 02/05/2019 3.9  3.4 - 5.1 mmol/L Final    RESULT CONFIRMED BY REPEAT TESTING   â¢ Chloride 02/05/2019 96* 98 - 107 mmol/L Final   â¢ Carbon Dioxide 02/05/2019 28  21 - 32 mmol/L Final   â¢ Anion Gap 02/05/2019 13  10 - 20 mmol/L Final   â¢ Glucose 02/05/2019 443* 65 - 99 mg/dL Final   â¢ BUN 02/05/2019 17  6 - 20 mg/dL Final   â¢ Creatinine 02/05/2019 1.03  0.67 - 1.17 mg/dL Final   â¢ GFR Estimate,  02/05/2019 >90   Final    eGFR results = or >90 mL/min/1.73m2 = Normal kidney function. â¢ GFR Estimate, Non  02/05/2019 87   Final    eGFR 60 - 89 mL/min/1.73m2 = Mild decrease in kidney function.    â¢ BUN/Creatinine Ratio 02/05/2019 17  7 - 25 Final   â¢ CALCIUM 02/05/2019 9.5  8.4 - 10.2 mg/dL Final   â¢ TOTAL BILIRUBIN 02/05/2019 0.5  0.2 - 1.0 mg/dL Final   â¢ AST/SGOT 02/05/2019 14  <38 Units/L Final   â¢ ALT/SGPT 02/05/2019 34  <79 Units/L Final   â¢ ALK PHOSPHATASE 02/05/2019 80  45 - 117 Units/L Final   â¢ TOTAL PROTEIN 02/05/2019 7.0  6.4 - 8.2 g/dL Final   â¢ Albumin 02/05/2019 3.9  3.6 - 5.1 g/dL Final   â¢ GLOBULIN 02/05/2019 3.1  2.0 - 4.0 g/dL Final   â¢ A/G Ratio, Serum 02/05/2019 1.3  1.0 - 2.4 Final   â¢ WBC 02/05/2019 8.1  4.2 - 11.0 K/mcL Final   â¢ RBC 02/05/2019 5.32  4.50 - 5.90 mil/mcL Final   â¢ HGB 02/05/2019 15.8  13.0 - 17.0 g/dL Final   â¢ HCT 02/05/2019 46.4  39.0 - 51.0 % Final   â¢ MCV 02/05/2019 87.2  78.0 - 100.0 fl Final   â¢ MCH 02/05/2019 29.7  26.0 - 34.0 pg Final   â¢ MCHC 02/05/2019 34.1  32.0 - 36.5 g/dL Final   â¢ RDW-CV 02/05/2019 12.3  11.0 - 15.0 % Final   â¢ PLT 02/05/2019 161  140 - 450 K/mcL Final   â¢ NRBC 02/05/2019 0  0 /100 WBC Final   â¢ DIFF TYPE 02/05/2019 AUTOMATED DIFFERENTIAL   Final   â¢ Neutrophil 02/05/2019 71  % Final   â¢ LYMPH 02/05/2019 16  % Final   â¢ MONO 02/05/2019 8  % Final   â¢ EOSIN 02/05/2019 3  % Final   â¢ BASO 02/05/2019 1  % Final   â¢ Percent Immature Granuloctyes 02/05/2019 1  % Final   â¢ Absolute Neutrophil 02/05/2019 5.7  1.8 - 7.7 K/mcL Final   â¢ Absolute Lymph 02/05/2019 1.3  1.0 - 4.8 K/mcL Final   â¢ Absolute Mono 02/05/2019 0.7  0.3 - 0.9 K/mcL Final   â¢ Absolute Eos 02/05/2019 0.3  0.1 - 0.5 K/mcL Final   â¢ Absolute Baso 02/05/2019 0.1  0.0 - 0.3 K/mcL Final   â¢ Absolute Immature Granulocytes 02/05/2019 0.1  0 - 0.2 K/mcl Final     Sinus tachycardia   Septal infarct   (cited on or before   15-GEORGETTE-2018)   Abnormal ECG   When compared with ECG of   13-JUL-2018 09:31,   No significant change was found   agree   Confirmed by [facility] Eva JEFFERSON ([id number]) on 12/1/2018 5:13:22 PM     EXAM: XR CHEST PA AND LATERAL  Â   CLINICAL HISTORY: Pneumonia symptoms. Â   2 views of the chest are compared to prior study dated 12:40 PM. The heart  size is normal. Right middle lobe infiltrate on the prior study is markedly  decreased in the interval. Minimal residual density is present. Continued  follow-up may be helpful. No pleural effusion or pneumothorax is seen. Â   Â   IMPRESSION: Near complete resolution of right middle lobe infiltrate. Patient Name:  Amie Hubbard  YOB: 1974  DATE OF SERVICE : 12/17/2018  PROCEDURALIST: Adithya Rosales MD  Â   Rest and Stress Myocardial Perfusion with Regadenoson  Â   REASON FOR TEST:  This is a 53year old male being evaluated for chest pain. Â   PROCEDURE:  The patient was injected with 9.3 mCi Tc99m Sestamibi at rest.Â  SPECT myocardial perfusion imaging was performed 60 minutes later. Â  Following a 2-hour delay, the patient was administered 0.4 mg IV Regadenoson over 10 seconds. Â  10 seconds after the Regadenoson infusion, an intravenous injection of 28 mCi Tc99m Sestamibi was made. Â  SPECT imaging was performed 45 minutes later. Â   ELECTROCARDIOGRAPHIC FINDINGS:  The resting and stress electrocardiographic interpretation is reported separately. Â   FINDINGS:  Image quality is fair, diaphragmatic attenuation noted in the scan. [facility] registration correction is performed on both sets of images. Scintigraphic results: Rest and stress left ventricular cavity size were normal in both sets of images. In both rest and stress tomographic images there is homogeneous uptake of the tracer concentration left ventricular myocardium. Â   Gated tomographic images reveal normal myocardial thickening in all segments. Left ventricular ejection fraction is 59%. End-diastolic volume 82 ml. Transient ischemic dilatation (TID) ratio is 1.02.  Â   IMPRESSION:  1. No evidence of regadenosen induced ischemia. 2. No evidence of previous myocardial injury pattern. 3. Normal left ventricular systolic function.   Â   Â   Diagnoses pertaining to today's visit/meds/labs:    Z01.818 Preop examination  (primary encounter diagnosis)  S22.000S Closed compression fracture of thoracic vertebra, sequela  M25.511, M25.512 Acute pain of both shoulders  R07.81 Rib pain on right side  E11.39, E11.65 Type II or unspecified type diabetes mellitus with ophthalmic manifestations, uncontrolled  E11.3313, Z79.4 Type 2 diabetes mellitus with both eyes affected by moderate nonproliferative retinopathy and macular edema, with long-term current use of insulin (CMS/[facility])  I10 Essential hypertension     Orders Placed This Encounter   â¢ XR Shoulder 3 View Right   â¢ XR Ribs Right w PA Chest       __________________________________________      SEE FURTHER DISCUSSION BELOW       Plan:    Preoperative Assessment:  Proceed with surgery as planned   We did  the patient that we could delay surgery to improve control of his diabetes which would lower his overall risk and improve healing. He did not wish to do so understanding this    Cardiac Risk:  The patient has good exercise tolerance as documented above. (>4 mets of Physical Activity). Recent stress testing was normal  Pulmonary Risk:   Patient at increased risk of pulmonary complications due to obesity. Please use and encourage incentive spirometry in the post operative time frame. Please mobilize the patient promptly in the post-operative time frame. Infectious Disease Risk:   The patient is at increased risk of infectious complication due to underlying diabetes. His pneumonia has resolved       Cognitive Risk:   The patient is not at increased rish of cognitive complications and has no baseline cognitive issues. Hematologic Risk:   The patient is at increased risk of thromboembolism due to obesity. Advise usage of TEDS and SCD's for DVT prophylaxis in this patient. Chronic Medical Illness: as noted below    Medications:    Increase Tresiba to 70 units nightly.     Check sugars three times a day before meals    Follow your diabetic diet    Call us or your diabetes provider in 2 weeks with update on sugars      MEDICATION INSTRUCTIONS:    PLEASE TAKE THE FOLLOWING MEDICATION WITH SIPS OF WATER ON THE MORNING OF YOUR PROCEDURE: synbicort    THE REST OF YOUR MEDICATIONS SHOULD ALL BE HELD ON THE MORNING OF YOUR PROCEDURE. 1.  Diabetes-importance of tight control of his diabetes was discussed as well as its impact on her coming surgery. Receive a adjusted as above  2. Hypertension stable control  3. Recent pneumonia-resolved  4. Bronchospasm resolved  5. Status post fall with right shoulder discomfort/right rib discomfort-x-rays ordered. If shoulder continues to bother him after 1-2 weeks he will contact us for referral to sports medicine  6. Obesity-  Body mass index is 34.3 kg/mÂ². Anurag Carver He is noted to have the following weight related comorbidities:   hypertension and diabetes Counseling was provided at this time to work on weight loss. For details, refer to After Visit Summary.    7. Abnormal eye exam with ptosis and unreactive left pupil as noted above and on recent eye examination independent

## 2019-04-03 ENCOUNTER — INPATIENT (INPATIENT)
Facility: HOSPITAL | Age: 64
LOS: 12 days | Discharge: HOME | End: 2019-04-16
Attending: INTERNAL MEDICINE | Admitting: INTERNAL MEDICINE
Payer: COMMERCIAL

## 2019-04-03 VITALS
WEIGHT: 214.95 LBS | HEART RATE: 84 BPM | DIASTOLIC BLOOD PRESSURE: 58 MMHG | HEIGHT: 68 IN | TEMPERATURE: 98 F | RESPIRATION RATE: 16 BRPM | SYSTOLIC BLOOD PRESSURE: 115 MMHG | OXYGEN SATURATION: 100 %

## 2019-04-03 DIAGNOSIS — S32.10XA UNSPECIFIED FRACTURE OF SACRUM, INITIAL ENCOUNTER FOR CLOSED FRACTURE: ICD-10-CM

## 2019-04-03 DIAGNOSIS — I10 ESSENTIAL (PRIMARY) HYPERTENSION: ICD-10-CM

## 2019-04-03 DIAGNOSIS — M54.9 DORSALGIA, UNSPECIFIED: ICD-10-CM

## 2019-04-03 DIAGNOSIS — G47.33 OBSTRUCTIVE SLEEP APNEA (ADULT) (PEDIATRIC): ICD-10-CM

## 2019-04-03 DIAGNOSIS — E66.01 MORBID (SEVERE) OBESITY DUE TO EXCESS CALORIES: ICD-10-CM

## 2019-04-03 DIAGNOSIS — Z98.890 OTHER SPECIFIED POSTPROCEDURAL STATES: Chronic | ICD-10-CM

## 2019-04-03 DIAGNOSIS — Z96.642 PRESENCE OF LEFT ARTIFICIAL HIP JOINT: Chronic | ICD-10-CM

## 2019-04-03 DIAGNOSIS — C50.919 MALIGNANT NEOPLASM OF UNSPECIFIED SITE OF UNSPECIFIED FEMALE BREAST: ICD-10-CM

## 2019-04-03 DIAGNOSIS — D64.9 ANEMIA, UNSPECIFIED: ICD-10-CM

## 2019-04-03 LAB
ALBUMIN SERPL ELPH-MCNC: 2.6 G/DL — LOW (ref 3.3–5)
ALP SERPL-CCNC: 112 U/L — SIGNIFICANT CHANGE UP (ref 40–120)
ALT FLD-CCNC: 13 U/L — SIGNIFICANT CHANGE UP (ref 12–78)
ANION GAP SERPL CALC-SCNC: 8 MMOL/L — SIGNIFICANT CHANGE UP (ref 5–17)
AST SERPL-CCNC: 13 U/L — LOW (ref 15–37)
BASOPHILS # BLD AUTO: 0.01 K/UL — SIGNIFICANT CHANGE UP (ref 0–0.2)
BASOPHILS NFR BLD AUTO: 0.2 % — SIGNIFICANT CHANGE UP (ref 0–2)
BILIRUB SERPL-MCNC: 0.3 MG/DL — SIGNIFICANT CHANGE UP (ref 0.2–1.2)
BUN SERPL-MCNC: 18 MG/DL — SIGNIFICANT CHANGE UP (ref 7–23)
CALCIUM SERPL-MCNC: 9.4 MG/DL — SIGNIFICANT CHANGE UP (ref 8.5–10.1)
CHLORIDE SERPL-SCNC: 101 MMOL/L — SIGNIFICANT CHANGE UP (ref 96–108)
CO2 SERPL-SCNC: 26 MMOL/L — SIGNIFICANT CHANGE UP (ref 22–31)
CREAT SERPL-MCNC: 1.01 MG/DL — SIGNIFICANT CHANGE UP (ref 0.5–1.3)
EOSINOPHIL # BLD AUTO: 0.03 K/UL — SIGNIFICANT CHANGE UP (ref 0–0.5)
EOSINOPHIL NFR BLD AUTO: 0.5 % — SIGNIFICANT CHANGE UP (ref 0–6)
GLUCOSE SERPL-MCNC: 96 MG/DL — SIGNIFICANT CHANGE UP (ref 70–99)
HCT VFR BLD CALC: 31 % — LOW (ref 34.5–45)
HGB BLD-MCNC: 9.7 G/DL — LOW (ref 11.5–15.5)
IMM GRANULOCYTES NFR BLD AUTO: 0.3 % — SIGNIFICANT CHANGE UP (ref 0–1.5)
LYMPHOCYTES # BLD AUTO: 1.51 K/UL — SIGNIFICANT CHANGE UP (ref 1–3.3)
LYMPHOCYTES # BLD AUTO: 24.4 % — SIGNIFICANT CHANGE UP (ref 13–44)
MCHC RBC-ENTMCNC: 27 PG — SIGNIFICANT CHANGE UP (ref 27–34)
MCHC RBC-ENTMCNC: 31.3 GM/DL — LOW (ref 32–36)
MCV RBC AUTO: 86.4 FL — SIGNIFICANT CHANGE UP (ref 80–100)
MONOCYTES # BLD AUTO: 1.03 K/UL — HIGH (ref 0–0.9)
MONOCYTES NFR BLD AUTO: 16.7 % — HIGH (ref 2–14)
NEUTROPHILS # BLD AUTO: 3.58 K/UL — SIGNIFICANT CHANGE UP (ref 1.8–7.4)
NEUTROPHILS NFR BLD AUTO: 57.9 % — SIGNIFICANT CHANGE UP (ref 43–77)
NRBC # BLD: 0 /100 WBCS — SIGNIFICANT CHANGE UP (ref 0–0)
PLATELET # BLD AUTO: 310 K/UL — SIGNIFICANT CHANGE UP (ref 150–400)
POTASSIUM SERPL-MCNC: 3.7 MMOL/L — SIGNIFICANT CHANGE UP (ref 3.5–5.3)
POTASSIUM SERPL-SCNC: 3.7 MMOL/L — SIGNIFICANT CHANGE UP (ref 3.5–5.3)
PROT SERPL-MCNC: 8.2 GM/DL — SIGNIFICANT CHANGE UP (ref 6–8.3)
RBC # BLD: 3.59 M/UL — LOW (ref 3.8–5.2)
RBC # FLD: 14.8 % — HIGH (ref 10.3–14.5)
SODIUM SERPL-SCNC: 135 MMOL/L — SIGNIFICANT CHANGE UP (ref 135–145)
WBC # BLD: 6.18 K/UL — SIGNIFICANT CHANGE UP (ref 3.8–10.5)
WBC # FLD AUTO: 6.18 K/UL — SIGNIFICANT CHANGE UP (ref 3.8–10.5)

## 2019-04-03 PROCEDURE — 99285 EMERGENCY DEPT VISIT HI MDM: CPT | Mod: 25

## 2019-04-03 PROCEDURE — 72192 CT PELVIS W/O DYE: CPT | Mod: 26,59

## 2019-04-03 PROCEDURE — 71260 CT THORAX DX C+: CPT | Mod: 26

## 2019-04-03 PROCEDURE — 74177 CT ABD & PELVIS W/CONTRAST: CPT | Mod: 26

## 2019-04-03 RX ORDER — METHOCARBAMOL 500 MG/1
750 TABLET, FILM COATED ORAL ONCE
Qty: 0 | Refills: 0 | Status: COMPLETED | OUTPATIENT
Start: 2019-04-03 | End: 2019-04-03

## 2019-04-03 RX ORDER — MORPHINE SULFATE 50 MG/1
4 CAPSULE, EXTENDED RELEASE ORAL ONCE
Qty: 0 | Refills: 0 | Status: DISCONTINUED | OUTPATIENT
Start: 2019-04-03 | End: 2019-04-03

## 2019-04-03 RX ORDER — KETOROLAC TROMETHAMINE 30 MG/ML
15 SYRINGE (ML) INJECTION ONCE
Qty: 0 | Refills: 0 | Status: DISCONTINUED | OUTPATIENT
Start: 2019-04-03 | End: 2019-04-03

## 2019-04-03 RX ORDER — MORPHINE SULFATE 50 MG/1
2 CAPSULE, EXTENDED RELEASE ORAL EVERY 4 HOURS
Qty: 0 | Refills: 0 | Status: DISCONTINUED | OUTPATIENT
Start: 2019-04-03 | End: 2019-04-10

## 2019-04-03 RX ORDER — DOCUSATE SODIUM 100 MG
100 CAPSULE ORAL DAILY
Qty: 0 | Refills: 0 | Status: DISCONTINUED | OUTPATIENT
Start: 2019-04-03 | End: 2019-04-12

## 2019-04-03 RX ORDER — OXYCODONE AND ACETAMINOPHEN 5; 325 MG/1; MG/1
1 TABLET ORAL EVERY 6 HOURS
Qty: 0 | Refills: 0 | Status: DISCONTINUED | OUTPATIENT
Start: 2019-04-03 | End: 2019-04-10

## 2019-04-03 RX ORDER — PANTOPRAZOLE SODIUM 20 MG/1
40 TABLET, DELAYED RELEASE ORAL
Qty: 0 | Refills: 0 | Status: DISCONTINUED | OUTPATIENT
Start: 2019-04-03 | End: 2019-04-16

## 2019-04-03 RX ADMIN — MORPHINE SULFATE 4 MILLIGRAM(S): 50 CAPSULE, EXTENDED RELEASE ORAL at 06:51

## 2019-04-03 RX ADMIN — MORPHINE SULFATE 4 MILLIGRAM(S): 50 CAPSULE, EXTENDED RELEASE ORAL at 02:19

## 2019-04-03 RX ADMIN — Medication 15 MILLIGRAM(S): at 02:18

## 2019-04-03 RX ADMIN — MORPHINE SULFATE 2 MILLIGRAM(S): 50 CAPSULE, EXTENDED RELEASE ORAL at 20:34

## 2019-04-03 RX ADMIN — MORPHINE SULFATE 2 MILLIGRAM(S): 50 CAPSULE, EXTENDED RELEASE ORAL at 13:30

## 2019-04-03 RX ADMIN — MORPHINE SULFATE 2 MILLIGRAM(S): 50 CAPSULE, EXTENDED RELEASE ORAL at 21:33

## 2019-04-03 RX ADMIN — MORPHINE SULFATE 4 MILLIGRAM(S): 50 CAPSULE, EXTENDED RELEASE ORAL at 06:28

## 2019-04-03 RX ADMIN — MORPHINE SULFATE 2 MILLIGRAM(S): 50 CAPSULE, EXTENDED RELEASE ORAL at 12:30

## 2019-04-03 RX ADMIN — Medication 100 MILLIGRAM(S): at 12:31

## 2019-04-03 RX ADMIN — METHOCARBAMOL 750 MILLIGRAM(S): 500 TABLET, FILM COATED ORAL at 02:18

## 2019-04-03 NOTE — H&P ADULT - NSICDXPASTSURGICALHX_GEN_ALL_CORE_FT
PAST SURGICAL HISTORY:  H/O total hip arthroplasty, left March 27, 2018    S/P lumpectomy, left breast 2 years ago

## 2019-04-03 NOTE — ED ADULT NURSE NOTE - ED STAT RN HANDOFF DETAILS
Handoff given to PHONG Tolbert. JANE pt general condition remains stable. medicated per MAR family at bedside.

## 2019-04-03 NOTE — CONSULT NOTE ADULT - SUBJECTIVE AND OBJECTIVE BOX
yo F w/ worsening back pain over last week. Denies fall or recent injury. S/p JOHANNA and TKA by Dr. Banda. Denies prior back issues. 62yo F w/ worsening back pain over last 2 days. Denies fall or recent injury. S/p JOHANNA and TKA by Dr. Banda. Denies prior back issues. No treatment thus far    PMH:   HTN  DAMASO  Breast cancer s/p lumpectomy and hormone treatment. no chemo or xrt (4 years ago)    PSH:  Lumpectomy  JOHANNA  TKA    Meds 62yo F w/ worsening back pain over last 2 days radiating to left leg. Denies fall or recent injury. S/p L JOHANNA and R TKA by Dr. Banda. Denies prior back issues. No treatment thus far. denies weakness, numbness, tingling, bowel/bladder incontinence.    PMH:   HTN  DAMASO  Breast cancer s/p lumpectomy and hormone treatment. no chemo or xrt (4 years ago)    PSH:  Lumpectomy  JOHANNA  TKA    Meds:  pantoprazole  tramadol    denies tob    Phys exam:  Patient in hospital bed  no skin breakdown  +ttp at sacrum  healed scars at L hip, R knee    RLE:  no pain on straight leg raise  no pain on axial loading/log roll  no obvious instability  5/5 in all muscles groups  splt all distrib  wwp    LLE  +pain in back on straight leg raise  no pain with axial load/log roll  weakness on hip flexion due to pain  5/5 all other muscle groups  splt all distrib  wwp    CT: IMPRESSION:    1. Osseous metastatic disease.  2. Acute appearing pathologic fracture involving a lytic lesion in the S1   vertebral body with fracture extension to the S1 superior endplate   resulting in mild depression of the endplate.

## 2019-04-03 NOTE — ED PROVIDER NOTE - PHYSICAL EXAMINATION
Gen: Alert, distress on movement, well appearing  Head: NC, AT, PERRL, EOMI, normal lids/conjunctiva  ENT: normal hearing, patent oropharynx without erythema/exudate, uvula midline  Neck: +supple, no tenderness/meningismus/JVD, +Trachea midline  Pulm: Bilateral BS, normal resp effort, no wheeze/stridor/retractions  CV: RRR, no M/R/G, +dist pulses  Abd: soft, NT/ND, +BS, no palpable masses  Mskel: no edema/erythema/cyanosis, +tenderness to palpation of lower back and left hip  Skin: no rash, warm/dry  Neuro: AAOx3, no apparent sensory/motor deficits, coordination intact

## 2019-04-03 NOTE — H&P ADULT - HISTORY OF PRESENT ILLNESS
Patient presents to the ED for left hip pain.  Patient reports pain from left lower back radiating to left hip and groin, occasionally down left leg for the last 2 days.  denies any fall or injury.  Has history of left hip replacement.  Denies any saddle numbness, leg weakness, or changes in bowel or bladder.

## 2019-04-03 NOTE — ED ADULT TRIAGE NOTE - CHIEF COMPLAINT QUOTE
patient reports L- side hip pain radiating to the L- pelvic area. L hip surgery last year. denies falls, abd pain, N/V. Took tylenol extra strength and motrin at 8pm

## 2019-04-03 NOTE — ED PROVIDER NOTE - CLINICAL SUMMARY MEDICAL DECISION MAKING FREE TEXT BOX
Patient with lower back pain, no signs of cord compression. VSS. CT imaging concerning for metastatic lesions which caused her fracture.  Has remote history of breast cancer.  Ortho paged for consult.  Patient still in too much discomfort to discharge.  Patient is to be admitted to the hospital and the case was discussed with the admitting physician.  Any changes in plan, additional imaging/labs, and further work up will be at the discretion of the admitting physician.

## 2019-04-03 NOTE — ED ADULT NURSE NOTE - NSIMPLEMENTINTERV_GEN_ALL_ED
Implemented All Fall with Harm Risk Interventions:  [place] to call system. Call bell, personal items and telephone within reach. Instruct patient to call for assistance. Room bathroom lighting operational. Non-slip footwear when patient is off stretcher. Physically safe environment: no spills, clutter or unnecessary equipment. Stretcher in lowest position, wheels locked, appropriate side rails in place. Provide visual cue, wrist band, yellow gown, etc. Monitor gait and stability. Monitor for mental status changes and reorient to person, place, and time. Review medications for side effects contributing to fall risk. Reinforce activity limits and safety measures with patient and family. Provide visual clues: red socks.

## 2019-04-03 NOTE — H&P ADULT - NSICDXFAMILYHX_GEN_ALL_CORE_FT
FAMILY HISTORY:  Mother  Still living? No  Family history of sepsis, Age at diagnosis: Age Unknown    Sibling  Still living? No  Family history of breast cancer in sister, Age at diagnosis: Age Unknown  Family history of multiple myeloma, Age at diagnosis: Age Unknown

## 2019-04-03 NOTE — CONSULT NOTE ADULT - SUBJECTIVE AND OBJECTIVE BOX
HPI:  Patient presents to the ED for left hip pain.  Patient reports pain from left lower back radiating to left hip and groin, occasionally down left leg for the last 2 days.  denies any fall or injury.  Has history of left hip replacement.  Denies any saddle numbness, leg weakness, or changes in bowel or bladder. (03 Apr 2019 18:36)      PAST MEDICAL & SURGICAL HISTORY:  Breast cancer: s/p lumpectomy  DAMASO (obstructive sleep apnea): worse in past has had  100  pound weight loss ; refuses CPAP  Morbid obesity: BMI 46  HTN (hypertension)  H/O total hip arthroplasty, left: March 27, 2018  S/P lumpectomy, left breast: 2 years ago      REVIEW OF SYSTEMS: pain of L hip inability to move the L lower leg     CONSTITUTIONAL: No weakness, fevers or chills  EYES/ENT:   NECK: No pain or stiffness  RESPIRATORY: No cough, wheezing, hemoptysis; No shortness of breath  CARDIOVASCULAR: No chest pain or palpitations  GASTROINTESTINAL: No abdominal or epigastric pain. No nausea, vomiting, or hematemesis; No diarrhea or constipation. No melena or hematochezia.  GENITOURINARY: No dysuria, frequency or hematuria  NEUROLOGICAL: No numbness or weakness      MEDICATIONS  (STANDING):  docusate sodium 100 milliGRAM(s) Oral daily  pantoprazole    Tablet 40 milliGRAM(s) Oral before breakfast    MEDICATIONS  (PRN):  morphine  - Injectable 2 milliGRAM(s) IV Push every 4 hours PRN Severe Pain (7 - 10)  oxyCODONE    5 mG/acetaminophen 325 mG 1 Tablet(s) Oral every 6 hours PRN Moderate Pain (4 - 6)      Allergies    penicillins (Hives)    Intolerances        SOCIAL HISTORY:    FAMILY HISTORY:  Family history of multiple myeloma (Sibling)  Family history of breast cancer in sister (Sibling)  Family history of sepsis (Mother)      Vital Signs Last 24 Hrs  T(C): 37 (03 Apr 2019 18:07), Max: 37 (03 Apr 2019 18:07)  T(F): 98.6 (03 Apr 2019 18:07), Max: 98.6 (03 Apr 2019 18:07)  HR: 80 (03 Apr 2019 18:07) (80 - 84)  BP: 122/67 (03 Apr 2019 18:07) (106/55 - 126/60)  BP(mean): --  RR: 18 (03 Apr 2019 18:07) (16 - 18)  SpO2: 96% (03 Apr 2019 18:07) (96% - 100%)    PHYSICAL EXAM: obese     GENERAL: NAD, well-developed  HEAD:  Atraumatic, Normocephalic  EYES: pale  conjunctiva and sclera clear  NECK: Supple, No JVD  BREAST: scar of L breast no  CHEST/LUNG: Clear   HEART: Regular rate and rhythm; No murmurs, rubs, or gallops  ABDOMEN: Soft, Nontender, Nondistended; Bowel sounds present  EXTREMITIES:  2+ Peripheral Pulses, No clubbing, cyanosis, or edema tenderness  at the L hip   PSYCH: AAOx3  NEUROLOGY: L lower L leg 2/5      LABS:                        9.7    6.18  )-----------( 310      ( 03 Apr 2019 03:00 )             31.0     04-03    135  |  101  |  18  ----------------------------<  96  3.7   |  26  |  1.01    Ca    9.4      03 Apr 2019 03:42    TPro  8.2  /  Alb  2.6<L>  /  TBili  0.3  /  DBili  x   /  AST  13<L>  /  ALT  13  /  AlkPhos  112  04-03          RADIOLOGY & ADDITIONAL STUDIES:

## 2019-04-03 NOTE — ED PROVIDER NOTE - OBJECTIVE STATEMENT
Pertinent PMH/PSH/FHx/SHx and Review of Systems contained within:  Patient presents to the ED for left hip pain.  Patient reports pain from left lower back radiating to left hip and groin, occasionally down left leg for the last 2 days.  denies any fall or injury.  Has history of left hip replacement.  Denies any saddle numbness, leg weakness, or changes in bowel or bladder.    ROS: No fever/chills, No headache/photophobia/eye pain/changes in vision, No ear pain/sore throat/dysphagia, No chest pain/palpitations, no SOB/cough/wheeze/stridor, No abdominal pain, No N/V/D/melena, no dysuria/frequency/discharge, No neck pain, no rash, no changes in neurological status/function.

## 2019-04-03 NOTE — H&P ADULT - NSHPLABSRESULTS_GEN_ALL_CORE
CBC Full  -  ( 03 Apr 2019 03:00 )  WBC Count : 6.18 K/uL  Hemoglobin : 9.7 g/dL  Hematocrit : 31.0 %  Platelet Count - Automated : 310 K/uL  Mean Cell Volume : 86.4 fl  Mean Cell Hemoglobin : 27.0 pg  Mean Cell Hemoglobin Concentration : 31.3 gm/dL  Auto Neutrophil # : 3.58 K/uL  Auto Lymphocyte # : 1.51 K/uL  Auto Monocyte # : 1.03 K/uL  Auto Eosinophil # : 0.03 K/uL  Auto Basophil # : 0.01 K/uL  Auto Neutrophil % : 57.9 %  Auto Lymphocyte % : 24.4 %  Auto Monocyte % : 16.7 %  Auto Eosinophil % : 0.5 %  Auto Basophil % : 0.2 %  03 Apr 2019 03:42    135    |  101    |  18     ----------------------------<  96     3.7     |  26     |  1.01     Ca    9.4        03 Apr 2019 03:42    TPro  8.2    /  Alb  2.6    /  TBili  0.3    /  DBili  x      /  AST  13     /  ALT  13     /  AlkPhos  112    03 Apr 2019 03:42  < from: CT Chest w/ IV Cont (04.03.19 @ 15:58) >    IMPRESSION:     Scattered sclerotic and lytic lesions throughout the osseous structures   compatible with metastatic disease.    Intrathoracic stomach.    Additional incidental findings as above    < end of copied text >

## 2019-04-03 NOTE — H&P ADULT - NSICDXPASTMEDICALHX_GEN_ALL_CORE_FT
PAST MEDICAL HISTORY:  Breast cancer s/p lumpectomy    HTN (hypertension)     Morbid obesity BMI 46    DAMASO (obstructive sleep apnea) worse in past has had  100  pound weight loss ; refuses CPAP

## 2019-04-04 DIAGNOSIS — C79.51 SECONDARY MALIGNANT NEOPLASM OF BONE: ICD-10-CM

## 2019-04-04 LAB
% ALBUMIN: 39.4 % — SIGNIFICANT CHANGE UP
% ALPHA 1: 8.3 % — SIGNIFICANT CHANGE UP
% ALPHA 2: 21.7 % — SIGNIFICANT CHANGE UP
% BETA: 12.1 % — SIGNIFICANT CHANGE UP
% GAMMA: 18.5 % — SIGNIFICANT CHANGE UP
ALBUMIN SERPL ELPH-MCNC: 2.7 G/DL — LOW (ref 3.6–5.5)
ALBUMIN/GLOB SERPL ELPH: 0.7 RATIO — SIGNIFICANT CHANGE UP
ALPHA1 GLOB SERPL ELPH-MCNC: 0.6 G/DL — HIGH (ref 0.1–0.4)
ALPHA2 GLOB SERPL ELPH-MCNC: 1.5 G/DL — HIGH (ref 0.5–1)
APTT BLD: 37.9 SEC — HIGH (ref 28.5–37)
B-GLOBULIN SERPL ELPH-MCNC: 0.8 G/DL — SIGNIFICANT CHANGE UP (ref 0.5–1)
B2 MICROGLOB SERPL-MCNC: 2.2 MG/L — SIGNIFICANT CHANGE UP (ref 0.8–2.2)
FERRITIN SERPL-MCNC: 509 NG/ML — HIGH (ref 15–150)
FOLATE SERPL-MCNC: 6.3 NG/ML — SIGNIFICANT CHANGE UP
GAMMA GLOBULIN: 1.3 G/DL — SIGNIFICANT CHANGE UP (ref 0.6–1.6)
HAPTOGLOB SERPL-MCNC: 579 MG/DL — HIGH (ref 34–200)
HCT VFR BLD CALC: 30.9 % — LOW (ref 34.5–45)
HCV AB S/CO SERPL IA: 0.08 S/CO — SIGNIFICANT CHANGE UP (ref 0–0.79)
HCV AB SERPL-IMP: SIGNIFICANT CHANGE UP
HGB BLD-MCNC: 9.8 G/DL — LOW (ref 11.5–15.5)
INR BLD: 1.38 RATIO — HIGH (ref 0.88–1.16)
INTERPRETATION SERPL IFE-IMP: SIGNIFICANT CHANGE UP
IRON SATN MFR SERPL: 12 % — LOW (ref 14–50)
IRON SATN MFR SERPL: 23 UG/DL — LOW (ref 30–160)
MCHC RBC-ENTMCNC: 27.1 PG — SIGNIFICANT CHANGE UP (ref 27–34)
MCHC RBC-ENTMCNC: 31.7 GM/DL — LOW (ref 32–36)
MCV RBC AUTO: 85.6 FL — SIGNIFICANT CHANGE UP (ref 80–100)
NRBC # BLD: 0 /100 WBCS — SIGNIFICANT CHANGE UP (ref 0–0)
PLATELET # BLD AUTO: 300 K/UL — SIGNIFICANT CHANGE UP (ref 150–400)
PROT PATTERN SERPL ELPH-IMP: SIGNIFICANT CHANGE UP
PROT SERPL-MCNC: 6.8 G/DL — SIGNIFICANT CHANGE UP (ref 6–8.3)
PROT SERPL-MCNC: 6.8 G/DL — SIGNIFICANT CHANGE UP (ref 6–8.3)
PROTHROM AB SERPL-ACNC: 15.6 SEC — HIGH (ref 10–12.9)
RBC # BLD: 3.61 M/UL — LOW (ref 3.8–5.2)
RBC # FLD: 15 % — HIGH (ref 10.3–14.5)
TIBC SERPL-MCNC: 198 UG/DL — LOW (ref 220–430)
TSH SERPL-MCNC: 1.48 UIU/ML — SIGNIFICANT CHANGE UP (ref 0.36–3.74)
UIBC SERPL-MCNC: 175 UG/DL — SIGNIFICANT CHANGE UP (ref 110–370)
VIT B12 SERPL-MCNC: 298 PG/ML — SIGNIFICANT CHANGE UP (ref 232–1245)
WBC # BLD: 5.3 K/UL — SIGNIFICANT CHANGE UP (ref 3.8–10.5)
WBC # FLD AUTO: 5.3 K/UL — SIGNIFICANT CHANGE UP (ref 3.8–10.5)

## 2019-04-04 PROCEDURE — 99221 1ST HOSP IP/OBS SF/LOW 40: CPT

## 2019-04-04 PROCEDURE — 76377 3D RENDER W/INTRP POSTPROCES: CPT | Mod: 26

## 2019-04-04 PROCEDURE — 72125 CT NECK SPINE W/O DYE: CPT | Mod: 26,59

## 2019-04-04 RX ADMIN — MORPHINE SULFATE 2 MILLIGRAM(S): 50 CAPSULE, EXTENDED RELEASE ORAL at 21:07

## 2019-04-04 RX ADMIN — MORPHINE SULFATE 2 MILLIGRAM(S): 50 CAPSULE, EXTENDED RELEASE ORAL at 07:56

## 2019-04-04 RX ADMIN — PANTOPRAZOLE SODIUM 40 MILLIGRAM(S): 20 TABLET, DELAYED RELEASE ORAL at 08:18

## 2019-04-04 RX ADMIN — OXYCODONE AND ACETAMINOPHEN 1 TABLET(S): 5; 325 TABLET ORAL at 11:33

## 2019-04-04 RX ADMIN — MORPHINE SULFATE 2 MILLIGRAM(S): 50 CAPSULE, EXTENDED RELEASE ORAL at 04:34

## 2019-04-04 RX ADMIN — MORPHINE SULFATE 2 MILLIGRAM(S): 50 CAPSULE, EXTENDED RELEASE ORAL at 16:36

## 2019-04-04 RX ADMIN — MORPHINE SULFATE 2 MILLIGRAM(S): 50 CAPSULE, EXTENDED RELEASE ORAL at 17:45

## 2019-04-04 RX ADMIN — MORPHINE SULFATE 2 MILLIGRAM(S): 50 CAPSULE, EXTENDED RELEASE ORAL at 09:00

## 2019-04-04 RX ADMIN — Medication 100 MILLIGRAM(S): at 11:33

## 2019-04-04 RX ADMIN — MORPHINE SULFATE 2 MILLIGRAM(S): 50 CAPSULE, EXTENDED RELEASE ORAL at 21:22

## 2019-04-04 RX ADMIN — OXYCODONE AND ACETAMINOPHEN 1 TABLET(S): 5; 325 TABLET ORAL at 12:33

## 2019-04-04 RX ADMIN — MORPHINE SULFATE 2 MILLIGRAM(S): 50 CAPSULE, EXTENDED RELEASE ORAL at 03:56

## 2019-04-04 NOTE — PROGRESS NOTE ADULT - PROBLEM SELECTOR PLAN 1
possible multiple Myeloma SPEP pending   Needs to have a bone marrow aspirate and biopsy with a bone biopsy possible multiple Myeloma SPEP pending   Needs to have a bone marrow aspirate and biopsy with a bone biopsy  MRI of lumbosacral spine

## 2019-04-04 NOTE — CONSULT NOTE ADULT - ASSESSMENT
61 y o b female with history of L lumpectomy 2 years ago with back and L hip pain and diffuse metastatic disease and anemia  never being on hormonotherapy , no chemo , never seen an oncologist ER and [place] , Her2 receptors not known
64 yo female with pmhx of breast cancer s/p lumpectomy (no chemo or rad), 4 years ago, found to have sclerotic/lytic bone lesions, also unintentional weight loss and night sweats.  S/P right TKA and Left JOHANNA, ?pathological  IR consulted for a bone lesion biopsy
Pathologic sacral fracture  -WBAT  -No acute orthopedic intervention at this time  -Oncology eval for source of lesion  -Consider radiation oncology eval for palliative treatment of lesion with XRT  -Analgesia  -Outpatient follow with Dr. Bell or Belén  -Please contact with further questions

## 2019-04-04 NOTE — CONSULT NOTE ADULT - SUBJECTIVE AND OBJECTIVE BOX
-IR consulted for bone lesion biopsy.   61 y o b female with history of L lumpectomy 2 years ago that was proven breast cancer. No chemo or radiation, she was told by the surgeon, the cancer was "fully removed."  Pt states she has not had imaging after that.  Pt c/o left hip pain, found on CT scan to have an S1 vertebral body fx and osseous metastatic disease.  Pt states she had a colonoscopy last year and was WNL and her pap smear was 2 years ago and was WNL.  Also with unintentional weight loss and night sweats  S/P right TKA and Left JOHANNA, ?pathological          PAST MEDICAL & SURGICAL HISTORY:  Breast cancer: s/p lumpectomy  DAMASO (obstructive sleep apnea): worse in past has had  100  pound weight loss ; refuses CPAP  Morbid obesity: BMI 46  HTN (hypertension)  H/O total hip arthroplasty, left: March 27, 2018  S/P lumpectomy, left breast: 2 years ago      Allergies    penicillins (Hives)    Intolerances        MEDICATIONS  (STANDING):  docusate sodium 100 milliGRAM(s) Oral daily  pantoprazole    Tablet 40 milliGRAM(s) Oral before breakfast    MEDICATIONS  (PRN):  morphine  - Injectable 2 milliGRAM(s) IV Push every 4 hours PRN Severe Pain (7 - 10)  oxyCODONE    5 mG/acetaminophen 325 mG 1 Tablet(s) Oral every 6 hours PRN Moderate Pain (4 - 6)        SOCIAL HISTORY:    FAMILY HISTORY:  Family history of multiple myeloma (Sibling)  Family history of breast cancer in sister (Sibling)  Family history of sepsis (Mother)        PHYSICAL EXAM:    Vital Signs Last 24 Hrs  T(C): 37.5 (04 Apr 2019 05:48), Max: 37.8 (03 Apr 2019 23:26)  T(F): 99.5 (04 Apr 2019 05:48), Max: 100.1 (03 Apr 2019 23:26)  HR: 79 (04 Apr 2019 05:48) (79 - 83)  BP: 144/75 (04 Apr 2019 05:48) (122/67 - 144/75)  BP(mean): --  RR: 18 (04 Apr 2019 05:48) (18 - 18)  SpO2: 100% (04 Apr 2019 05:48) (96% - 100%)    General:  Appears stated age, well-groomed, well-nourished, no distress  Lungs:  CTAB  Cardiovascular:  good S1, S2,   Abdomen:  Pain to palpation over left hip; Soft, non-tender, non-distended,   Extremities:  no calf tenderness/swelling b/l  Neuro/Psych:  A &O x 3    LABS:                        9.8    5.30  )-----------( 300      ( 04 Apr 2019 06:32 )             30.9     04-03    135  |  101  |  18  ----------------------------<  96  3.7   |  26  |  1.01    Ca    9.4      03 Apr 2019 03:42    TPro  6.8  /  Alb  x   /  TBili  x   /  DBili  x   /  AST  x   /  ALT  x   /  AlkPhos  x   04-04    PT/INR - ( 04 Apr 2019 06:32 )   PT: 15.6 sec;   INR: 1.38 ratio         PTT - ( 04 Apr 2019 06:32 )  PTT:37.9 sec      RADIOLOGY & ADDITIONAL STUDIES:    < from: CT Chest w/ IV Cont (04.03.19 @ 15:58) >  Scattered sclerotic and lytic lesions throughout the osseous structures   compatible with metastatic disease.    Intrathoracic stomach.    Additional incidental findings as above    < end of copied text >

## 2019-04-05 LAB — INTERPRETATION SERPL IFE-IMP: SIGNIFICANT CHANGE UP

## 2019-04-05 PROCEDURE — 20225 BONE BIOPSY TROCAR/NDL DEEP: CPT

## 2019-04-05 PROCEDURE — 88313 SPECIAL STAINS GROUP 2: CPT | Mod: 26

## 2019-04-05 PROCEDURE — 88305 TISSUE EXAM BY PATHOLOGIST: CPT | Mod: 26,59

## 2019-04-05 PROCEDURE — 72149 MRI LUMBAR SPINE W/DYE: CPT | Mod: 26

## 2019-04-05 PROCEDURE — 85097 BONE MARROW INTERPRETATION: CPT

## 2019-04-05 PROCEDURE — 88173 CYTOPATH EVAL FNA REPORT: CPT | Mod: 26

## 2019-04-05 PROCEDURE — 88360 TUMOR IMMUNOHISTOCHEM/MANUAL: CPT | Mod: 26

## 2019-04-05 PROCEDURE — 77012 CT SCAN FOR NEEDLE BIOPSY: CPT | Mod: 26

## 2019-04-05 PROCEDURE — 88341 IMHCHEM/IMCYTCHM EA ADD ANTB: CPT | Mod: 26,59

## 2019-04-05 PROCEDURE — 88342 IMHCHEM/IMCYTCHM 1ST ANTB: CPT | Mod: 26,59

## 2019-04-05 RX ORDER — DOCUSATE SODIUM 100 MG
100 CAPSULE ORAL THREE TIMES A DAY
Qty: 0 | Refills: 0 | Status: DISCONTINUED | OUTPATIENT
Start: 2019-04-05 | End: 2019-04-16

## 2019-04-05 RX ORDER — SENNA PLUS 8.6 MG/1
2 TABLET ORAL AT BEDTIME
Qty: 0 | Refills: 0 | Status: DISCONTINUED | OUTPATIENT
Start: 2019-04-05 | End: 2019-04-16

## 2019-04-05 RX ORDER — MAGNESIUM HYDROXIDE 400 MG/1
30 TABLET, CHEWABLE ORAL DAILY
Qty: 0 | Refills: 0 | Status: DISCONTINUED | OUTPATIENT
Start: 2019-04-05 | End: 2019-04-16

## 2019-04-05 RX ADMIN — OXYCODONE AND ACETAMINOPHEN 1 TABLET(S): 5; 325 TABLET ORAL at 06:13

## 2019-04-05 RX ADMIN — MORPHINE SULFATE 2 MILLIGRAM(S): 50 CAPSULE, EXTENDED RELEASE ORAL at 16:00

## 2019-04-05 RX ADMIN — MORPHINE SULFATE 2 MILLIGRAM(S): 50 CAPSULE, EXTENDED RELEASE ORAL at 09:07

## 2019-04-05 RX ADMIN — OXYCODONE AND ACETAMINOPHEN 1 TABLET(S): 5; 325 TABLET ORAL at 05:13

## 2019-04-05 RX ADMIN — MORPHINE SULFATE 2 MILLIGRAM(S): 50 CAPSULE, EXTENDED RELEASE ORAL at 15:22

## 2019-04-05 RX ADMIN — MORPHINE SULFATE 2 MILLIGRAM(S): 50 CAPSULE, EXTENDED RELEASE ORAL at 03:33

## 2019-04-05 RX ADMIN — PANTOPRAZOLE SODIUM 40 MILLIGRAM(S): 20 TABLET, DELAYED RELEASE ORAL at 08:59

## 2019-04-05 RX ADMIN — OXYCODONE AND ACETAMINOPHEN 1 TABLET(S): 5; 325 TABLET ORAL at 13:37

## 2019-04-05 RX ADMIN — MORPHINE SULFATE 2 MILLIGRAM(S): 50 CAPSULE, EXTENDED RELEASE ORAL at 10:59

## 2019-04-05 RX ADMIN — MORPHINE SULFATE 2 MILLIGRAM(S): 50 CAPSULE, EXTENDED RELEASE ORAL at 21:03

## 2019-04-05 RX ADMIN — MORPHINE SULFATE 2 MILLIGRAM(S): 50 CAPSULE, EXTENDED RELEASE ORAL at 03:48

## 2019-04-05 RX ADMIN — Medication 100 MILLIGRAM(S): at 13:06

## 2019-04-05 RX ADMIN — OXYCODONE AND ACETAMINOPHEN 1 TABLET(S): 5; 325 TABLET ORAL at 13:05

## 2019-04-05 NOTE — PROGRESS NOTE ADULT - PROBLEM SELECTOR PLAN 1
continue current treatment  IR CONSULT. bone biopsy done  Pain management  Oncology consult appreciated

## 2019-04-05 NOTE — CHART NOTE - NSCHARTNOTEFT_GEN_A_CORE
Vascular & Interventional Radiology Post-Procedure Note    Pre-Procedure Diagnosis: osseous metastasis  Post-Procedure Diagnosis: Same as pre.  Indications for Procedure: malignancy workup    : Yenifer  Assistant(s): n/a    Procedure Details/Findings: CT guided biopsy of left iliac spine mixed sclerotic/lucent lesion. Also performed bone marrow biopsy  Access (if applicable): n/a    Complications: none  Estimated Blood Loss: Minimal  Specimen: 2 cores for surgical pathology. 1 core for bone marrow biopsy. 10cc Aspirate for bone marrow biopsy  Contrast: none  Sedation: Anesthesia  Patient Condition/Disposition: stable, MRI, recovery, then floor    Plan: f/u pathology

## 2019-04-06 LAB
HCT VFR BLD CALC: 28.1 % — LOW (ref 34.5–45)
HGB BLD-MCNC: 8.9 G/DL — LOW (ref 11.5–15.5)
MCHC RBC-ENTMCNC: 26.7 PG — LOW (ref 27–34)
MCHC RBC-ENTMCNC: 31.7 GM/DL — LOW (ref 32–36)
MCV RBC AUTO: 84.4 FL — SIGNIFICANT CHANGE UP (ref 80–100)
NRBC # BLD: 0 /100 WBCS — SIGNIFICANT CHANGE UP (ref 0–0)
PLATELET # BLD AUTO: 260 K/UL — SIGNIFICANT CHANGE UP (ref 150–400)
RBC # BLD: 3.33 M/UL — LOW (ref 3.8–5.2)
RBC # FLD: 14.8 % — HIGH (ref 10.3–14.5)
WBC # BLD: 5.02 K/UL — SIGNIFICANT CHANGE UP (ref 3.8–10.5)
WBC # FLD AUTO: 5.02 K/UL — SIGNIFICANT CHANGE UP (ref 3.8–10.5)

## 2019-04-06 RX ORDER — ENOXAPARIN SODIUM 100 MG/ML
40 INJECTION SUBCUTANEOUS DAILY
Qty: 0 | Refills: 0 | Status: DISCONTINUED | OUTPATIENT
Start: 2019-04-06 | End: 2019-04-16

## 2019-04-06 RX ORDER — ENOXAPARIN SODIUM 100 MG/ML
30 INJECTION SUBCUTANEOUS EVERY 12 HOURS
Qty: 0 | Refills: 0 | Status: DISCONTINUED | OUTPATIENT
Start: 2019-04-06 | End: 2019-04-06

## 2019-04-06 RX ADMIN — OXYCODONE AND ACETAMINOPHEN 1 TABLET(S): 5; 325 TABLET ORAL at 16:18

## 2019-04-06 RX ADMIN — SENNA PLUS 2 TABLET(S): 8.6 TABLET ORAL at 00:12

## 2019-04-06 RX ADMIN — OXYCODONE AND ACETAMINOPHEN 1 TABLET(S): 5; 325 TABLET ORAL at 05:19

## 2019-04-06 RX ADMIN — MORPHINE SULFATE 2 MILLIGRAM(S): 50 CAPSULE, EXTENDED RELEASE ORAL at 13:30

## 2019-04-06 RX ADMIN — OXYCODONE AND ACETAMINOPHEN 1 TABLET(S): 5; 325 TABLET ORAL at 22:21

## 2019-04-06 RX ADMIN — OXYCODONE AND ACETAMINOPHEN 1 TABLET(S): 5; 325 TABLET ORAL at 06:02

## 2019-04-06 RX ADMIN — Medication 100 MILLIGRAM(S): at 13:48

## 2019-04-06 RX ADMIN — MORPHINE SULFATE 2 MILLIGRAM(S): 50 CAPSULE, EXTENDED RELEASE ORAL at 20:40

## 2019-04-06 RX ADMIN — Medication 100 MILLIGRAM(S): at 00:12

## 2019-04-06 RX ADMIN — Medication 100 MILLIGRAM(S): at 21:54

## 2019-04-06 RX ADMIN — MORPHINE SULFATE 2 MILLIGRAM(S): 50 CAPSULE, EXTENDED RELEASE ORAL at 09:00

## 2019-04-06 RX ADMIN — MORPHINE SULFATE 2 MILLIGRAM(S): 50 CAPSULE, EXTENDED RELEASE ORAL at 20:55

## 2019-04-06 RX ADMIN — MORPHINE SULFATE 2 MILLIGRAM(S): 50 CAPSULE, EXTENDED RELEASE ORAL at 12:56

## 2019-04-06 RX ADMIN — MORPHINE SULFATE 2 MILLIGRAM(S): 50 CAPSULE, EXTENDED RELEASE ORAL at 02:52

## 2019-04-06 RX ADMIN — OXYCODONE AND ACETAMINOPHEN 1 TABLET(S): 5; 325 TABLET ORAL at 23:21

## 2019-04-06 RX ADMIN — PANTOPRAZOLE SODIUM 40 MILLIGRAM(S): 20 TABLET, DELAYED RELEASE ORAL at 08:25

## 2019-04-06 RX ADMIN — MORPHINE SULFATE 2 MILLIGRAM(S): 50 CAPSULE, EXTENDED RELEASE ORAL at 08:26

## 2019-04-06 RX ADMIN — SENNA PLUS 2 TABLET(S): 8.6 TABLET ORAL at 21:54

## 2019-04-06 RX ADMIN — OXYCODONE AND ACETAMINOPHEN 1 TABLET(S): 5; 325 TABLET ORAL at 17:00

## 2019-04-06 RX ADMIN — MORPHINE SULFATE 2 MILLIGRAM(S): 50 CAPSULE, EXTENDED RELEASE ORAL at 07:29

## 2019-04-06 RX ADMIN — MORPHINE SULFATE 2 MILLIGRAM(S): 50 CAPSULE, EXTENDED RELEASE ORAL at 03:52

## 2019-04-06 RX ADMIN — Medication 100 MILLIGRAM(S): at 06:12

## 2019-04-07 RX ADMIN — Medication 100 MILLIGRAM(S): at 21:12

## 2019-04-07 RX ADMIN — MORPHINE SULFATE 2 MILLIGRAM(S): 50 CAPSULE, EXTENDED RELEASE ORAL at 21:10

## 2019-04-07 RX ADMIN — MORPHINE SULFATE 2 MILLIGRAM(S): 50 CAPSULE, EXTENDED RELEASE ORAL at 02:46

## 2019-04-07 RX ADMIN — MORPHINE SULFATE 2 MILLIGRAM(S): 50 CAPSULE, EXTENDED RELEASE ORAL at 13:09

## 2019-04-07 RX ADMIN — MORPHINE SULFATE 2 MILLIGRAM(S): 50 CAPSULE, EXTENDED RELEASE ORAL at 02:31

## 2019-04-07 RX ADMIN — OXYCODONE AND ACETAMINOPHEN 1 TABLET(S): 5; 325 TABLET ORAL at 16:23

## 2019-04-07 RX ADMIN — Medication 100 MILLIGRAM(S): at 05:28

## 2019-04-07 RX ADMIN — PANTOPRAZOLE SODIUM 40 MILLIGRAM(S): 20 TABLET, DELAYED RELEASE ORAL at 09:09

## 2019-04-07 RX ADMIN — MORPHINE SULFATE 2 MILLIGRAM(S): 50 CAPSULE, EXTENDED RELEASE ORAL at 21:25

## 2019-04-07 RX ADMIN — ENOXAPARIN SODIUM 40 MILLIGRAM(S): 100 INJECTION SUBCUTANEOUS at 13:08

## 2019-04-07 RX ADMIN — MORPHINE SULFATE 2 MILLIGRAM(S): 50 CAPSULE, EXTENDED RELEASE ORAL at 09:09

## 2019-04-07 RX ADMIN — Medication 100 MILLIGRAM(S): at 13:08

## 2019-04-07 RX ADMIN — SENNA PLUS 2 TABLET(S): 8.6 TABLET ORAL at 21:12

## 2019-04-07 RX ADMIN — OXYCODONE AND ACETAMINOPHEN 1 TABLET(S): 5; 325 TABLET ORAL at 06:28

## 2019-04-07 RX ADMIN — OXYCODONE AND ACETAMINOPHEN 1 TABLET(S): 5; 325 TABLET ORAL at 05:28

## 2019-04-07 RX ADMIN — MORPHINE SULFATE 2 MILLIGRAM(S): 50 CAPSULE, EXTENDED RELEASE ORAL at 10:20

## 2019-04-08 LAB
CANCER AG125 SERPL-ACNC: 9 U/ML — SIGNIFICANT CHANGE UP
CANCER AG19-9 SERPL-ACNC: 15 U/ML — SIGNIFICANT CHANGE UP
CEA SERPL-MCNC: 0.7 NG/ML — SIGNIFICANT CHANGE UP (ref 0–3.8)

## 2019-04-08 RX ADMIN — Medication 100 MILLIGRAM(S): at 05:10

## 2019-04-08 RX ADMIN — OXYCODONE AND ACETAMINOPHEN 1 TABLET(S): 5; 325 TABLET ORAL at 21:00

## 2019-04-08 RX ADMIN — OXYCODONE AND ACETAMINOPHEN 1 TABLET(S): 5; 325 TABLET ORAL at 20:05

## 2019-04-08 RX ADMIN — MORPHINE SULFATE 2 MILLIGRAM(S): 50 CAPSULE, EXTENDED RELEASE ORAL at 16:54

## 2019-04-08 RX ADMIN — MORPHINE SULFATE 2 MILLIGRAM(S): 50 CAPSULE, EXTENDED RELEASE ORAL at 10:13

## 2019-04-08 RX ADMIN — OXYCODONE AND ACETAMINOPHEN 1 TABLET(S): 5; 325 TABLET ORAL at 06:04

## 2019-04-08 RX ADMIN — ENOXAPARIN SODIUM 40 MILLIGRAM(S): 100 INJECTION SUBCUTANEOUS at 12:26

## 2019-04-08 RX ADMIN — OXYCODONE AND ACETAMINOPHEN 1 TABLET(S): 5; 325 TABLET ORAL at 13:46

## 2019-04-08 RX ADMIN — Medication 100 MILLIGRAM(S): at 12:27

## 2019-04-08 RX ADMIN — MORPHINE SULFATE 2 MILLIGRAM(S): 50 CAPSULE, EXTENDED RELEASE ORAL at 02:31

## 2019-04-08 RX ADMIN — PANTOPRAZOLE SODIUM 40 MILLIGRAM(S): 20 TABLET, DELAYED RELEASE ORAL at 08:06

## 2019-04-08 RX ADMIN — MORPHINE SULFATE 2 MILLIGRAM(S): 50 CAPSULE, EXTENDED RELEASE ORAL at 02:46

## 2019-04-08 RX ADMIN — OXYCODONE AND ACETAMINOPHEN 1 TABLET(S): 5; 325 TABLET ORAL at 05:04

## 2019-04-08 RX ADMIN — MORPHINE SULFATE 2 MILLIGRAM(S): 50 CAPSULE, EXTENDED RELEASE ORAL at 17:09

## 2019-04-08 RX ADMIN — MORPHINE SULFATE 2 MILLIGRAM(S): 50 CAPSULE, EXTENDED RELEASE ORAL at 09:58

## 2019-04-08 RX ADMIN — OXYCODONE AND ACETAMINOPHEN 1 TABLET(S): 5; 325 TABLET ORAL at 14:46

## 2019-04-08 NOTE — PHYSICAL THERAPY INITIAL EVALUATION ADULT - IMPAIRMENTS FOUND, PT EVAL
joint integrity and mobility/ROM/ergonomics and body mechanics/posture/sensory integrity/gait, locomotion, and balance

## 2019-04-08 NOTE — PROGRESS NOTE ADULT - PROBLEM SELECTOR PLAN 1
continue current treatment  IR CONSULT. bone biopsy report p  Pain management  Oncology consult appreciated

## 2019-04-08 NOTE — PHYSICAL THERAPY INITIAL EVALUATION ADULT - GAIT TRAINING, PT EVAL
GOAL: Patient will demonstrate independent and safe gait indoors with appropriate mobility/adaptive devices for =/> 300 feet, in 2-3 week.

## 2019-04-08 NOTE — PHYSICAL THERAPY INITIAL EVALUATION ADULT - STRENGTHENING, PT EVAL
GOAL: To demonstrate gross muscle strength of 4/5 or greater to both lower limbs, to enable transfers and gait across all surfaces, elevated and flat, without undue fatigue or falls risk but with good lower limb control and balance in 2-3 weeks.

## 2019-04-08 NOTE — PHYSICAL THERAPY INITIAL EVALUATION ADULT - PERTINENT HX OF CURRENT PROBLEM, REHAB EVAL
Patient came in to [facility]-ED due to increased hip pain. Left hip replacement in this facility on 2018, left breast lumpectomy in 2015. Interventional radiology (bone biopsy and MRI) on 4/5 reveal Acute S1 superior endplate fracture; Acute S1 superior endplate fracture (continued below). Patient came in to [facility]-ED due to increased hip pain. Left hip replacement in this facility on 2018, left breast lumpectomy in 2015. Interventional radiology (bone biopsy and MRI) on 4/5 reveal Acute S1 superior endplate fracture (continued below).

## 2019-04-08 NOTE — PHYSICAL THERAPY INITIAL EVALUATION ADULT - ADDITIONAL COMMENTS
(Continued from above.)  Acute S1 superior endplate fracture extending into the vertebral body. Diffuse osseous metastasis; left anterior superior iliac spine mixed sclerotic and lucent lesion. Ortho consulted: Cervical/Sacral compression fracture, WBAT-spinal precautions.    Per patient, lives c adult children and  in private house c 5 stair steps to enter c bilateral handrails, far apart. Has another flight of stairs to 2nd floor bedroom but contemplating on setting up at main level of house. Owns a cane, rolling walker and 3:1 commode from previous orthopedic surgeries. Denies falls. Reports stairs and sitting on hard surfaces increases pain.

## 2019-04-08 NOTE — PHYSICAL THERAPY INITIAL EVALUATION ADULT - BALANCE TRAINING, PT EVAL
GOAL: Patient will demonstrate independent performance of ADLS c low falls risk with appropriate mobility/adaptive devices, with adherence to orthopedic precautions, in 2-3 weeks.

## 2019-04-08 NOTE — PHYSICAL THERAPY INITIAL EVALUATION ADULT - CRITERIA FOR SKILLED THERAPEUTIC INTERVENTIONS
anticipated equipment needs at discharge/risk reduction/prevention/anticipated discharge recommendation/rehab potential/impairments found/functional limitations in following categories

## 2019-04-09 RX ADMIN — MORPHINE SULFATE 2 MILLIGRAM(S): 50 CAPSULE, EXTENDED RELEASE ORAL at 22:38

## 2019-04-09 RX ADMIN — MORPHINE SULFATE 2 MILLIGRAM(S): 50 CAPSULE, EXTENDED RELEASE ORAL at 00:26

## 2019-04-09 RX ADMIN — PANTOPRAZOLE SODIUM 40 MILLIGRAM(S): 20 TABLET, DELAYED RELEASE ORAL at 07:45

## 2019-04-09 RX ADMIN — OXYCODONE AND ACETAMINOPHEN 1 TABLET(S): 5; 325 TABLET ORAL at 20:42

## 2019-04-09 RX ADMIN — Medication 100 MILLIGRAM(S): at 05:23

## 2019-04-09 RX ADMIN — OXYCODONE AND ACETAMINOPHEN 1 TABLET(S): 5; 325 TABLET ORAL at 19:42

## 2019-04-09 RX ADMIN — MORPHINE SULFATE 2 MILLIGRAM(S): 50 CAPSULE, EXTENDED RELEASE ORAL at 12:43

## 2019-04-09 RX ADMIN — Medication 100 MILLIGRAM(S): at 13:39

## 2019-04-09 RX ADMIN — MORPHINE SULFATE 2 MILLIGRAM(S): 50 CAPSULE, EXTENDED RELEASE ORAL at 22:23

## 2019-04-09 RX ADMIN — ENOXAPARIN SODIUM 40 MILLIGRAM(S): 100 INJECTION SUBCUTANEOUS at 11:41

## 2019-04-09 RX ADMIN — MORPHINE SULFATE 2 MILLIGRAM(S): 50 CAPSULE, EXTENDED RELEASE ORAL at 00:40

## 2019-04-09 RX ADMIN — MORPHINE SULFATE 2 MILLIGRAM(S): 50 CAPSULE, EXTENDED RELEASE ORAL at 05:44

## 2019-04-09 RX ADMIN — MORPHINE SULFATE 2 MILLIGRAM(S): 50 CAPSULE, EXTENDED RELEASE ORAL at 05:29

## 2019-04-09 RX ADMIN — MORPHINE SULFATE 2 MILLIGRAM(S): 50 CAPSULE, EXTENDED RELEASE ORAL at 11:41

## 2019-04-09 RX ADMIN — SENNA PLUS 2 TABLET(S): 8.6 TABLET ORAL at 21:20

## 2019-04-09 RX ADMIN — OXYCODONE AND ACETAMINOPHEN 1 TABLET(S): 5; 325 TABLET ORAL at 04:50

## 2019-04-09 RX ADMIN — Medication 100 MILLIGRAM(S): at 21:20

## 2019-04-09 RX ADMIN — OXYCODONE AND ACETAMINOPHEN 1 TABLET(S): 5; 325 TABLET ORAL at 14:40

## 2019-04-09 RX ADMIN — OXYCODONE AND ACETAMINOPHEN 1 TABLET(S): 5; 325 TABLET ORAL at 13:40

## 2019-04-09 RX ADMIN — OXYCODONE AND ACETAMINOPHEN 1 TABLET(S): 5; 325 TABLET ORAL at 03:52

## 2019-04-09 NOTE — PROGRESS NOTE ADULT - PROBLEM SELECTOR PLAN 1
continue current treatment  IR CONSULT. bone biopsy report p  Pain management  Oncology consult appreciated  Ortho notes appreciated.  For MRI of cspine and thoracic spine under anesthesia

## 2019-04-10 LAB
B2 MICROGLOB SERPL-MCNC: 2.9 MG/L — HIGH (ref 0.8–2.2)
HCT VFR BLD CALC: 28.2 % — LOW (ref 34.5–45)
HGB BLD-MCNC: 8.8 G/DL — LOW (ref 11.5–15.5)
INTERPRETATION SERPL IFE-IMP: SIGNIFICANT CHANGE UP
LDH SERPL L TO P-CCNC: 220 U/L — SIGNIFICANT CHANGE UP (ref 50–242)
MCHC RBC-ENTMCNC: 27 PG — SIGNIFICANT CHANGE UP (ref 27–34)
MCHC RBC-ENTMCNC: 31.2 GM/DL — LOW (ref 32–36)
MCV RBC AUTO: 86.5 FL — SIGNIFICANT CHANGE UP (ref 80–100)
NRBC # BLD: 0 /100 WBCS — SIGNIFICANT CHANGE UP (ref 0–0)
PLATELET # BLD AUTO: 247 K/UL — SIGNIFICANT CHANGE UP (ref 150–400)
RBC # BLD: 3.26 M/UL — LOW (ref 3.8–5.2)
RBC # FLD: 15.2 % — HIGH (ref 10.3–14.5)
TM INTERPRETATION: SIGNIFICANT CHANGE UP
WBC # BLD: 4.48 K/UL — SIGNIFICANT CHANGE UP (ref 3.8–10.5)
WBC # FLD AUTO: 4.48 K/UL — SIGNIFICANT CHANGE UP (ref 3.8–10.5)

## 2019-04-10 RX ORDER — OXYCODONE AND ACETAMINOPHEN 5; 325 MG/1; MG/1
2 TABLET ORAL EVERY 4 HOURS
Qty: 0 | Refills: 0 | Status: DISCONTINUED | OUTPATIENT
Start: 2019-04-10 | End: 2019-04-16

## 2019-04-10 RX ORDER — ACETAMINOPHEN 500 MG
1000 TABLET ORAL ONCE
Qty: 0 | Refills: 0 | Status: COMPLETED | OUTPATIENT
Start: 2019-04-10 | End: 2019-04-10

## 2019-04-10 RX ADMIN — MORPHINE SULFATE 2 MILLIGRAM(S): 50 CAPSULE, EXTENDED RELEASE ORAL at 22:32

## 2019-04-10 RX ADMIN — OXYCODONE AND ACETAMINOPHEN 1 TABLET(S): 5; 325 TABLET ORAL at 12:44

## 2019-04-10 RX ADMIN — MORPHINE SULFATE 2 MILLIGRAM(S): 50 CAPSULE, EXTENDED RELEASE ORAL at 09:10

## 2019-04-10 RX ADMIN — MORPHINE SULFATE 2 MILLIGRAM(S): 50 CAPSULE, EXTENDED RELEASE ORAL at 02:37

## 2019-04-10 RX ADMIN — MORPHINE SULFATE 2 MILLIGRAM(S): 50 CAPSULE, EXTENDED RELEASE ORAL at 22:47

## 2019-04-10 RX ADMIN — ENOXAPARIN SODIUM 40 MILLIGRAM(S): 100 INJECTION SUBCUTANEOUS at 12:41

## 2019-04-10 RX ADMIN — PANTOPRAZOLE SODIUM 40 MILLIGRAM(S): 20 TABLET, DELAYED RELEASE ORAL at 08:41

## 2019-04-10 RX ADMIN — Medication 100 MILLIGRAM(S): at 22:31

## 2019-04-10 RX ADMIN — Medication 100 MILLIGRAM(S): at 13:23

## 2019-04-10 RX ADMIN — MORPHINE SULFATE 2 MILLIGRAM(S): 50 CAPSULE, EXTENDED RELEASE ORAL at 12:39

## 2019-04-10 RX ADMIN — Medication 100 MILLIGRAM(S): at 12:41

## 2019-04-10 RX ADMIN — Medication 400 MILLIGRAM(S): at 05:23

## 2019-04-10 RX ADMIN — MORPHINE SULFATE 2 MILLIGRAM(S): 50 CAPSULE, EXTENDED RELEASE ORAL at 19:39

## 2019-04-10 RX ADMIN — MORPHINE SULFATE 2 MILLIGRAM(S): 50 CAPSULE, EXTENDED RELEASE ORAL at 02:52

## 2019-04-10 RX ADMIN — Medication 1000 MILLIGRAM(S): at 06:49

## 2019-04-10 RX ADMIN — SENNA PLUS 2 TABLET(S): 8.6 TABLET ORAL at 22:31

## 2019-04-10 RX ADMIN — OXYCODONE AND ACETAMINOPHEN 1 TABLET(S): 5; 325 TABLET ORAL at 19:35

## 2019-04-10 RX ADMIN — OXYCODONE AND ACETAMINOPHEN 1 TABLET(S): 5; 325 TABLET ORAL at 20:35

## 2019-04-10 RX ADMIN — MORPHINE SULFATE 2 MILLIGRAM(S): 50 CAPSULE, EXTENDED RELEASE ORAL at 18:11

## 2019-04-10 RX ADMIN — OXYCODONE AND ACETAMINOPHEN 1 TABLET(S): 5; 325 TABLET ORAL at 14:00

## 2019-04-10 NOTE — DIETITIAN INITIAL EVALUATION ADULT. - PERTINENT LABORATORY DATA
04-03 Na 135   Glu 96   K+ 3.7   Cr 1.01   BUN 18   Phos n/a   Alb 2.7(4/4)   PAB n/a   Hgb 8.8 g/dL<L> Hct 28.2 %<L> HgA1C n/a     24Hr FS:

## 2019-04-10 NOTE — DIETITIAN INITIAL EVALUATION ADULT. - PERTINENT MEDS FT
docusate sodium  docusate sodium  enoxaparin Injectable  magnesium hydroxide Suspension PRN  morphine  - Injectable PRN  oxyCODONE    5 mG/acetaminophen 325 mG PRN  pantoprazole    Tablet  senna

## 2019-04-10 NOTE — PROGRESS NOTE ADULT - PROBLEM SELECTOR PLAN 1
continue current treatment  IR CONSULT. bone biopsy report p  Adjust Pain management  Oncology consult appreciated: bone marrow report available; bone biopsy report p  Ortho notes appreciated.  For MRI of cspine and thoracic spine under anesthesia

## 2019-04-10 NOTE — DIETITIAN INITIAL EVALUATION ADULT. - OTHER INFO
Pt seen for LOS. Pt lives with  & daughter; pt's daughter does cooking & food shopping. Pt reports back pain & Lt hip pain with osseeous metastasis. Pt NPO today pending MRI of cervical spine & thoracic spine with anesthesia. Pt reports last BM x 1(4/10). No GI distress.  Pt consuming 75% meals & family brings food daily.

## 2019-04-10 NOTE — DIETITIAN INITIAL EVALUATION ADULT. - ORAL INTAKE PTA
good/3 meals daily; Breakfast; Toast, eggs, oatmeal, Juice Lunch; Fruit, left over food from dinner or s/w Supper; vegetable, meat, chicken or fish

## 2019-04-11 PROCEDURE — 76536 US EXAM OF HEAD AND NECK: CPT | Mod: 26

## 2019-04-11 RX ADMIN — SENNA PLUS 2 TABLET(S): 8.6 TABLET ORAL at 22:05

## 2019-04-11 RX ADMIN — OXYCODONE AND ACETAMINOPHEN 2 TABLET(S): 5; 325 TABLET ORAL at 20:00

## 2019-04-11 RX ADMIN — OXYCODONE AND ACETAMINOPHEN 2 TABLET(S): 5; 325 TABLET ORAL at 15:23

## 2019-04-11 RX ADMIN — Medication 100 MILLIGRAM(S): at 22:05

## 2019-04-11 RX ADMIN — Medication 100 MILLIGRAM(S): at 12:12

## 2019-04-11 RX ADMIN — OXYCODONE AND ACETAMINOPHEN 2 TABLET(S): 5; 325 TABLET ORAL at 01:18

## 2019-04-11 RX ADMIN — OXYCODONE AND ACETAMINOPHEN 2 TABLET(S): 5; 325 TABLET ORAL at 14:23

## 2019-04-11 RX ADMIN — OXYCODONE AND ACETAMINOPHEN 2 TABLET(S): 5; 325 TABLET ORAL at 23:22

## 2019-04-11 RX ADMIN — PANTOPRAZOLE SODIUM 40 MILLIGRAM(S): 20 TABLET, DELAYED RELEASE ORAL at 08:14

## 2019-04-11 RX ADMIN — OXYCODONE AND ACETAMINOPHEN 2 TABLET(S): 5; 325 TABLET ORAL at 10:30

## 2019-04-11 RX ADMIN — Medication 100 MILLIGRAM(S): at 05:24

## 2019-04-11 RX ADMIN — ENOXAPARIN SODIUM 40 MILLIGRAM(S): 100 INJECTION SUBCUTANEOUS at 12:12

## 2019-04-11 RX ADMIN — OXYCODONE AND ACETAMINOPHEN 2 TABLET(S): 5; 325 TABLET ORAL at 19:17

## 2019-04-11 RX ADMIN — OXYCODONE AND ACETAMINOPHEN 2 TABLET(S): 5; 325 TABLET ORAL at 09:31

## 2019-04-11 RX ADMIN — OXYCODONE AND ACETAMINOPHEN 2 TABLET(S): 5; 325 TABLET ORAL at 02:00

## 2019-04-11 NOTE — PROGRESS NOTE ADULT - PROBLEM SELECTOR PLAN 1
continue current treatment  IR CONSULT. bone biopsy report  no   Adjust Pain meds for optimal pain control  Oncology consult appreciated: bone marrow report available; bone biopsy report p  Ortho notes appreciated.  For MRI of cspine and thoracic spine aborted.

## 2019-04-12 DIAGNOSIS — E04.1 NONTOXIC SINGLE THYROID NODULE: ICD-10-CM

## 2019-04-12 LAB
ANION GAP SERPL CALC-SCNC: 9 MMOL/L — SIGNIFICANT CHANGE UP (ref 5–17)
BUN SERPL-MCNC: 8 MG/DL — SIGNIFICANT CHANGE UP (ref 7–23)
CALCIUM SERPL-MCNC: 9.3 MG/DL — SIGNIFICANT CHANGE UP (ref 8.5–10.1)
CHLORIDE SERPL-SCNC: 102 MMOL/L — SIGNIFICANT CHANGE UP (ref 96–108)
CO2 SERPL-SCNC: 27 MMOL/L — SIGNIFICANT CHANGE UP (ref 22–31)
CREAT SERPL-MCNC: 0.72 MG/DL — SIGNIFICANT CHANGE UP (ref 0.5–1.3)
GLUCOSE SERPL-MCNC: 105 MG/DL — HIGH (ref 70–99)
HCT VFR BLD CALC: 31.8 % — LOW (ref 34.5–45)
HGB BLD-MCNC: 9.9 G/DL — LOW (ref 11.5–15.5)
MCHC RBC-ENTMCNC: 27.2 PG — SIGNIFICANT CHANGE UP (ref 27–34)
MCHC RBC-ENTMCNC: 31.1 GM/DL — LOW (ref 32–36)
MCV RBC AUTO: 87.4 FL — SIGNIFICANT CHANGE UP (ref 80–100)
NRBC # BLD: 0 /100 WBCS — SIGNIFICANT CHANGE UP (ref 0–0)
PLATELET # BLD AUTO: 338 K/UL — SIGNIFICANT CHANGE UP (ref 150–400)
POTASSIUM SERPL-MCNC: 3.5 MMOL/L — SIGNIFICANT CHANGE UP (ref 3.5–5.3)
POTASSIUM SERPL-SCNC: 3.5 MMOL/L — SIGNIFICANT CHANGE UP (ref 3.5–5.3)
RBC # BLD: 3.64 M/UL — LOW (ref 3.8–5.2)
RBC # FLD: 15.3 % — HIGH (ref 10.3–14.5)
SODIUM SERPL-SCNC: 138 MMOL/L — SIGNIFICANT CHANGE UP (ref 135–145)
WBC # BLD: 5.26 K/UL — SIGNIFICANT CHANGE UP (ref 3.8–10.5)
WBC # FLD AUTO: 5.26 K/UL — SIGNIFICANT CHANGE UP (ref 3.8–10.5)

## 2019-04-12 RX ADMIN — Medication 100 MILLIGRAM(S): at 06:15

## 2019-04-12 RX ADMIN — OXYCODONE AND ACETAMINOPHEN 2 TABLET(S): 5; 325 TABLET ORAL at 15:00

## 2019-04-12 RX ADMIN — PANTOPRAZOLE SODIUM 40 MILLIGRAM(S): 20 TABLET, DELAYED RELEASE ORAL at 06:15

## 2019-04-12 RX ADMIN — OXYCODONE AND ACETAMINOPHEN 2 TABLET(S): 5; 325 TABLET ORAL at 03:28

## 2019-04-12 RX ADMIN — SENNA PLUS 2 TABLET(S): 8.6 TABLET ORAL at 22:11

## 2019-04-12 RX ADMIN — OXYCODONE AND ACETAMINOPHEN 2 TABLET(S): 5; 325 TABLET ORAL at 21:00

## 2019-04-12 RX ADMIN — OXYCODONE AND ACETAMINOPHEN 2 TABLET(S): 5; 325 TABLET ORAL at 04:28

## 2019-04-12 RX ADMIN — Medication 100 MILLIGRAM(S): at 13:35

## 2019-04-12 RX ADMIN — Medication 100 MILLIGRAM(S): at 22:10

## 2019-04-12 RX ADMIN — OXYCODONE AND ACETAMINOPHEN 2 TABLET(S): 5; 325 TABLET ORAL at 20:08

## 2019-04-12 RX ADMIN — ENOXAPARIN SODIUM 40 MILLIGRAM(S): 100 INJECTION SUBCUTANEOUS at 11:48

## 2019-04-12 RX ADMIN — OXYCODONE AND ACETAMINOPHEN 2 TABLET(S): 5; 325 TABLET ORAL at 00:30

## 2019-04-12 RX ADMIN — OXYCODONE AND ACETAMINOPHEN 2 TABLET(S): 5; 325 TABLET ORAL at 13:32

## 2019-04-12 RX ADMIN — OXYCODONE AND ACETAMINOPHEN 2 TABLET(S): 5; 325 TABLET ORAL at 07:53

## 2019-04-12 RX ADMIN — OXYCODONE AND ACETAMINOPHEN 2 TABLET(S): 5; 325 TABLET ORAL at 10:00

## 2019-04-13 RX ADMIN — Medication 100 MILLIGRAM(S): at 05:50

## 2019-04-13 RX ADMIN — OXYCODONE AND ACETAMINOPHEN 2 TABLET(S): 5; 325 TABLET ORAL at 15:46

## 2019-04-13 RX ADMIN — OXYCODONE AND ACETAMINOPHEN 2 TABLET(S): 5; 325 TABLET ORAL at 01:30

## 2019-04-13 RX ADMIN — OXYCODONE AND ACETAMINOPHEN 2 TABLET(S): 5; 325 TABLET ORAL at 23:43

## 2019-04-13 RX ADMIN — OXYCODONE AND ACETAMINOPHEN 2 TABLET(S): 5; 325 TABLET ORAL at 22:54

## 2019-04-13 RX ADMIN — Medication 100 MILLIGRAM(S): at 21:59

## 2019-04-13 RX ADMIN — Medication 100 MILLIGRAM(S): at 14:11

## 2019-04-13 RX ADMIN — SENNA PLUS 2 TABLET(S): 8.6 TABLET ORAL at 21:59

## 2019-04-13 RX ADMIN — OXYCODONE AND ACETAMINOPHEN 2 TABLET(S): 5; 325 TABLET ORAL at 14:46

## 2019-04-13 RX ADMIN — OXYCODONE AND ACETAMINOPHEN 2 TABLET(S): 5; 325 TABLET ORAL at 06:47

## 2019-04-13 RX ADMIN — OXYCODONE AND ACETAMINOPHEN 2 TABLET(S): 5; 325 TABLET ORAL at 05:50

## 2019-04-13 RX ADMIN — OXYCODONE AND ACETAMINOPHEN 2 TABLET(S): 5; 325 TABLET ORAL at 10:04

## 2019-04-13 RX ADMIN — OXYCODONE AND ACETAMINOPHEN 2 TABLET(S): 5; 325 TABLET ORAL at 00:30

## 2019-04-13 RX ADMIN — OXYCODONE AND ACETAMINOPHEN 2 TABLET(S): 5; 325 TABLET ORAL at 20:00

## 2019-04-13 RX ADMIN — OXYCODONE AND ACETAMINOPHEN 2 TABLET(S): 5; 325 TABLET ORAL at 11:04

## 2019-04-13 RX ADMIN — PANTOPRAZOLE SODIUM 40 MILLIGRAM(S): 20 TABLET, DELAYED RELEASE ORAL at 09:05

## 2019-04-13 RX ADMIN — ENOXAPARIN SODIUM 40 MILLIGRAM(S): 100 INJECTION SUBCUTANEOUS at 11:35

## 2019-04-13 RX ADMIN — OXYCODONE AND ACETAMINOPHEN 2 TABLET(S): 5; 325 TABLET ORAL at 18:50

## 2019-04-14 RX ADMIN — ENOXAPARIN SODIUM 40 MILLIGRAM(S): 100 INJECTION SUBCUTANEOUS at 13:33

## 2019-04-14 RX ADMIN — OXYCODONE AND ACETAMINOPHEN 2 TABLET(S): 5; 325 TABLET ORAL at 16:59

## 2019-04-14 RX ADMIN — OXYCODONE AND ACETAMINOPHEN 2 TABLET(S): 5; 325 TABLET ORAL at 19:30

## 2019-04-14 RX ADMIN — OXYCODONE AND ACETAMINOPHEN 2 TABLET(S): 5; 325 TABLET ORAL at 11:19

## 2019-04-14 RX ADMIN — OXYCODONE AND ACETAMINOPHEN 2 TABLET(S): 5; 325 TABLET ORAL at 04:28

## 2019-04-14 RX ADMIN — OXYCODONE AND ACETAMINOPHEN 2 TABLET(S): 5; 325 TABLET ORAL at 20:59

## 2019-04-14 RX ADMIN — OXYCODONE AND ACETAMINOPHEN 2 TABLET(S): 5; 325 TABLET ORAL at 10:18

## 2019-04-14 RX ADMIN — Medication 100 MILLIGRAM(S): at 13:32

## 2019-04-14 RX ADMIN — OXYCODONE AND ACETAMINOPHEN 2 TABLET(S): 5; 325 TABLET ORAL at 03:28

## 2019-04-14 RX ADMIN — OXYCODONE AND ACETAMINOPHEN 2 TABLET(S): 5; 325 TABLET ORAL at 21:59

## 2019-04-14 RX ADMIN — Medication 100 MILLIGRAM(S): at 21:00

## 2019-04-14 RX ADMIN — PANTOPRAZOLE SODIUM 40 MILLIGRAM(S): 20 TABLET, DELAYED RELEASE ORAL at 08:18

## 2019-04-14 RX ADMIN — Medication 100 MILLIGRAM(S): at 05:41

## 2019-04-15 PROCEDURE — 76942 ECHO GUIDE FOR BIOPSY: CPT | Mod: 26

## 2019-04-15 PROCEDURE — 10005 FNA BX W/US GDN 1ST LES: CPT

## 2019-04-15 RX ADMIN — OXYCODONE AND ACETAMINOPHEN 2 TABLET(S): 5; 325 TABLET ORAL at 10:20

## 2019-04-15 RX ADMIN — OXYCODONE AND ACETAMINOPHEN 2 TABLET(S): 5; 325 TABLET ORAL at 15:16

## 2019-04-15 RX ADMIN — OXYCODONE AND ACETAMINOPHEN 2 TABLET(S): 5; 325 TABLET ORAL at 22:55

## 2019-04-15 RX ADMIN — Medication 100 MILLIGRAM(S): at 14:14

## 2019-04-15 RX ADMIN — OXYCODONE AND ACETAMINOPHEN 2 TABLET(S): 5; 325 TABLET ORAL at 02:22

## 2019-04-15 RX ADMIN — PANTOPRAZOLE SODIUM 40 MILLIGRAM(S): 20 TABLET, DELAYED RELEASE ORAL at 07:23

## 2019-04-15 RX ADMIN — OXYCODONE AND ACETAMINOPHEN 2 TABLET(S): 5; 325 TABLET ORAL at 03:22

## 2019-04-15 RX ADMIN — Medication 100 MILLIGRAM(S): at 21:40

## 2019-04-15 RX ADMIN — OXYCODONE AND ACETAMINOPHEN 2 TABLET(S): 5; 325 TABLET ORAL at 21:55

## 2019-04-15 RX ADMIN — OXYCODONE AND ACETAMINOPHEN 2 TABLET(S): 5; 325 TABLET ORAL at 16:15

## 2019-04-15 RX ADMIN — OXYCODONE AND ACETAMINOPHEN 2 TABLET(S): 5; 325 TABLET ORAL at 09:22

## 2019-04-15 NOTE — PROGRESS NOTE ADULT - PROBLEM SELECTOR PLAN 5
Optimise blood pressure control.

## 2019-04-15 NOTE — PROGRESS NOTE ADULT - ASSESSMENT
63 year old woman with metastatic breast cancer and pathological spine fracture.
63 y o b female SP L breast surgery done on3/19/2015 at [facility]  REPORT: atypical hyperplasia and intraductal papilloma .no evidence of cancer of breast
63 y o b female with anemia , multiple lytic lesions on different MRIs SP bone marrow aspirate  and biopsy   SP bone biopsy   Bone marrow suggestive of epithelial cancer
63 y o b female with anemia SP bone marrow aspirate and biopsy : NORMAL STUDY   SP bone biopsy ; SPECIMEN SENT FOR OTHER TEST   With nodule at the Rt thyroid gland   CT of chest and abdomen : no ;primary found   Multiple lytic lesions on different MRIs
63 y o b female with anemia SP bone marrow aspirate and biopsy : no evidence of multiple Myeloma normal marrow biopsy   SP bone  biopsy : specimen sent other study   Thyroid sonogram  2 nodules at the Rt lobe
63 y o b female with anemia metastatic disease to bone   SP bone marrow aspirate and biopsy   Malignant cells  of epithelial origin   Bone biopsy further report not available yet
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
63 y o b female with diffuse metastatic disease with anemia S[PM bone marrow aspirate and biopsy and bone biopsy on 04/05/209   Primary unknown yet   Negative MR of lumbosacral spine I for cord compression
63 y o b female with metastatic bony disease S/P bone biopsy and bone marrow aspirate and biopsy   Spoke to hematopathologist at [facility] no evidence of Multiple Myeloma on aspirate ( bone marrow  biopsy is pending)   Spoke to pathologist at [facility]  bone biopsy  fibrosis , specimen sent for other study
63 y o b female with metastatic disease to bone SP PAP smear 2 years ago : normal   SP colonoscopy 2 years ago ,normal as per patient   SP bone marrow aspirate and biopsy and bone biopsy   Primary unknown
63 y o b female with nortmocytic normochromic anemia lytic lesion in all CTs   SP mammogram in 2018: negative study   SP bone marrow aspirate and biopsy and bone biopsy   All tumor markers negative   Spokev to pathology : REPORT WILL BE AVAILABLE ON 04/10/2019
63 y o b male with anemia multiple lytic lesions on different CT   no history of L breast cancer as previously thought
63 year old woman with metastatic breast cancer and pathological spine fracture.

## 2019-04-15 NOTE — PROGRESS NOTE ADULT - PROBLEM SELECTOR PROBLEM 2
Malignant neoplasm of female breast, unspecified estrogen receptor status, unspecified laterality, unspecified site of breast
Back pain
Malignant neoplasm of female breast, unspecified estrogen receptor status, unspecified laterality, unspecified site of breast
Osseous metastasis
Malignant neoplasm of female breast, unspecified estrogen receptor status, unspecified laterality, unspecified site of breast
Malignant neoplasm of female breast, unspecified estrogen receptor status, unspecified laterality, unspecified site of breast

## 2019-04-15 NOTE — PROGRESS NOTE ADULT - PROBLEM SELECTOR PROBLEM 4
Morbid obesity

## 2019-04-15 NOTE — PROGRESS NOTE ADULT - PROBLEM SELECTOR PROBLEM 3
DAMASO (obstructive sleep apnea)

## 2019-04-15 NOTE — PROGRESS NOTE ADULT - SUBJECTIVE AND OBJECTIVE BOX
Patient is a 63y old  Female who presents with a chief complaint of back pain (09 Apr 2019 15:49)      INTERVAL HPI/OVERNIGHT EVENTS:  No new complaints.  Family requesting to be referred to [facility]  MEDICATIONS  (STANDING):  docusate sodium 100 milliGRAM(s) Oral three times a day  enoxaparin Injectable 40 milliGRAM(s) SubCutaneous daily  pantoprazole    Tablet 40 milliGRAM(s) Oral before breakfast  senna 2 Tablet(s) Oral at bedtime    MEDICATIONS  (PRN):  magnesium hydroxide Suspension 30 milliLiter(s) Oral daily PRN Constipation  oxyCODONE    5 mG/acetaminophen 325 mG 2 Tablet(s) Oral every 4 hours PRN Moderate Pain (4 - 6)      Allergies    penicillins (Hives)    Intolerances        REVIEW OF SYSTEMS:  CONSTITUTIONAL: No fever, weight loss, or fatigue  EYES: No eye pain, visual disturbances, or discharge  ENMT:  No difficulty hearing, tinnitus, vertigo; No sinus or throat pain  NECK: No pain or stiffness  BREASTS: No pain, masses, or nipple discharge  RESPIRATORY: No cough, wheezing, chills or hemoptysis; No shortness of breath  CARDIOVASCULAR: No chest pain, palpitations, dizziness, or leg swelling  GASTROINTESTINAL: No abdominal or epigastric pain. No nausea, vomiting, or hematemesis; No diarrhea or constipation. No melena or hematochezia.  GENITOURINARY: No dysuria, frequency, hematuria, or incontinence  NEUROLOGICAL: No headaches, memory loss, loss of strength, numbness, or tremors  SKIN: No itching, burning, rashes, or lesions   LYMPH NODES: No enlarged glands  ENDOCRINE: No heat or cold intolerance; No hair loss  MUSCULOSKELETAL: No joint pain or swelling; No muscle, back, or extremity pain  PSYCHIATRIC: No depression, anxiety, mood swings, or difficulty sleeping  HEME/LYMPH: No easy bruising, or bleeding gums  ALLERGY AND IMMUNOLOGIC: No hives or eczema    Vital Signs Last 24 Hrs  T(C): 36.2 (14 Apr 2019 17:00), Max: 37.7 (14 Apr 2019 00:21)  T(F): 97.1 (14 Apr 2019 17:00), Max: 99.8 (14 Apr 2019 00:21)  HR: 64 (14 Apr 2019 17:00) (64 - 73)  BP: 135/76 (14 Apr 2019 17:00) (106/61 - 135/76)  BP(mean): --  RR: 18 (14 Apr 2019 17:00) (17 - 18)  SpO2: 98% (14 Apr 2019 17:00) (98% - 100%)    PHYSICAL EXAM:  GENERAL: NAD, well-groomed, well-developed  HEAD:  Atraumatic, Normocephalic  EYES: EOMI, PERRLA, conjunctiva and sclera clear  ENMT: No tonsillar erythema, exudates, or enlargement; Moist mucous membranes, Good dentition, No lesions  NECK: Supple, No JVD, Normal thyroid  NERVOUS SYSTEM:  Alert & Oriented X3, Good concentration; Motor Strength 5/5 B/L upper and lower extremities; DTRs 2+ intact and symmetric  CHEST/LUNG: Clear to percussion bilaterally; No rales, rhonchi, wheezing, or rubs  HEART: Regular rate and rhythm; No murmurs, rubs, or gallops  ABDOMEN: Soft, Nontender, Nondistended; Bowel sounds present  EXTREMITIES:  2+ Peripheral Pulses, No clubbing, cyanosis, or edema  LYMPH: No lymphadenopathy noted  SKIN: No rashes or lesions    LABS:              CAPILLARY BLOOD GLUCOSE        CULTURES:    HEMOGLOBIN A1C:    CHOLESTEROL:        RADIOLOGY & ADDITIONAL TESTS:
63yFemale s/p Cervical/Sacral compression fxs Pt seen and examined in NAD. Pain controlled. Pt denies any new complaints. Pt denies CP/SOB/N/V/D/numbness/tingling/bowel or bladder dysfunction.     PE:       B/L UE: Skin intact. +ROM shoulder/elbow/wrist/fingers. +ok/thumbsup/fingercross signs.  strength: 5/5.  RP2+ NVI.   B/L LE: Skin intact. +ROM hip/knee/ankle/toes. Ankle Dorsi/plantarflexion: 5/5. Calf: soft, compressible and nontender. DP/PT 2+ NVI.                               8.9    5.02  )-----------( 260      ( 06 Apr 2019 07:25 )             28.1                 A/P: 63yFemale Cervical/Sacral compression fracture.  Pain controlled  MRI C-Spine/Thoracic spine  PT: [facility]AT - spinal precautions   DVT prophylaxis when bed bound   Wound care, Isometric exercises, incentive spirometry   Discharge: planning   All the above discussed and understood by pt
[id number]    Patient is a 63y old  Female who presents with a chief complaint of compression fracture spine (2019 12:23)      MEDICATIONS  (STANDING):  docusate sodium 100 milliGRAM(s) Oral daily  docusate sodium 100 milliGRAM(s) Oral three times a day  enoxaparin Injectable 40 milliGRAM(s) SubCutaneous daily  pantoprazole    Tablet 40 milliGRAM(s) Oral before breakfast  senna 2 Tablet(s) Oral at bedtime    MEDICATIONS  (PRN):  magnesium hydroxide Suspension 30 milliLiter(s) Oral daily PRN Constipation  morphine  - Injectable 2 milliGRAM(s) IV Push every 4 hours PRN Severe Pain (7 - 10)  oxyCODONE    5 mG/acetaminophen 325 mG 1 Tablet(s) Oral every 6 hours PRN Moderate Pain (4 - 6)      Daily     Daily Weight in k.2 (2019 05:19)    REVIEW OF SYSTEMS:    CONSTITUTIONAL: No fevers or chills  EYES/ENT: No visual changes;  No vertigo or throat pain   NECK: No pain or stiffness  RESPIRATORY: No cough, wheezing, hemoptysis; No shortness of breath  CARDIOVASCULAR: No chest pain or palpitations  GASTROINTESTINAL: No abdominal or epigastric pain. No nausea, vomiting, or hematemesis; No diarrhea or constipation. No melena or hematochezia.  GENITOURINARY: No dysuria, frequency or hematuria  NEUROLOGICAL:  weakness of the L lower leg   SKIN: No itching, rashes    Vital Signs Last 24 Hrs  T(C): 37.9 (2019 17:28), Max: 37.9 (2019 17:28)  T(F): 100.3 (2019 17:28), Max: 100.3 (2019 17:28)  HR: 70 (2019 17:28) (70 - 89)  BP: 102/56 (2019 17:) (102/56 - 135/75)  BP(mean): --  RR: 18 (2019 17:28) (18 - 18)  SpO2: 100% (2019 17:28) (99% - 100%)    PHYSICAL EXAM:  GENERAL: NAD, well-developed  HEAD:  Atraumatic, Normocephalic  EYES: pale conjunctiva and sclera clear  NECK: Supple, No JVD  BREAST:   CHEST/LUNG: Clear   HEART: Regular rate and rhythm; No murmurs, rubs, or gallops  ABDOMEN: Soft, Nontender, Nondistended; Bowel sounds present  EXTREMITIES:  2+ Peripheral Pulses, No clubbing, cyanosis, or edema  PSYCH: AAOx3  NEUROLOGY: decreased strength of L lower leg   SKIN: No rashes or lesions    LABS:  CBC Full  -  ( 2019 07:25 )  WBC Count : 5.02 K/uL  RBC Count : 3.33 M/uL  Hemoglobin : 8.9 g/dL  Hematocrit : 28.1 %  Platelet Count - Automated : 260 K/uL  Mean Cell Volume : 84.4 fl  Mean Cell Hemoglobin : 26.7 pg  Mean Cell Hemoglobin Concentration : 31.7 gm/dL  Auto Neutrophil # : x  Auto Lymphocyte # : x  Auto Monocyte # : x  Auto Eosinophil # : x  Auto Basophil # : x  Auto Neutrophil % : x  Auto Lymphocyte % : x  Auto Monocyte % : x  Auto Eosinophil % : x  Auto Basophil % : x                CULTURES:    RADIOLOGY & ADDITIONAL STUDIES:
[id number]    Patient is a 63y old  Female who presents with a chief complaint of back pain (09 Apr 2019 15:49)      MEDICATIONS  (STANDING):  docusate sodium 100 milliGRAM(s) Oral three times a day  enoxaparin Injectable 40 milliGRAM(s) SubCutaneous daily  pantoprazole    Tablet 40 milliGRAM(s) Oral before breakfast  senna 2 Tablet(s) Oral at bedtime    MEDICATIONS  (PRN):  magnesium hydroxide Suspension 30 milliLiter(s) Oral daily PRN Constipation  oxyCODONE    5 mG/acetaminophen 325 mG 2 Tablet(s) Oral every 4 hours PRN Moderate Pain (4 - 6)      Daily     Daily     REVIEW OF SYSTEMS:    CONSTITUTIONAL: pain of the L hip   EYES/ENT: No visual changes;  No vertigo or throat pain   NECK: No pain or stiffness  RESPIRATORY: No cough, wheezing, hemoptysis; No shortness of breath  CARDIOVASCULAR: No chest pain or palpitations  GASTROINTESTINAL: No abdominal or epigastric pain. No nausea, vomiting, or hematemesis; No diarrhea or constipation. No melena or hematochezia.  GENITOURINARY: No dysuria, frequency or hematuria  NEUROLOGICAL weakness of the L lower leg   SKIN: No itching, rashes    Vital Signs Last 24 Hrs  T(C): 37.2 (13 Apr 2019 11:35), Max: 37.7 (12 Apr 2019 18:41)  T(F): 98.9 (13 Apr 2019 11:35), Max: 99.9 (12 Apr 2019 18:41)  HR: 83 (13 Apr 2019 15:10) (60 - 83)  BP: 131/63 (13 Apr 2019 15:10) (99/53 - 131/63)  BP(mean): --  RR: 18 (13 Apr 2019 15:10) (17 - 18)  SpO2: 100% (13 Apr 2019 15:10) (98% - 100%)    PHYSICAL EXAM:  GENERAL: NAD, well-developed  HEAD:  Atraumatic, Normocephalic  EYES: pale , conjunctiva and sclera clear  NECK: Supple, No JVD  BREAST:   CHEST/LUNG: Clear   HEART: Regular rate and rhythm;  ABDOMEN: Soft, Nontender, Nondistended; Bowel sounds present  EXTREMITIES:  2+ Peripheral Pulses, No clubbing, cyanosis, or edema  PSYCH: AAOx3  NEUROLOGY: non-focal      LABS:  CBC Full  -  ( 12 Apr 2019 08:01 )  WBC Count : 5.26 K/uL  RBC Count : 3.64 M/uL  Hemoglobin : 9.9 g/dL  Hematocrit : 31.8 %  Platelet Count - Automated : 338 K/uL  Mean Cell Volume : 87.4 fl  Mean Cell Hemoglobin : 27.2 pg  Mean Cell Hemoglobin Concentration : 31.1 gm/dL  Auto Neutrophil # : x  Auto Lymphocyte # : x  Auto Monocyte # : x  Auto Eosinophil # : x  Auto Basophil # : x  Auto Neutrophil % : x  Auto Lymphocyte % : x  Auto Monocyte % : x  Auto Eosinophil % : x  Auto Basophil % : x    04-12    138  |  102  |  8   ----------------------------<  105<H>  3.5   |  27  |  0.72    Ca    9.3      12 Apr 2019 08:01            CULTURES:    RADIOLOGY & ADDITIONAL STUDIES:
[id number]    Patient is a 63y old  Female who presents with a chief complaint of back pain (2019 15:49)      MEDICATIONS  (STANDING):  docusate sodium 100 milliGRAM(s) Oral three times a day  docusate sodium 100 milliGRAM(s) Oral daily  enoxaparin Injectable 40 milliGRAM(s) SubCutaneous daily  pantoprazole    Tablet 40 milliGRAM(s) Oral before breakfast  senna 2 Tablet(s) Oral at bedtime    MEDICATIONS  (PRN):  magnesium hydroxide Suspension 30 milliLiter(s) Oral daily PRN Constipation  oxyCODONE    5 mG/acetaminophen 325 mG 2 Tablet(s) Oral every 4 hours PRN Moderate Pain (4 - 6)      Daily     Daily Weight in k.3 (2019 05:45)    REVIEW OF SYSTEMS:    CONSTITUTIONAL: No, fevers or chills  EYES/ENT: No visual changes;  No vertigo or throat pain   NECK: No pain or stiffness  RESPIRATORY: No cough, wheezing, hemoptysis; No shortness of breath  CARDIOVASCULAR: No chest pain or palpitations  GASTROINTESTINAL: No abdominal or epigastric pain. No nausea, vomiting, or hematemesis; No diarrhea or constipation. No melena or hematochezia.  GENITOURINARY: No dysuria, frequency or hematuria  NEUROLOGICAL: weakness of L lower leg       Vital Signs Last 24 Hrs  T(C): 36.8 (2019 18:20), Max: 37.5 (10 Apr 2019 23:05)  T(F): 98.3 (2019 18:20), Max: 99.5 (10 Apr 2019 23:05)  HR: 72 (2019 18:20) (65 - 75)  BP: 101/62 (2019 18:20) (101/62 - 116/64)  BP(mean): --  RR: 17 (2019 18:20) (17 - 18)  SpO2: 100% (2019 18:20) (100% - 100%)    PHYSICAL EXAM:  GENERAL: NAD, well-developed  HEAD:  Atraumatic, Normocephalic  EYES:  conjunctiva and sclera clear  NECK: Supple, No JVD  BREAST:   CHEST/LUNG: Clear   HEART: Regular rate and rhythm; No murmurs   ABDOMEN: Soft, Nontender, Nondistended; Bowel sounds present  EXTREMITIES:  2+ Peripheral Pulses, No clubbing, cyanosis, or edema  PSYCH: AAOx3 no change         LABS:  CBC Full  -  ( 10 Apr 2019 08:27 )  WBC Count : 4.48 K/uL  RBC Count : 3.26 M/uL  Hemoglobin : 8.8 g/dL  Hematocrit : 28.2 %  Platelet Count - Automated : 247 K/uL  Mean Cell Volume : 86.5 fl  Mean Cell Hemoglobin : 27.0 pg  Mean Cell Hemoglobin Concentration : 31.2 gm/dL  Auto Neutrophil # : x  Auto Lymphocyte # : x  Auto Monocyte # : x  Auto Eosinophil # : x  Auto Basophil # : x  Auto Neutrophil % : x  Auto Lymphocyte % : x  Auto Monocyte % : x  Auto Eosinophil % : x  Auto Basophil % : x                CULTURES:    RADIOLOGY & ADDITIONAL STUDIES:
[id number]    Patient is a 63y old  Female who presents with a chief complaint of back pain (2019 15:49)      MEDICATIONS  (STANDING):  docusate sodium 100 milliGRAM(s) Oral three times a day  docusate sodium 100 milliGRAM(s) Oral daily  enoxaparin Injectable 40 milliGRAM(s) SubCutaneous daily  pantoprazole    Tablet 40 milliGRAM(s) Oral before breakfast  senna 2 Tablet(s) Oral at bedtime    MEDICATIONS  (PRN):  magnesium hydroxide Suspension 30 milliLiter(s) Oral daily PRN Constipation  morphine  - Injectable 2 milliGRAM(s) IV Push every 4 hours PRN Severe Pain (7 - 10)  oxyCODONE    5 mG/acetaminophen 325 mG 2 Tablet(s) Oral every 4 hours PRN Moderate Pain (4 - 6)      Daily     Daily Weight in k (10 Apr 2019 09:12)    REVIEW OF SYSTEMS:    CONSTITUTIONAL: No, fevers or chills  EYES/ENT: No visual changes;  No vertigo or throat pain   NECK: No pain or stiffness  RESPIRATORY: No cough, wheezing, hemoptysis; No shortness of breath  CARDIOVASCULAR: No chest pain or palpitations  GASTROINTESTINAL: No abdominal or epigastric pain. No nausea, vomiting, or hematemesis; No diarrhea or constipation. No melena or hematochezia.  GENITOURINARY: No dysuria, frequency or hematuria  NEUROLOGICAL: weakness of L lower  leg       Vital Signs Last 24 Hrs  T(C): 37.3 (10 Apr 2019 18:36), Max: 37.4 (2019 23:12)  T(F): 99.1 (10 Apr 2019 18:36), Max: 99.3 (2019 23:12)  HR: 73 (10 Apr 2019 18:36) (69 - 73)  BP: 135/62 (10 Apr 2019 18:36) (101/50 - 135/62)  BP(mean): --  RR: 16 (10 Apr 2019 18:36) (16 - 18)  SpO2: 99% (10 Apr 2019 18:36) (91% - 99%)    PHYSICAL EXAM:  GENERAL: NAD, well-developed  HEAD:  Atraumatic, Normocephalic  EYES: pale  conjunctiva and sclera clear  NECK: Supple, No JVD  BREAST:   CHEST/LUNG: Clear   HEART: Regular rate and rhythm; No murmurs, rubs, or gallops  ABDOMEN: Soft, Nontender, Nondistended; Bowel sounds present  EXTREMITIES:  2+ Peripheral Pulses, No clubbing, cyanosis, or edema  PSYCH: AAOx3  NEUROLOGY: no change     LABS:  CBC Full  -  ( 10 Apr 2019 08:27 )  WBC Count : 4.48 K/uL  RBC Count : 3.26 M/uL  Hemoglobin : 8.8 g/dL  Hematocrit : 28.2 %  Platelet Count - Automated : 247 K/uL  Mean Cell Volume : 86.5 fl  Mean Cell Hemoglobin : 27.0 pg  Mean Cell Hemoglobin Concentration : 31.2 gm/dL  Auto Neutrophil # : x  Auto Lymphocyte # : x  Auto Monocyte # : x  Auto Eosinophil # : x  Auto Basophil # : x  Auto Neutrophil % : x  Auto Lymphocyte % : x  Auto Monocyte % : x  Auto Eosinophil % : x  Auto Basophil % : x                CULTURES:    RADIOLOGY & ADDITIONAL STUDIES:
[id number]    Patient is a 63y old  Female who presents with a chief complaint of compression fracture spine (2019 12:23)      MEDICATIONS  (STANDING):  docusate sodium 100 milliGRAM(s) Oral daily  docusate sodium 100 milliGRAM(s) Oral three times a day  enoxaparin Injectable 40 milliGRAM(s) SubCutaneous daily  pantoprazole    Tablet 40 milliGRAM(s) Oral before breakfast  senna 2 Tablet(s) Oral at bedtime    MEDICATIONS  (PRN):  magnesium hydroxide Suspension 30 milliLiter(s) Oral daily PRN Constipation  morphine  - Injectable 2 milliGRAM(s) IV Push every 4 hours PRN Severe Pain (7 - 10)  oxyCODONE    5 mG/acetaminophen 325 mG 1 Tablet(s) Oral every 6 hours PRN Moderate Pain (4 - 6)      Daily     Daily Weight in k.4 (2019 05:36)    REVIEW OF SYSTEMS: L hip pain     CONSTITUTIONAL: No, fevers or chills  EYES/ENT: No visual changes;  No vertigo or throat pain   NECK: No pain or stiffness  RESPIRATORY: No cough, wheezing, hemoptysis; No shortness of breath  CARDIOVASCULAR: No chest pain or palpitations  GASTROINTESTINAL: No abdominal or epigastric pain. No nausea, vomiting, or hematemesis; No diarrhea or constipation. No melena or hematochezia.  GENITOURINARY: No dysuria, frequency or hematuria  NEUROLOGICAL: weakness of the L lower leg   SKIN: No itching, rashes    Vital Signs Last 24 Hrs  T(C): 36.8 (2019 05:36), Max: 37.1 (2019 17:30)  T(F): 98.3 (2019 05:36), Max: 98.8 (2019 17:30)  HR: 74 (2019 05:36) (74 - 99)  BP: 124/67 (2019 05:36) (116/64 - 127/60)  BP(mean): --  RR: 18 (2019 05:36) (16 - 18)  SpO2: 97% (2019 05:36) (97% - 99%)    PHYSICAL EXAM:  GENERAL: NAD, well-developed  HEAD:  Atraumatic, Normocephalic  EYES: pale , conjunctiva and sclera clear  NECK: Supple, No JVD  BREAST:   CHEST/LUNG: Clear to auscultation bilaterally; No wheeze  HEART: Regular rate and rhythm; No murmurs, rubs, or gallops  ABDOMEN: Soft, Nontender, Nondistended; Bowel sounds present  EXTREMITIES:  2+ Peripheral Pulses, No clubbing, cyanosis, or edema  PSYCH: AAOx3  NEUROLOGY: decrease of strength of L lower leg   SKIN: No rashes or lesions    LABS:                CULTURES:    RADIOLOGY & ADDITIONAL STUDIES:
[id number]    Patient is a 63y old  Female who presents with a chief complaint of hip pain (2019 10:56)      MEDICATIONS  (STANDING):  docusate sodium 100 milliGRAM(s) Oral daily  pantoprazole    Tablet 40 milliGRAM(s) Oral before breakfast    MEDICATIONS  (PRN):  morphine  - Injectable 2 milliGRAM(s) IV Push every 4 hours PRN Severe Pain (7 - 10)  oxyCODONE    5 mG/acetaminophen 325 mG 1 Tablet(s) Oral every 6 hours PRN Moderate Pain (4 - 6)      Daily     Daily Weight in k.2 (2019 05:41)    REVIEW OF SYSTEMS:    CONSTITUTIONAL: No weakness of the L lower leg,   EYES/ENT: No visual changes;  No vertigo or throat pain   NECK: No pain or stiffness  RESPIRATORY: No cough, wheezing, hemoptysis; No shortness of breath  CARDIOVASCULAR: No chest pain or palpitations  GASTROINTESTINAL: No abdominal or epigastric pain. No nausea, vomiting, or hematemesis; No diarrhea or constipation. No melena or hematochezia.  GENITOURINARY: No dysuria, frequency or hematuria  NEUROLOGICAL:       Vital Signs Last 24 Hrs  T(C): 37 (2019 05:41), Max: 37.9 (2019 23:47)  T(F): 98.6 (2019 05:41), Max: 100.3 (2019 23:47)  HR: 78 (2019 05:41) (78 - 82)  BP: 111/54 (2019 05:41) (102/60 - 138/66)  BP(mean): --  RR: 16 (2019 05:41) (16 - 17)  SpO2: 100% (2019 05:41) (98% - 100%)    PHYSICAL EXAM:  GENERAL: NAD, well-developed  HEAD:  Atraumatic, Normocephalic  EYES pale  conjunctiva and sclera clear  NECK: Supple, No JVD  BREAST:   CHEST/LUNG: Clear   HEART: Regular rate and rhythm; No murmurs, rubs, or gallops  ABDOMEN: Soft, Nontender, Nondistended; Bowel sounds present  EXTREMITIES:  2+ Peripheral Pulses, No clubbing, cyanosis, or edema  tenderness at the L hip on palpation  PSYCH: AAOx3  NEUROLOGY: L lower leg 3/5      LABS:  CBC Full  -  ( 2019 06:32 )  WBC Count : 5.30 K/uL  RBC Count : 3.61 M/uL  Hemoglobin : 9.8 g/dL  Hematocrit : 30.9 %  Platelet Count - Automated : 300 K/uL  Mean Cell Volume : 85.6 fl  Mean Cell Hemoglobin : 27.1 pg  Mean Cell Hemoglobin Concentration : 31.7 gm/dL  Auto Neutrophil # : x  Auto Lymphocyte # : x  Auto Monocyte # : x  Auto Eosinophil # : x  Auto Basophil # : x  Auto Neutrophil % : x  Auto Lymphocyte % : x  Auto Monocyte % : x  Auto Eosinophil % : x  Auto Basophil % : x        TPro  6.8  /  Alb  2.7<L>  /  TBili  x   /  DBili  x   /  AST  x   /  ALT  x   /  AlkPhos  x   -    PT/INR - ( 2019 06:32 )   PT: 15.6 sec;   INR: 1.38 ratio         PTT - ( 2019 06:32 )  PTT:37.9 sec      CULTURES:    RADIOLOGY & ADDITIONAL STUDIES:
[id number]    Patient is a 63y old  Female who presents with a chief complaint of back pain (2019 15:49)      MEDICATIONS  (STANDING):  docusate sodium 100 milliGRAM(s) Oral three times a day  enoxaparin Injectable 40 milliGRAM(s) SubCutaneous daily  pantoprazole    Tablet 40 milliGRAM(s) Oral before breakfast  senna 2 Tablet(s) Oral at bedtime    MEDICATIONS  (PRN):  magnesium hydroxide Suspension 30 milliLiter(s) Oral daily PRN Constipation  oxyCODONE    5 mG/acetaminophen 325 mG 2 Tablet(s) Oral every 4 hours PRN Moderate Pain (4 - 6)      Daily     Daily Weight in k.9 (2019 05:48)    REVIEW OF SYSTEMS:    CONSTITUTIONAL: No, fevers or chills  EYES/ENT: No visual changes;  No vertigo or throat pain   NECK: No pain or stiffness  RESPIRATORY: No cough, wheezing, hemoptysis; No shortness of breath  CARDIOVASCULAR: No chest pain or palpitations  GASTROINTESTINAL: No abdominal or epigastric pain. No nausea, vomiting, or hematemesis; No diarrhea or constipation. No melena or hematochezia.  GENITOURINARY: No dysuria, frequency or hematuria  NEUROLOGICAL: weakness of the L lower leg       Vital Signs Last 24 Hrs  T(C): 36.5 (2019 10:56), Max: 37.7 (2019 00:21)  T(F): 97.7 (2019 10:56), Max: 99.8 (2019 00:21)  HR: 71 (2019 10:56) (62 - 73)  BP: 106/61 (2019 10:56) (106/61 - 128/61)  BP(mean): --  RR: 17 (2019 10:56) (17 - 18)  SpO2: 100% (2019 10:56) (99% - 100%)    PHYSICAL EXAM:  GENERAL: NAD, well-developed  HEAD:  Atraumatic, Normocephalic  EYES: EOMI, PERRLA, conjunctiva and sclera clear  NECK: Supple, No JVD  BREAST:   CHEST/LUNG: Clear  HEART: Regular rate and rhythm;   ABDOMEN: Soft, Nontender, Nondistended; Bowel sounds present  EXTREMITIES:  2+ Peripheral Pulses, No clubbing, cyanosis, or edema  PSYCH: AAOx3  NEUROLOGY: no change     LABS:                CULTURES:    RADIOLOGY & ADDITIONAL STUDIES:
Patient is a 63y old  Female who presents with a chief complaint of back pain (09 Apr 2019 15:49)      INTERVAL HPI/OVERNIGHT EVENTS:  Back pain persisting. Scheduled for thyroid biopsy monday.  MEDICATIONS  (STANDING):  docusate sodium 100 milliGRAM(s) Oral three times a day  enoxaparin Injectable 40 milliGRAM(s) SubCutaneous daily  pantoprazole    Tablet 40 milliGRAM(s) Oral before breakfast  senna 2 Tablet(s) Oral at bedtime    MEDICATIONS  (PRN):  magnesium hydroxide Suspension 30 milliLiter(s) Oral daily PRN Constipation  oxyCODONE    5 mG/acetaminophen 325 mG 2 Tablet(s) Oral every 4 hours PRN Moderate Pain (4 - 6)      Allergies    penicillins (Hives)    Intolerances        REVIEW OF SYSTEMS:  CONSTITUTIONAL: No fever, weight loss, or fatigue  EYES: No eye pain, visual disturbances, or discharge  ENMT:  No difficulty hearing, tinnitus, vertigo; No sinus or throat pain  NECK: No pain or stiffness  BREASTS: No pain, masses, or nipple discharge  RESPIRATORY: No cough, wheezing, chills or hemoptysis; No shortness of breath  CARDIOVASCULAR: No chest pain, palpitations, dizziness, or leg swelling  GASTROINTESTINAL: No abdominal or epigastric pain. No nausea, vomiting, or hematemesis; No diarrhea or constipation. No melena or hematochezia.  GENITOURINARY: No dysuria, frequency, hematuria, or incontinence  NEUROLOGICAL: No headaches, memory loss, loss of strength, numbness, or tremors  SKIN: No itching, burning, rashes, or lesions   LYMPH NODES: No enlarged glands  ENDOCRINE: No heat or cold intolerance; No hair loss  MUSCULOSKELETAL: No joint pain or swelling; No muscle, back, or extremity pain  PSYCHIATRIC: No depression, anxiety, mood swings, or difficulty sleeping  HEME/LYMPH: No easy bruising, or bleeding gums  ALLERGY AND IMMUNOLOGIC: No hives or eczema    Vital Signs Last 24 Hrs  T(C): 37.7 (12 Apr 2019 18:41), Max: 37.7 (12 Apr 2019 18:41)  T(F): 99.9 (12 Apr 2019 18:41), Max: 99.9 (12 Apr 2019 18:41)  HR: 68 (12 Apr 2019 18:41) (66 - 79)  BP: 107/63 (12 Apr 2019 18:41) (104/66 - 123/61)  BP(mean): --  RR: 18 (12 Apr 2019 18:41) (17 - 18)  SpO2: 100% (12 Apr 2019 18:41) (98% - 100%)    PHYSICAL EXAM:  GENERAL: NAD, well-groomed, well-developed  HEAD:  Atraumatic, Normocephalic  EYES: EOMI, PERRLA, conjunctiva and sclera clear  ENMT: No tonsillar erythema, exudates, or enlargement; Moist mucous membranes, Good dentition, No lesions  NECK: Supple, No JVD, Normal thyroid  NERVOUS SYSTEM:  Alert & Oriented X3, Good concentration; Motor Strength 5/5 B/L upper and lower extremities; DTRs 2+ intact and symmetric  CHEST/LUNG: Clear to percussion bilaterally; No rales, rhonchi, wheezing, or rubs  HEART: Regular rate and rhythm; No murmurs, rubs, or gallops  ABDOMEN: Soft, Nontender, Nondistended; Bowel sounds present  EXTREMITIES:  2+ Peripheral Pulses, No clubbing, cyanosis, or edema  LYMPH: No lymphadenopathy noted  SKIN: No rashes or lesions    LABS:                        9.9    5.26  )-----------( 338      ( 12 Apr 2019 08:01 )             31.8     04-12    138  |  102  |  8   ----------------------------<  105<H>  3.5   |  27  |  0.72    Ca    9.3      12 Apr 2019 08:01          CAPILLARY BLOOD GLUCOSE        CULTURES:    HEMOGLOBIN A1C:    CHOLESTEROL:        RADIOLOGY & ADDITIONAL TESTS:
Patient is a 63y old  Female who presents with a chief complaint of back pain (09 Apr 2019 15:49)      INTERVAL HPI/OVERNIGHT EVENTS:  No new complaints. s/p thyroid biopsy.  For discharge in am.  MEDICATIONS  (STANDING):  docusate sodium 100 milliGRAM(s) Oral three times a day  enoxaparin Injectable 40 milliGRAM(s) SubCutaneous daily  pantoprazole    Tablet 40 milliGRAM(s) Oral before breakfast  senna 2 Tablet(s) Oral at bedtime    MEDICATIONS  (PRN):  magnesium hydroxide Suspension 30 milliLiter(s) Oral daily PRN Constipation  oxyCODONE    5 mG/acetaminophen 325 mG 2 Tablet(s) Oral every 4 hours PRN Moderate Pain (4 - 6)      Allergies    penicillins (Hives)    Intolerances        REVIEW OF SYSTEMS:  CONSTITUTIONAL: No fever, weight loss, or fatigue  EYES: No eye pain, visual disturbances, or discharge  ENMT:  No difficulty hearing, tinnitus, vertigo; No sinus or throat pain  NECK: No pain or stiffness  BREASTS: No pain, masses, or nipple discharge  RESPIRATORY: No cough, wheezing, chills or hemoptysis; No shortness of breath  CARDIOVASCULAR: No chest pain, palpitations, dizziness, or leg swelling  GASTROINTESTINAL: No abdominal or epigastric pain. No nausea, vomiting, or hematemesis; No diarrhea or constipation. No melena or hematochezia.  GENITOURINARY: No dysuria, frequency, hematuria, or incontinence  NEUROLOGICAL: No headaches, memory loss, loss of strength, numbness, or tremors  SKIN: No itching, burning, rashes, or lesions   LYMPH NODES: No enlarged glands  ENDOCRINE: No heat or cold intolerance; No hair loss  MUSCULOSKELETAL: No joint pain or swelling; No muscle, back, or extremity pain  PSYCHIATRIC: No depression, anxiety, mood swings, or difficulty sleeping  HEME/LYMPH: No easy bruising, or bleeding gums  ALLERGY AND IMMUNOLOGIC: No hives or eczema    Vital Signs Last 24 Hrs  T(C): 37 (15 Apr 2019 18:14), Max: 37.1 (15 Apr 2019 00:16)  T(F): 98.6 (15 Apr 2019 18:14), Max: 98.7 (15 Apr 2019 00:16)  HR: 70 (15 Apr 2019 18:14) (70 - 654)  BP: 108/63 (15 Apr 2019 18:14) (100/54 - 134/67)  BP(mean): --  RR: 18 (15 Apr 2019 18:14) (18 - 18)  SpO2: 100% (15 Apr 2019 18:14) (95% - 100%)    PHYSICAL EXAM:  GENERAL: NAD, well-groomed, well-developed  HEAD:  Atraumatic, Normocephalic  EYES: EOMI, PERRLA, conjunctiva and sclera clear  ENMT: No tonsillar erythema, exudates, or enlargement; Moist mucous membranes, Good dentition, No lesions  NECK: Supple, No JVD, Normal thyroid  NERVOUS SYSTEM:  Alert & Oriented X3, Good concentration; Motor Strength 5/5 B/L upper and lower extremities; DTRs 2+ intact and symmetric  CHEST/LUNG: Clear to percussion bilaterally; No rales, rhonchi, wheezing, or rubs  HEART: Regular rate and rhythm; No murmurs, rubs, or gallops  ABDOMEN: Soft, Nontender, Nondistended; Bowel sounds present  EXTREMITIES:  2+ Peripheral Pulses, No clubbing, cyanosis, or edema  LYMPH: No lymphadenopathy noted  SKIN: No rashes or lesions    LABS:              CAPILLARY BLOOD GLUCOSE        CULTURES:    HEMOGLOBIN A1C:    CHOLESTEROL:        RADIOLOGY & ADDITIONAL TESTS:
Patient is a 63y old  Female who presents with a chief complaint of back pain (09 Apr 2019 15:49)      INTERVAL HPI/OVERNIGHT EVENTS:  Pain persisiting.  MRI unsuccessful.  Bone marrow biopsy report received.  MEDICATIONS  (STANDING):  docusate sodium 100 milliGRAM(s) Oral three times a day  docusate sodium 100 milliGRAM(s) Oral daily  enoxaparin Injectable 40 milliGRAM(s) SubCutaneous daily  pantoprazole    Tablet 40 milliGRAM(s) Oral before breakfast  senna 2 Tablet(s) Oral at bedtime    MEDICATIONS  (PRN):  magnesium hydroxide Suspension 30 milliLiter(s) Oral daily PRN Constipation  morphine  - Injectable 2 milliGRAM(s) IV Push every 4 hours PRN Severe Pain (7 - 10)      Allergies    penicillins (Hives)    Intolerances        REVIEW OF SYSTEMS:  CONSTITUTIONAL: No fever, weight loss, or fatigue  EYES: No eye pain, visual disturbances, or discharge  ENMT:  No difficulty hearing, tinnitus, vertigo; No sinus or throat pain  NECK: No pain or stiffness  BREASTS: No pain, masses, or nipple discharge  RESPIRATORY: No cough, wheezing, chills or hemoptysis; No shortness of breath  CARDIOVASCULAR: No chest pain, palpitations, dizziness, or leg swelling  GASTROINTESTINAL: No abdominal or epigastric pain. No nausea, vomiting, or hematemesis; No diarrhea or constipation. No melena or hematochezia.  GENITOURINARY: No dysuria, frequency, hematuria, or incontinence  NEUROLOGICAL: No headaches, memory loss, loss of strength, numbness, or tremors  SKIN: No itching, burning, rashes, or lesions   LYMPH NODES: No enlarged glands  ENDOCRINE: No heat or cold intolerance; No hair loss  MUSCULOSKELETAL: No joint pain or swelling; No muscle, back, or extremity pain  PSYCHIATRIC: No depression, anxiety, mood swings, or difficulty sleeping  HEME/LYMPH: No easy bruising, or bleeding gums  ALLERGY AND IMMUNOLOGIC: No hives or eczema    Vital Signs Last 24 Hrs  T(C): 37.3 (10 Apr 2019 18:36), Max: 37.4 (09 Apr 2019 23:12)  T(F): 99.1 (10 Apr 2019 18:36), Max: 99.3 (09 Apr 2019 23:12)  HR: 73 (10 Apr 2019 18:36) (69 - 73)  BP: 135/62 (10 Apr 2019 18:36) (101/50 - 135/62)  BP(mean): --  RR: 16 (10 Apr 2019 18:36) (16 - 18)  SpO2: 99% (10 Apr 2019 18:36) (91% - 99%)    PHYSICAL EXAM:  GENERAL: NAD, well-groomed, well-developed  HEAD:  Atraumatic, Normocephalic  EYES: EOMI, PERRLA, conjunctiva and sclera clear  ENMT: No tonsillar erythema, exudates, or enlargement; Moist mucous membranes, Good dentition, No lesions  NECK: Supple, No JVD, Normal thyroid  NERVOUS SYSTEM:  Alert & Oriented X3, Good concentration; Motor Strength 5/5 B/L upper and lower extremities; DTRs 2+ intact and symmetric  CHEST/LUNG: Clear to percussion bilaterally; No rales, rhonchi, wheezing, or rubs  HEART: Regular rate and rhythm; No murmurs, rubs, or gallops  ABDOMEN: Soft, Nontender, Nondistended; Bowel sounds present  EXTREMITIES:  2+ Peripheral Pulses, No clubbing, cyanosis, or edema  LYMPH: No lymphadenopathy noted  SKIN: No rashes or lesions    LABS:                        8.8    4.48  )-----------( 247      ( 10 Apr 2019 08:27 )             28.2               CAPILLARY BLOOD GLUCOSE        CULTURES:    HEMOGLOBIN A1C:    CHOLESTEROL:        RADIOLOGY & ADDITIONAL TESTS:
Patient is a 63y old  Female who presents with a chief complaint of back pain (09 Apr 2019 15:49)      INTERVAL HPI/OVERNIGHT EVENTS:  Patient seen and examined.  Family at her bedside.  Pain better controlled.  MEDICATIONS  (STANDING):  docusate sodium 100 milliGRAM(s) Oral three times a day  docusate sodium 100 milliGRAM(s) Oral daily  enoxaparin Injectable 40 milliGRAM(s) SubCutaneous daily  pantoprazole    Tablet 40 milliGRAM(s) Oral before breakfast  senna 2 Tablet(s) Oral at bedtime    MEDICATIONS  (PRN):  magnesium hydroxide Suspension 30 milliLiter(s) Oral daily PRN Constipation  oxyCODONE    5 mG/acetaminophen 325 mG 2 Tablet(s) Oral every 4 hours PRN Moderate Pain (4 - 6)      Allergies    penicillins (Hives)    Intolerances        REVIEW OF SYSTEMS:  CONSTITUTIONAL: No fever, weight loss, or fatigue  EYES: No eye pain, visual disturbances, or discharge  ENMT:  No difficulty hearing, tinnitus, vertigo; No sinus or throat pain  NECK: No pain or stiffness  BREASTS: No pain, masses, or nipple discharge  RESPIRATORY: No cough, wheezing, chills or hemoptysis; No shortness of breath  CARDIOVASCULAR: No chest pain, palpitations, dizziness, or leg swelling  GASTROINTESTINAL: No abdominal or epigastric pain. No nausea, vomiting, or hematemesis; No diarrhea or constipation. No melena or hematochezia.  GENITOURINARY: No dysuria, frequency, hematuria, or incontinence  NEUROLOGICAL: No headaches, memory loss, loss of strength, numbness, or tremors  SKIN: No itching, burning, rashes, or lesions   LYMPH NODES: No enlarged glands  ENDOCRINE: No heat or cold intolerance; No hair loss  MUSCULOSKELETAL: No joint pain or swelling; No muscle, back, or extremity pain  PSYCHIATRIC: No depression, anxiety, mood swings, or difficulty sleeping  HEME/LYMPH: No easy bruising, or bleeding gums  ALLERGY AND IMMUNOLOGIC: No hives or eczema    Vital Signs Last 24 Hrs  T(C): 37.1 (11 Apr 2019 11:22), Max: 37.5 (10 Apr 2019 23:05)  T(F): 98.8 (11 Apr 2019 11:22), Max: 99.5 (10 Apr 2019 23:05)  HR: 75 (11 Apr 2019 11:22) (65 - 75)  BP: 115/54 (11 Apr 2019 11:22) (104/44 - 135/62)  BP(mean): --  RR: 18 (11 Apr 2019 11:22) (16 - 18)  SpO2: 100% (11 Apr 2019 11:22) (99% - 100%)    PHYSICAL EXAM:  GENERAL: NAD, well-groomed, well-developed  HEAD:  Atraumatic, Normocephalic  EYES: EOMI, PERRLA, conjunctiva and sclera clear  ENMT: No tonsillar erythema, exudates, or enlargement; Moist mucous membranes, Good dentition, No lesions  NECK: Supple, No JVD, Normal thyroid  NERVOUS SYSTEM:  Alert & Oriented X3, Good concentration; Motor Strength 5/5 B/L upper and lower extremities; DTRs 2+ intact and symmetric  CHEST/LUNG: Clear to percussion bilaterally; No rales, rhonchi, wheezing, or rubs  HEART: Regular rate and rhythm; No murmurs, rubs, or gallops  ABDOMEN: Soft, Nontender, Nondistended; Bowel sounds present  EXTREMITIES:  2+ Peripheral Pulses, No clubbing, cyanosis, or edema  LYMPH: No lymphadenopathy noted  SKIN: No rashes or lesions    LABS:                        8.8    4.48  )-----------( 247      ( 10 Apr 2019 08:27 )             28.2               CAPILLARY BLOOD GLUCOSE        CULTURES:    HEMOGLOBIN A1C:    CHOLESTEROL:        RADIOLOGY & ADDITIONAL TESTS:
Patient is a 63y old  Female who presents with a chief complaint of back pain (09 Apr 2019 15:49)      INTERVAL HPI/OVERNIGHT EVENTS:  Patient seen in bed.   by bedside.  Pain persisting in back Ortho notes appreciated.  MEDICATIONS  (STANDING):  docusate sodium 100 milliGRAM(s) Oral daily  docusate sodium 100 milliGRAM(s) Oral three times a day  enoxaparin Injectable 40 milliGRAM(s) SubCutaneous daily  pantoprazole    Tablet 40 milliGRAM(s) Oral before breakfast  senna 2 Tablet(s) Oral at bedtime    MEDICATIONS  (PRN):  magnesium hydroxide Suspension 30 milliLiter(s) Oral daily PRN Constipation  morphine  - Injectable 2 milliGRAM(s) IV Push every 4 hours PRN Severe Pain (7 - 10)  oxyCODONE    5 mG/acetaminophen 325 mG 1 Tablet(s) Oral every 6 hours PRN Moderate Pain (4 - 6)      Allergies    penicillins (Hives)    Intolerances        REVIEW OF SYSTEMS:  CONSTITUTIONAL: No fever, weight loss, or fatigue  EYES: No eye pain, visual disturbances, or discharge  ENMT:  No difficulty hearing, tinnitus, vertigo; No sinus or throat pain  NECK: No pain or stiffness  BREASTS: No pain, masses, or nipple discharge  RESPIRATORY: No cough, wheezing, chills or hemoptysis; No shortness of breath  CARDIOVASCULAR: No chest pain, palpitations, dizziness, or leg swelling  GASTROINTESTINAL: No abdominal or epigastric pain. No nausea, vomiting, or hematemesis; No diarrhea or constipation. No melena or hematochezia.  GENITOURINARY: No dysuria, frequency, hematuria, or incontinence  NEUROLOGICAL: No headaches, memory loss, loss of strength, numbness, or tremors  SKIN: No itching, burning, rashes, or lesions   LYMPH NODES: No enlarged glands  ENDOCRINE: No heat or cold intolerance; No hair loss  MUSCULOSKELETAL: No joint pain or swelling; No muscle, back, or extremity pain  PSYCHIATRIC: No depression, anxiety, mood swings, or difficulty sleeping  HEME/LYMPH: No easy bruising, or bleeding gums  ALLERGY AND IMMUNOLOGIC: No hives or eczema    Vital Signs Last 24 Hrs  T(C): 36.9 (09 Apr 2019 11:20), Max: 37.4 (08 Apr 2019 18:22)  T(F): 98.4 (09 Apr 2019 11:20), Max: 99.3 (08 Apr 2019 18:22)  HR: 79 (09 Apr 2019 11:20) (70 - 80)  BP: 124/73 (09 Apr 2019 11:20) (110/54 - 126/61)  BP(mean): --  RR: 18 (09 Apr 2019 11:20) (16 - 20)  SpO2: 98% (09 Apr 2019 11:20) (98% - 100%)    PHYSICAL EXAM:  GENERAL: NAD, well-groomed, well-developed  HEAD:  Atraumatic, Normocephalic  EYES: EOMI, PERRLA, conjunctiva and sclera clear  ENMT: No tonsillar erythema, exudates, or enlargement; Moist mucous membranes, Good dentition, No lesions  NECK: Supple, No JVD, Normal thyroid  NERVOUS SYSTEM:  Alert & Oriented X3, Good concentration; Motor Strength 5/5 B/L upper and lower extremities; DTRs 2+ intact and symmetric  CHEST/LUNG: Clear to percussion bilaterally; No rales, rhonchi, wheezing, or rubs  HEART: Regular rate and rhythm; No murmurs, rubs, or gallops  ABDOMEN: Soft, Nontender, Nondistended; Bowel sounds present  EXTREMITIES:  2+ Peripheral Pulses, No clubbing, cyanosis, or edema  LYMPH: No lymphadenopathy noted  SKIN: No rashes or lesions    LABS:              CAPILLARY BLOOD GLUCOSE        CULTURES:    HEMOGLOBIN A1C:    CHOLESTEROL:        RADIOLOGY & ADDITIONAL TESTS:
Patient is a 63y old  Female who presents with a chief complaint of compression fracture spine (06 Apr 2019 12:23)      INTERVAL HPI/OVERNIGHT EVENTS:  No new complaints.  Pain in the hip persisting.  MEDICATIONS  (STANDING):  docusate sodium 100 milliGRAM(s) Oral daily  docusate sodium 100 milliGRAM(s) Oral three times a day  pantoprazole    Tablet 40 milliGRAM(s) Oral before breakfast  senna 2 Tablet(s) Oral at bedtime    MEDICATIONS  (PRN):  magnesium hydroxide Suspension 30 milliLiter(s) Oral daily PRN Constipation  morphine  - Injectable 2 milliGRAM(s) IV Push every 4 hours PRN Severe Pain (7 - 10)  oxyCODONE    5 mG/acetaminophen 325 mG 1 Tablet(s) Oral every 6 hours PRN Moderate Pain (4 - 6)      Allergies    penicillins (Hives)    Intolerances        REVIEW OF SYSTEMS:  CONSTITUTIONAL: No fever, weight loss, or fatigue  EYES: No eye pain, visual disturbances, or discharge  ENMT:  No difficulty hearing, tinnitus, vertigo; No sinus or throat pain  NECK: No pain or stiffness  BREASTS: No pain, masses, or nipple discharge  RESPIRATORY: No cough, wheezing, chills or hemoptysis; No shortness of breath  CARDIOVASCULAR: No chest pain, palpitations, dizziness, or leg swelling  GASTROINTESTINAL: No abdominal or epigastric pain. No nausea, vomiting, or hematemesis; No diarrhea or constipation. No melena or hematochezia.  GENITOURINARY: No dysuria, frequency, hematuria, or incontinence  NEUROLOGICAL: No headaches, memory loss, loss of strength, numbness, or tremors  SKIN: No itching, burning, rashes, or lesions   LYMPH NODES: No enlarged glands  ENDOCRINE: No heat or cold intolerance; No hair loss  MUSCULOSKELETAL: No joint pain or swelling; No muscle, back, or extremity pain  PSYCHIATRIC: No depression, anxiety, mood swings, or difficulty sleeping  HEME/LYMPH: No easy bruising, or bleeding gums  ALLERGY AND IMMUNOLOGIC: No hives or eczema    Vital Signs Last 24 Hrs  T(C): 37.9 (06 Apr 2019 17:28), Max: 37.9 (06 Apr 2019 17:28)  T(F): 100.3 (06 Apr 2019 17:28), Max: 100.3 (06 Apr 2019 17:28)  HR: 70 (06 Apr 2019 17:28) (70 - 89)  BP: 102/56 (06 Apr 2019 17:28) (102/56 - 139/72)  BP(mean): --  RR: 18 (06 Apr 2019 17:28) (18 - 18)  SpO2: 100% (06 Apr 2019 17:28) (99% - 100%)    PHYSICAL EXAM:  GENERAL: NAD, well-groomed, well-developed  HEAD:  Atraumatic, Normocephalic  EYES: EOMI, PERRLA, conjunctiva and sclera clear  ENMT: No tonsillar erythema, exudates, or enlargement; Moist mucous membranes, Good dentition, No lesions  NECK: Supple, No JVD, Normal thyroid  NERVOUS SYSTEM:  Alert & Oriented X3, Good concentration; Motor Strength 5/5 B/L upper and lower extremities; DTRs 2+ intact and symmetric  CHEST/LUNG: Clear to percussion bilaterally; No rales, rhonchi, wheezing, or rubs  HEART: Regular rate and rhythm; No murmurs, rubs, or gallops  ABDOMEN: Soft, Nontender, Nondistended; Bowel sounds present  EXTREMITIES:  2+ Peripheral Pulses, No clubbing, cyanosis, or edema  LYMPH: No lymphadenopathy noted  SKIN: No rashes or lesions    LABS:                        8.9    5.02  )-----------( 260      ( 06 Apr 2019 07:25 )             28.1               CAPILLARY BLOOD GLUCOSE        CULTURES:    HEMOGLOBIN A1C:    CHOLESTEROL:        RADIOLOGY & ADDITIONAL TESTS:
Patient is a 63y old  Female who presents with a chief complaint of compression fracture spine (06 Apr 2019 12:23)      INTERVAL HPI/OVERNIGHT EVENTS:  No new complaints.  Pathology report pending.  MEDICATIONS  (STANDING):  docusate sodium 100 milliGRAM(s) Oral daily  docusate sodium 100 milliGRAM(s) Oral three times a day  enoxaparin Injectable 40 milliGRAM(s) SubCutaneous daily  pantoprazole    Tablet 40 milliGRAM(s) Oral before breakfast  senna 2 Tablet(s) Oral at bedtime    MEDICATIONS  (PRN):  magnesium hydroxide Suspension 30 milliLiter(s) Oral daily PRN Constipation  morphine  - Injectable 2 milliGRAM(s) IV Push every 4 hours PRN Severe Pain (7 - 10)  oxyCODONE    5 mG/acetaminophen 325 mG 1 Tablet(s) Oral every 6 hours PRN Moderate Pain (4 - 6)      Allergies    penicillins (Hives)    Intolerances        REVIEW OF SYSTEMS:  CONSTITUTIONAL: No fever, weight loss, or fatigue  EYES: No eye pain, visual disturbances, or discharge  ENMT:  No difficulty hearing, tinnitus, vertigo; No sinus or throat pain  NECK: No pain or stiffness  BREASTS: No pain, masses, or nipple discharge  RESPIRATORY: No cough, wheezing, chills or hemoptysis; No shortness of breath  CARDIOVASCULAR: No chest pain, palpitations, dizziness, or leg swelling  GASTROINTESTINAL: No abdominal or epigastric pain. No nausea, vomiting, or hematemesis; No diarrhea or constipation. No melena or hematochezia.  GENITOURINARY: No dysuria, frequency, hematuria, or incontinence  NEUROLOGICAL: No headaches, memory loss, loss of strength, numbness, or tremors  SKIN: No itching, burning, rashes, or lesions   LYMPH NODES: No enlarged glands  ENDOCRINE: No heat or cold intolerance; No hair loss  MUSCULOSKELETAL: No joint pain or swelling; No muscle, back, or extremity pain  PSYCHIATRIC: No depression, anxiety, mood swings, or difficulty sleeping  HEME/LYMPH: No easy bruising, or bleeding gums  ALLERGY AND IMMUNOLOGIC: No hives or eczema    Vital Signs Last 24 Hrs  T(C): 36.7 (08 Apr 2019 11:54), Max: 37.1 (07 Apr 2019 17:30)  T(F): 98.1 (08 Apr 2019 11:54), Max: 98.8 (07 Apr 2019 17:30)  HR: 79 (08 Apr 2019 11:54) (74 - 79)  BP: 107/54 (08 Apr 2019 11:54) (107/54 - 127/60)  BP(mean): --  RR: 18 (08 Apr 2019 11:54) (16 - 18)  SpO2: 98% (08 Apr 2019 11:54) (97% - 99%)    PHYSICAL EXAM:  GENERAL: NAD, well-groomed, well-developed  HEAD:  Atraumatic, Normocephalic  EYES: EOMI, PERRLA, conjunctiva and sclera clear  ENMT: No tonsillar erythema, exudates, or enlargement; Moist mucous membranes, Good dentition, No lesions  NECK: Supple, No JVD, Normal thyroid  NERVOUS SYSTEM:  Alert & Oriented X3, Good concentration; Motor Strength 5/5 B/L upper and lower extremities; DTRs 2+ intact and symmetric  CHEST/LUNG: Clear to percussion bilaterally; No rales, rhonchi, wheezing, or rubs  HEART: Regular rate and rhythm; No murmurs, rubs, or gallops  ABDOMEN: Soft, Nontender, Nondistended; Bowel sounds present  EXTREMITIES:  2+ Peripheral Pulses, No clubbing, cyanosis, or edema  LYMPH: No lymphadenopathy noted  SKIN: No rashes or lesions    LABS:              CAPILLARY BLOOD GLUCOSE        CULTURES:    HEMOGLOBIN A1C:    CHOLESTEROL:        RADIOLOGY & ADDITIONAL TESTS:
Patient is a 63y old  Female who presents with a chief complaint of hip pain (04 Apr 2019 10:56)      INTERVAL HPI/OVERNIGHT EVENTS:  Patient seen.  Awaiting MRI and bone biopsy.  Oncology eval noted. Patient has no ?breast cancer  MEDICATIONS  (STANDING):  docusate sodium 100 milliGRAM(s) Oral daily  pantoprazole    Tablet 40 milliGRAM(s) Oral before breakfast    MEDICATIONS  (PRN):  morphine  - Injectable 2 milliGRAM(s) IV Push every 4 hours PRN Severe Pain (7 - 10)  oxyCODONE    5 mG/acetaminophen 325 mG 1 Tablet(s) Oral every 6 hours PRN Moderate Pain (4 - 6)      Allergies    penicillins (Hives)    Intolerances        REVIEW OF SYSTEMS:  CONSTITUTIONAL: No fever, weight loss, or fatigue  EYES: No eye pain, visual disturbances, or discharge  ENMT:  No difficulty hearing, tinnitus, vertigo; No sinus or throat pain  NECK: No pain or stiffness  BREASTS: No pain, masses, or nipple discharge  RESPIRATORY: No cough, wheezing, chills or hemoptysis; No shortness of breath  CARDIOVASCULAR: No chest pain, palpitations, dizziness, or leg swelling  GASTROINTESTINAL: No abdominal or epigastric pain. No nausea, vomiting, or hematemesis; No diarrhea or constipation. No melena or hematochezia.  GENITOURINARY: No dysuria, frequency, hematuria, or incontinence  NEUROLOGICAL: No headaches, memory loss, loss of strength, numbness, or tremors  SKIN: No itching, burning, rashes, or lesions   LYMPH NODES: No enlarged glands  ENDOCRINE: No heat or cold intolerance; No hair loss  MUSCULOSKELETAL: No joint pain or swelling; No muscle, back, or extremity pain  PSYCHIATRIC: No depression, anxiety, mood swings, or difficulty sleeping  HEME/LYMPH: No easy bruising, or bleeding gums  ALLERGY AND IMMUNOLOGIC: No hives or eczema    Vital Signs Last 24 Hrs  T(C): 37.2 (04 Apr 2019 14:20), Max: 37.8 (03 Apr 2019 23:26)  T(F): 99 (04 Apr 2019 14:20), Max: 100.1 (03 Apr 2019 23:26)  HR: 82 (04 Apr 2019 14:20) (79 - 83)  BP: 120/54 (04 Apr 2019 14:20) (120/54 - 144/75)  BP(mean): --  RR: 17 (04 Apr 2019 14:20) (17 - 18)  SpO2: 98% (04 Apr 2019 14:20) (96% - 100%)    PHYSICAL EXAM:  GENERAL: NAD, well-groomed, well-developed  HEAD:  Atraumatic, Normocephalic  EYES: EOMI, PERRLA, conjunctiva and sclera clear  ENMT: No tonsillar erythema, exudates, or enlargement; Moist mucous membranes, Good dentition, No lesions  NECK: Supple, No JVD, Normal thyroid  NERVOUS SYSTEM:  Alert & Oriented X3, Good concentration; Motor Strength 5/5 B/L upper and lower extremities; DTRs 2+ intact and symmetric  CHEST/LUNG: Clear to percussion bilaterally; No rales, rhonchi, wheezing, or rubs  HEART: Regular rate and rhythm; No murmurs, rubs, or gallops  ABDOMEN: Soft, Nontender, Nondistended; Bowel sounds present  EXTREMITIES:  2+ Peripheral Pulses, No clubbing, cyanosis, or edema  LYMPH: No lymphadenopathy noted  SKIN: No rashes or lesions    LABS:                        9.8    5.30  )-----------( 300      ( 04 Apr 2019 06:32 )             30.9     04-03    135  |  101  |  18  ----------------------------<  96  3.7   |  26  |  1.01    Ca    9.4      03 Apr 2019 03:42    TPro  6.8  /  Alb  x   /  TBili  x   /  DBili  x   /  AST  x   /  ALT  x   /  AlkPhos  x   04-04    PT/INR - ( 04 Apr 2019 06:32 )   PT: 15.6 sec;   INR: 1.38 ratio         PTT - ( 04 Apr 2019 06:32 )  PTT:37.9 sec    CAPILLARY BLOOD GLUCOSE        CULTURES:    HEMOGLOBIN A1C:    CHOLESTEROL:        RADIOLOGY & ADDITIONAL TESTS:
Patient s/p right nodule thyroid lobe FNA with local anesthesia     Operators: EFRAIN Street MD    Findings:  4 x 25g needle passes of thyroid tissue obtained and sent to lab for analysis.  Hemostasis achieved.  Pt tolerated procedure well.  VSS    Complications: None.    Blood loss:  none        ASSESSMENT:  64 yo female s/p right nodule thyroid lobe FNA with local anesthesia.  Pt has a h/o breast cancer and pathological spine fracture.  Pt also s/p bone marrow biopsy and lytic bone lesion biopsy done on 4/5    PLAN:  -f/u final thyroid nodule and bone marrow/bone lesion results  -lovenox to be resumed tomorrow
Pt was seen and examined while in bed. Pt states she is having upper and lower back pain and pain down both her legs L>R. Pt denies any numbness/tingling in B/L LE. No urinary or bowel incontinence.     PE: Back: (+)TTP along T/Lspine. no erythema/swelling (+)Skin intact.   B/L LE: (+)pain in Lower back with SLR of the LLE, not in the RLE. good strength in B/L LE. (+)motor/sensory intact in B/L LE. NVI    A/P: s/p Pathologic sacral and T1 compression fracture    -WBAT  -No acute orthopedic intervention at this time  -Oncology eval for source of lesion  -Analgesia  -Ask Dr. Melissa (ortho spine) to see patient while in hospital   -Please contact with further questions
[id number]    Patient is a 63y old  Female who presents with a chief complaint of hip pain (2019 10:56)      MEDICATIONS  (STANDING):  docusate sodium 100 milliGRAM(s) Oral daily  pantoprazole    Tablet 40 milliGRAM(s) Oral before breakfast    MEDICATIONS  (PRN):  morphine  - Injectable 2 milliGRAM(s) IV Push every 4 hours PRN Severe Pain (7 - 10)  oxyCODONE    5 mG/acetaminophen 325 mG 1 Tablet(s) Oral every 6 hours PRN Moderate Pain (4 - 6)      Daily     Daily Weight in k.2 (2019 23:26)    REVIEW OF SYSTEMS:    CONSTITUTIONAL: weakness, of L lower leg EYES/ENT: No visual changes;  No vertigo or throat pain   NECK: No pain or stiffness  RESPIRATORY: No cough, wheezing, hemoptysis; No shortness of breath  CARDIOVASCULAR: No chest pain or palpitations  GASTROINTESTINAL: No abdominal or epigastric pain. No nausea, vomiting, or hematemesis; No diarrhea or constipation. No melena or hematochezia.  GENITOURINARY: No dysuria, frequency or hematuria  NEUROLOGICAL:       Vital Signs Last 24 Hrs  T(C): 37 (2019 18:30), Max: 37.8 (2019 23:26)  T(F): 98.6 (2019 18:30), Max: 100.1 (2019 23:26)  HR: 82 (2019 18:30) (79 - 83)  BP: 102/60 (2019 18:30) (102/60 - 144/75)  BP(mean): --  RR: 16 (2019 18:30) (16 - 18)  SpO2: 99% (2019 18:30) (98% - 100%)    PHYSICAL EXAM:  GENERAL: NAD, well-developed  HEAD:  Atraumatic, Normocephalic  EYES: pale  conjunctiva and sclera clear  NECK: Supple, No JVD  BREAST:   CHEST/LUNG: Clear   HEART: Regular rate and rhythm; No murmurs, rubs, or gallops  ABDOMEN: Soft, Nontender, Nondistended; Bowel sounds present  EXTREMITIES:  2+ Peripheral Pulses, No clubbing, cyanosis, or edema  PSYCH: AAOx3  NEUROLOGY: non-focal L lower leg 3/5      LABS:  CBC Full  -  ( 2019 06:32 )  WBC Count : 5.30 K/uL  RBC Count : 3.61 M/uL  Hemoglobin : 9.8 g/dL  Hematocrit : 30.9 %  Platelet Count - Automated : 300 K/uL  Mean Cell Volume : 85.6 fl  Mean Cell Hemoglobin : 27.1 pg  Mean Cell Hemoglobin Concentration : 31.7 gm/dL  Auto Neutrophil # : x  Auto Lymphocyte # : x  Auto Monocyte # : x  Auto Eosinophil # : x  Auto Basophil # : x  Auto Neutrophil % : x  Auto Lymphocyte % : x  Auto Monocyte % : x  Auto Eosinophil % : x  Auto Basophil % : x        135  |  101  |  18  ----------------------------<  96  3.7   |  26  |  1.01    Ca    9.4      2019 03:42    TPro  6.8  /  Alb  x   /  TBili  x   /  DBili  x   /  AST  x   /  ALT  x   /  AlkPhos  x   -    PT/INR - ( 2019 06:32 )   PT: 15.6 sec;   INR: 1.38 ratio         PTT - ( 2019 06:32 )  PTT:37.9 sec      CULTURES:    RADIOLOGY & ADDITIONAL STUDIES:
[id number]    Patient is a 63y old  Female who presents with a chief complaint of back pain (2019 15:49)      MEDICATIONS  (STANDING):  docusate sodium 100 milliGRAM(s) Oral three times a day  enoxaparin Injectable 40 milliGRAM(s) SubCutaneous daily  pantoprazole    Tablet 40 milliGRAM(s) Oral before breakfast  senna 2 Tablet(s) Oral at bedtime    MEDICATIONS  (PRN):  magnesium hydroxide Suspension 30 milliLiter(s) Oral daily PRN Constipation  oxyCODONE    5 mG/acetaminophen 325 mG 2 Tablet(s) Oral every 4 hours PRN Moderate Pain (4 - 6)      Daily     Daily Weight in k.1 (15 Apr 2019 05:29)    REVIEW OF SYSTEMS:    CONSTITUTIONAL: No, fevers or chills  EYES/ENT: No visual changes;  No vertigo or throat pain   NECK: No pain or stiffness  RESPIRATORY: No cough, wheezing, hemoptysis; No shortness of breath  CARDIOVASCULAR: No chest pain or palpitations  GASTROINTESTINAL: No abdominal or epigastric pain. No nausea, vomiting, or hematemesis; No diarrhea or constipation. No melena or hematochezia.  GENITOURINARY: No dysuria, frequency or hematuria  NEUROLOGICAL: No weakness of the L lower leg       Vital Signs Last 24 Hrs  T(C): 37 (15 Apr 2019 18:14), Max: 37.1 (15 Apr 2019 00:16)  T(F): 98.6 (15 Apr 2019 18:14), Max: 98.7 (15 Apr 2019 00:16)  HR: 70 (15 Apr 2019 18:14) (70 - 654)  BP: 108/63 (15 Apr 2019 18:14) (100/54 - 134/67)  BP(mean): --  RR: 18 (15 Apr 2019 18:14) (18 - 18)  SpO2: 100% (15 Apr 2019 18:14) (95% - 100%)    PHYSICAL EXAM:  GENERAL: NAD, well-developed  HEAD:  Atraumatic, Normocephalic  EYES: EOMI, PERRLA, conjunctiva and sclera clear  NECK: Supple, No JVD  BREAST:   CHEST/LUNG: Clear to auscultation bilaterally; No wheeze  HEART: Regular rate and rhythm; No murmurs, rubs, or gallops  ABDOMEN: Soft, Nontender, Nondistended; Bowel sounds present  EXTREMITIES:  2+ Peripheral Pulses, No clubbing, cyanosis, or edema  PSYCH: AAOx3  NEUROLOGY: non-focal  SKIN: No rashes or lesions    LABS:                CULTURES:    RADIOLOGY & ADDITIONAL STUDIES:
Spoke with Dr Griffith: Pt needs the MRI of the C-spine and T-spine.  SPoke with pt: REfusing to have this test done without sedation. Unable to tolerate lying flat because of pain. Will only have the test done if she has anesthesia.  Discussed with Garrett puja for Dr. Medina. REcommend arranging MRI under sedation. WIll require coordination with MRI tech and anesthesia. Pt will need to be NPO for anesthesia.
[id number]    Patient is a 63y old  Female who presents with a chief complaint of back pain (09 Apr 2019 15:49)      MEDICATIONS  (STANDING):  docusate sodium 100 milliGRAM(s) Oral daily  docusate sodium 100 milliGRAM(s) Oral three times a day  enoxaparin Injectable 40 milliGRAM(s) SubCutaneous daily  pantoprazole    Tablet 40 milliGRAM(s) Oral before breakfast  senna 2 Tablet(s) Oral at bedtime    MEDICATIONS  (PRN):  magnesium hydroxide Suspension 30 milliLiter(s) Oral daily PRN Constipation  morphine  - Injectable 2 milliGRAM(s) IV Push every 4 hours PRN Severe Pain (7 - 10)  oxyCODONE    5 mG/acetaminophen 325 mG 1 Tablet(s) Oral every 6 hours PRN Moderate Pain (4 - 6)      Daily     Daily     REVIEW OF SYSTEMS:    CONSTITUTIONAL: No fevers or chills  EYES/ENT: No visual changes;  No vertigo or throat pain   NECK: No pain or stiffness  RESPIRATORY: No cough, wheezing, hemoptysis; No shortness of breath  CARDIOVASCULAR: No chest pain or palpitations  GASTROINTESTINAL: No abdominal or epigastric pain. No nausea, vomiting, or hematemesis; No diarrhea or constipation. No melena or hematochezia.  GENITOURINARY: No dysuria, frequency or hematuria  NEUROLOGICAL:  weakness of the L lower leg       Vital Signs Last 24 Hrs  T(C): 37.6 (09 Apr 2019 18:44), Max: 37.6 (09 Apr 2019 18:44)  T(F): 99.7 (09 Apr 2019 18:44), Max: 99.7 (09 Apr 2019 18:44)  HR: 69 (09 Apr 2019 18:44) (69 - 79)  BP: 104/62 (09 Apr 2019 18:44) (104/62 - 126/61)  BP(mean): --  RR: 16 (09 Apr 2019 18:44) (16 - 18)  SpO2: 100% (09 Apr 2019 18:44) (98% - 100%)    PHYSICAL EXAM:  GENERAL: NAD, well-developed  HEAD:  Atraumatic, Normocephalic  EYES: pale  conjunctiva and sclera clear  NECK: Supple, No JVD  BREAST:  scar of the L breast , no mass of both breasts no axillary nodes   CHEST/LUNG: Clear  HEART: Regular rate and rhythm;  ABDOMEN: Soft, Nontender, Nondistended; Bowel sounds present  EXTREMITIES:  2+ Peripheral Pulses, No clubbing, cyanosis, or edema    NEUROLOGY: non-focal  SKIN: No rashes or lesions    LABS:                CULTURES:    RADIOLOGY & ADDITIONAL STUDIES:
[id number]    Patient is a 63y old  Female who presents with a chief complaint of back pain (2019 15:49)      MEDICATIONS  (STANDING):  docusate sodium 100 milliGRAM(s) Oral three times a day  enoxaparin Injectable 40 milliGRAM(s) SubCutaneous daily  pantoprazole    Tablet 40 milliGRAM(s) Oral before breakfast  senna 2 Tablet(s) Oral at bedtime    MEDICATIONS  (PRN):  magnesium hydroxide Suspension 30 milliLiter(s) Oral daily PRN Constipation  oxyCODONE    5 mG/acetaminophen 325 mG 2 Tablet(s) Oral every 4 hours PRN Moderate Pain (4 - 6)      Daily     Daily Weight in k.3 (2019 06:16)    REVIEW OF SYSTEMS:    CONSTITUTIONAL: No fevers or chills  EYES/ENT: No visual changes;  No vertigo or throat pain   NECK: No pain or stiffness  RESPIRATORY: No cough, wheezing, hemoptysis; No shortness of breath  CARDIOVASCULAR: No chest pain or palpitations  GASTROINTESTINAL: No abdominal or epigastric pain. No nausea, vomiting, or hematemesis; No diarrhea or constipation. No melena or hematochezia.  GENITOURINARY: No dysuria, frequency or hematuria  NEUROLOGICAL: weakness of the L leg   SKIN: No itching, rashes    Vital Signs Last 24 Hrs  T(C): 36.3 (2019 10:35), Max: 37.1 (2019 11:22)  T(F): 97.3 (2019 10:35), Max: 98.8 (2019 11:22)  HR: 66 (2019 10:35) (66 - 75)  BP: 104/66 (2019 10:35) (101/62 - 123/61)  BP(mean): --  RR: 17 (2019 10:35) (17 - 18)  SpO2: 100% (2019 10:35) (98% - 100%)    PHYSICAL EXAM:  GENERAL: NAD, well-developed  HEAD:  Atraumatic, Normocephalic  EYES: pale,  conjunctiva and sclera clear  NECK: Supple, No JVD  BREAST:   CHEST/LUNG: Clear   HEART: Regular rate and rhythm; No murmurs, rubs, or gallops  ABDOMEN: Soft, Nontender, Nondistended; Bowel sounds present  EXTREMITIES:  2+ Peripheral Pulses, No clubbing, cyanosis, or edema  PSYCH: AAOx3  NEUROLOGY: non-focal no change   SKIN:     LABS:  CBC Full  -  ( 2019 08:01 )  WBC Count : 5.26 K/uL  RBC Count : 3.64 M/uL  Hemoglobin : 9.9 g/dL  Hematocrit : 31.8 %  Platelet Count - Automated : 338 K/uL  Mean Cell Volume : 87.4 fl  Mean Cell Hemoglobin : 27.2 pg  Mean Cell Hemoglobin Concentration : 31.1 gm/dL  Auto Neutrophil # : x  Auto Lymphocyte # : x  Auto Monocyte # : x  Auto Eosinophil # : x  Auto Basophil # : x  Auto Neutrophil % : x  Auto Lymphocyte % : x  Auto Monocyte % : x  Auto Eosinophil % : x  Auto Basophil % : x    04-12    138  |  102  |  8   ----------------------------<  105<H>  3.5   |  27  |  0.72    Ca    9.3      2019 08:01            CULTURES:    RADIOLOGY & ADDITIONAL STUDIES:
Patient is a 63y old  Female who presents with a chief complaint of hip pain (04 Apr 2019 10:56)      INTERVAL HPI/OVERNIGHT EVENTS:    MEDICATIONS  (STANDING):  docusate sodium 100 milliGRAM(s) Oral daily  pantoprazole    Tablet 40 milliGRAM(s) Oral before breakfast    MEDICATIONS  (PRN):  morphine  - Injectable 2 milliGRAM(s) IV Push every 4 hours PRN Severe Pain (7 - 10)  oxyCODONE    5 mG/acetaminophen 325 mG 1 Tablet(s) Oral every 6 hours PRN Moderate Pain (4 - 6)      Allergies    penicillins (Hives)    Intolerances        REVIEW OF SYSTEMS:  CONSTITUTIONAL: No fever, weight loss, or fatigue  EYES: No eye pain, visual disturbances, or discharge  ENMT:  No difficulty hearing, tinnitus, vertigo; No sinus or throat pain  NECK: No pain or stiffness  BREASTS: No pain, masses, or nipple discharge  RESPIRATORY: No cough, wheezing, chills or hemoptysis; No shortness of breath  CARDIOVASCULAR: No chest pain, palpitations, dizziness, or leg swelling  GASTROINTESTINAL: No abdominal or epigastric pain. No nausea, vomiting, or hematemesis; No diarrhea or constipation. No melena or hematochezia.  GENITOURINARY: No dysuria, frequency, hematuria, or incontinence  NEUROLOGICAL: No headaches, memory loss, loss of strength, numbness, or tremors  SKIN: No itching, burning, rashes, or lesions   LYMPH NODES: No enlarged glands  ENDOCRINE: No heat or cold intolerance; No hair loss  MUSCULOSKELETAL: No joint pain or swelling; No muscle, back, or extremity pain  PSYCHIATRIC: No depression, anxiety, mood swings, or difficulty sleeping  HEME/LYMPH: No easy bruising, or bleeding gums  ALLERGY AND IMMUNOLOGIC: No hives or eczema    Vital Signs Last 24 Hrs  T(C): 36.7 (05 Apr 2019 19:46), Max: 37.9 (04 Apr 2019 23:47)  T(F): 98.1 (05 Apr 2019 19:46), Max: 100.3 (04 Apr 2019 23:47)  HR: 77 (05 Apr 2019 19:46) (77 - 82)  BP: 139/72 (05 Apr 2019 19:46) (110/57 - 139/72)  BP(mean): --  RR: 18 (05 Apr 2019 19:46) (16 - 18)  SpO2: 100% (05 Apr 2019 19:46) (99% - 100%)    PHYSICAL EXAM:  GENERAL: NAD, well-groomed, well-developed  HEAD:  Atraumatic, Normocephalic  EYES: EOMI, PERRLA, conjunctiva and sclera clear  ENMT: No tonsillar erythema, exudates, or enlargement; Moist mucous membranes, Good dentition, No lesions  NECK: Supple, No JVD, Normal thyroid  NERVOUS SYSTEM:  Alert & Oriented X3, Good concentration; Motor Strength 5/5 B/L upper and lower extremities; DTRs 2+ intact and symmetric  CHEST/LUNG: Clear to percussion bilaterally; No rales, rhonchi, wheezing, or rubs  HEART: Regular rate and rhythm; No murmurs, rubs, or gallops  ABDOMEN: Soft, Nontender, Nondistended; Bowel sounds present  EXTREMITIES:  2+ Peripheral Pulses, No clubbing, cyanosis, or edema  LYMPH: No lymphadenopathy noted  SKIN: No rashes or lesions    LABS:                        9.8    5.30  )-----------( 300      ( 04 Apr 2019 06:32 )             30.9         TPro  6.8  /  Alb  2.7<L>  /  TBili  x   /  DBili  x   /  AST  x   /  ALT  x   /  AlkPhos  x   04-04    PT/INR - ( 04 Apr 2019 06:32 )   PT: 15.6 sec;   INR: 1.38 ratio         PTT - ( 04 Apr 2019 06:32 )  PTT:37.9 sec    CAPILLARY BLOOD GLUCOSE        CULTURES:    HEMOGLOBIN A1C:    CHOLESTEROL:        RADIOLOGY & ADDITIONAL TESTS:  < from: MR Lumbar Spine w/ IV Cont (04.05.19 @ 18:11) >      IMPRESSION: Acute S1 superior endplate fracture extending into the   vertebral body. Diffuse osseous metastasis.      < end of copied text >
Patient is a 63y old  Female who presents with a chief complaint of compression fracture spine (06 Apr 2019 12:23)      INTERVAL HPI/OVERNIGHT EVENTS:  No new complaints.  Pain persisting.  pathology awaited  MEDICATIONS  (STANDING):  docusate sodium 100 milliGRAM(s) Oral daily  docusate sodium 100 milliGRAM(s) Oral three times a day  enoxaparin Injectable 40 milliGRAM(s) SubCutaneous daily  pantoprazole    Tablet 40 milliGRAM(s) Oral before breakfast  senna 2 Tablet(s) Oral at bedtime    MEDICATIONS  (PRN):  magnesium hydroxide Suspension 30 milliLiter(s) Oral daily PRN Constipation  morphine  - Injectable 2 milliGRAM(s) IV Push every 4 hours PRN Severe Pain (7 - 10)  oxyCODONE    5 mG/acetaminophen 325 mG 1 Tablet(s) Oral every 6 hours PRN Moderate Pain (4 - 6)      Allergies    penicillins (Hives)    Intolerances        REVIEW OF SYSTEMS:  CONSTITUTIONAL: No fever, weight loss, or fatigue  EYES: No eye pain, visual disturbances, or discharge  ENMT:  No difficulty hearing, tinnitus, vertigo; No sinus or throat pain  NECK: No pain or stiffness  BREASTS: No pain, masses, or nipple discharge  RESPIRATORY: No cough, wheezing, chills or hemoptysis; No shortness of breath  CARDIOVASCULAR: No chest pain, palpitations, dizziness, or leg swelling  GASTROINTESTINAL: No abdominal or epigastric pain. No nausea, vomiting, or hematemesis; No diarrhea or constipation. No melena or hematochezia.  GENITOURINARY: No dysuria, frequency, hematuria, or incontinence  NEUROLOGICAL: No headaches, memory loss, loss of strength, numbness, or tremors  SKIN: No itching, burning, rashes, or lesions   LYMPH NODES: No enlarged glands  ENDOCRINE: No heat or cold intolerance; No hair loss  MUSCULOSKELETAL: No joint pain or swelling; No muscle, back, or extremity pain  PSYCHIATRIC: No depression, anxiety, mood swings, or difficulty sleeping  HEME/LYMPH: No easy bruising, or bleeding gums  ALLERGY AND IMMUNOLOGIC: No hives or eczema    Vital Signs Last 24 Hrs  T(C): 36.7 (07 Apr 2019 13:25), Max: 37.9 (06 Apr 2019 17:28)  T(F): 98 (07 Apr 2019 13:25), Max: 100.3 (06 Apr 2019 17:28)  HR: 99 (07 Apr 2019 13:25) (70 - 99)  BP: 120/61 (07 Apr 2019 13:25) (102/56 - 121/65)  BP(mean): --  RR: 16 (07 Apr 2019 13:25) (16 - 18)  SpO2: 97% (07 Apr 2019 13:25) (97% - 100%)    PHYSICAL EXAM:  GENERAL: NAD, well-groomed, well-developed  HEAD:  Atraumatic, Normocephalic  EYES: EOMI, PERRLA, conjunctiva and sclera clear  ENMT: No tonsillar erythema, exudates, or enlargement; Moist mucous membranes, Good dentition, No lesions  NECK: Supple, No JVD, Normal thyroid  NERVOUS SYSTEM:  Alert & Oriented X3, Good concentration; Motor Strength 5/5 B/L upper and lower extremities; DTRs 2+ intact and symmetric  CHEST/LUNG: Clear to percussion bilaterally; No rales, rhonchi, wheezing, or rubs  HEART: Regular rate and rhythm; No murmurs, rubs, or gallops  ABDOMEN: Soft, Nontender, Nondistended; Bowel sounds present  EXTREMITIES:  2+ Peripheral Pulses, No clubbing, cyanosis, or edema  LYMPH: No lymphadenopathy noted  SKIN: No rashes or lesions    LABS:                        8.9    5.02  )-----------( 260      ( 06 Apr 2019 07:25 )             28.1               CAPILLARY BLOOD GLUCOSE        CULTURES:    HEMOGLOBIN A1C:    CHOLESTEROL:        RADIOLOGY & ADDITIONAL TESTS:

## 2019-04-15 NOTE — PROGRESS NOTE ADULT - NSHPATTENDINGPLANDISCUSS_GEN_ALL_CORE
family and patient
patient
patient and 
patient and family
patient and family ,
patient and family at bedside
 and patient
patient
Patient and family.

## 2019-04-15 NOTE — PROGRESS NOTE ADULT - PROBLEM SELECTOR PLAN 4
Nutrition consult.

## 2019-04-15 NOTE — PROGRESS NOTE ADULT - PROVIDER SPECIALTY LIST ADULT
Heme/Onc
Internal Medicine
Intervent Radiology
Orthopedics
Internal Medicine

## 2019-04-15 NOTE — PROGRESS NOTE ADULT - PROBLEM SELECTOR PROBLEM 1
Closed fracture of sacrum, unspecified portion of sacrum, initial encounter
Osseous metastasis
Thyroid nodule
Closed fracture of sacrum, unspecified portion of sacrum, initial encounter
Closed fracture of sacrum, unspecified portion of sacrum, initial encounter

## 2019-04-15 NOTE — PROGRESS NOTE ADULT - PROBLEM SELECTOR PLAN 2
as above  ? No breast cancer
as above  ? No breast cancer
MRI of lumbo sacral spine for which patient needs   Xanax 30 min prior procedure
as above  ? No breast cancer
to discuss report with pathologist in am
as above  ? No breast cancer

## 2019-04-16 VITALS
HEART RATE: 68 BPM | OXYGEN SATURATION: 99 % | TEMPERATURE: 98 F | RESPIRATION RATE: 18 BRPM | SYSTOLIC BLOOD PRESSURE: 106 MMHG | DIASTOLIC BLOOD PRESSURE: 57 MMHG

## 2019-04-16 LAB — NON-GYNECOLOGICAL CYTOLOGY STUDY: SIGNIFICANT CHANGE UP

## 2019-04-16 RX ORDER — DOCUSATE SODIUM 100 MG
1 CAPSULE ORAL
Qty: 0 | Refills: 0 | COMMUNITY
Start: 2019-04-16

## 2019-04-16 RX ORDER — DOCUSATE SODIUM 100 MG
1 CAPSULE ORAL
Qty: 90 | Refills: 0
Start: 2019-04-16 | End: 2019-05-15

## 2019-04-16 RX ORDER — TRAMADOL HYDROCHLORIDE 50 MG/1
1 TABLET ORAL
Qty: 18 | Refills: 0
Start: 2019-04-16 | End: 2019-04-18

## 2019-04-16 RX ORDER — SENNA PLUS 8.6 MG/1
2 TABLET ORAL
Qty: 60 | Refills: 0
Start: 2019-04-16 | End: 2019-05-15

## 2019-04-16 RX ORDER — PANTOPRAZOLE SODIUM 20 MG/1
1 TABLET, DELAYED RELEASE ORAL
Qty: 30 | Refills: 0
Start: 2019-04-16 | End: 2019-05-15

## 2019-04-16 RX ORDER — TRAMADOL HYDROCHLORIDE 50 MG/1
1 TABLET ORAL
Qty: 0 | Refills: 0 | COMMUNITY

## 2019-04-16 RX ORDER — SENNA PLUS 8.6 MG/1
2 TABLET ORAL
Qty: 0 | Refills: 0 | COMMUNITY
Start: 2019-04-16

## 2019-04-16 RX ADMIN — Medication 100 MILLIGRAM(S): at 13:36

## 2019-04-16 RX ADMIN — OXYCODONE AND ACETAMINOPHEN 2 TABLET(S): 5; 325 TABLET ORAL at 11:24

## 2019-04-16 RX ADMIN — PANTOPRAZOLE SODIUM 40 MILLIGRAM(S): 20 TABLET, DELAYED RELEASE ORAL at 08:09

## 2019-04-16 RX ADMIN — ENOXAPARIN SODIUM 40 MILLIGRAM(S): 100 INJECTION SUBCUTANEOUS at 11:40

## 2019-04-16 RX ADMIN — OXYCODONE AND ACETAMINOPHEN 2 TABLET(S): 5; 325 TABLET ORAL at 12:24

## 2019-04-16 RX ADMIN — OXYCODONE AND ACETAMINOPHEN 2 TABLET(S): 5; 325 TABLET ORAL at 07:54

## 2019-04-16 RX ADMIN — OXYCODONE AND ACETAMINOPHEN 2 TABLET(S): 5; 325 TABLET ORAL at 01:37

## 2019-04-16 RX ADMIN — OXYCODONE AND ACETAMINOPHEN 2 TABLET(S): 5; 325 TABLET ORAL at 02:37

## 2019-04-16 RX ADMIN — OXYCODONE AND ACETAMINOPHEN 2 TABLET(S): 5; 325 TABLET ORAL at 06:54

## 2019-04-16 RX ADMIN — Medication 100 MILLIGRAM(S): at 05:28

## 2019-04-16 NOTE — DISCHARGE NOTE PROVIDER - NSDCCPCAREPLAN_GEN_ALL_CORE_FT
PRINCIPAL DISCHARGE DIAGNOSIS  Diagnosis: Closed fracture of sacrum, unspecified portion of sacrum, initial encounter  Assessment and Plan of Treatment: F/u with Dr. Cyr outpatient in 7-10 days and Dr. Alexander in 7-10 days      SECONDARY DISCHARGE DIAGNOSES  Diagnosis: Thyroid nodule  Assessment and Plan of Treatment: Thyroid nodule    Diagnosis: Osseous metastasis  Assessment and Plan of Treatment: Osseous metastasis

## 2019-04-16 NOTE — DISCHARGE NOTE NURSING/CASE MANAGEMENT/SOCIAL WORK - NSDCDPATPORTLINK_GEN_ALL_CORE
You can access the [facility], offered by [facility], by registering with the following website: [email or web address]

## 2019-04-16 NOTE — DISCHARGE NOTE PROVIDER - CARE PROVIDER_API CALL
Torsten Cyr)  Internal Medicine  [address] [place] [address]  Phone: [phone]  Fax: [phone]  Follow Up Time:     Helena Alexander)  [facility]  [address] [place] [address]  Phone: [phone]  Fax: [phone]  Follow Up Time:

## 2019-04-16 NOTE — DISCHARGE NOTE PROVIDER - HOSPITAL COURSE
HPI:    Patient presents to the ED for left hip pain.  Patient reports pain from left lower back radiating to left hip and groin, occasionally down left leg for the last 2 days.  denies any fall or injury.  Has history of left hip replacement.  Denies any saddle numbness, leg weakness, or changes in bowel or bladder. 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 HPI:    Patient presents to the ED for left hip pain.  Patient reports pain from left lower back radiating to left hip and groin, occasionally down left leg for the last 2 days.  denies any fall or injury.  Has history of left hip replacement.  Denies any saddle numbness, leg weakness, or changes in bowel or bladder. 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 Patient to follow up outpatient with Dr. Cyr and Dr. Alexander.

## 2019-04-18 LAB — CHROM ANALY OVERALL INTERP SPEC-IMP: SIGNIFICANT CHANGE UP

## 2019-04-23 DIAGNOSIS — Z96.642 PRESENCE OF LEFT ARTIFICIAL HIP JOINT: ICD-10-CM

## 2019-04-23 DIAGNOSIS — G47.33 OBSTRUCTIVE SLEEP APNEA (ADULT) (PEDIATRIC): ICD-10-CM

## 2019-04-23 DIAGNOSIS — C79.51 SECONDARY MALIGNANT NEOPLASM OF BONE: ICD-10-CM

## 2019-04-23 DIAGNOSIS — C79.52 SECONDARY MALIGNANT NEOPLASM OF BONE MARROW: ICD-10-CM

## 2019-04-23 DIAGNOSIS — E04.1 NONTOXIC SINGLE THYROID NODULE: ICD-10-CM

## 2019-04-23 DIAGNOSIS — D63.0 ANEMIA IN NEOPLASTIC DISEASE: ICD-10-CM

## 2019-04-23 DIAGNOSIS — E66.01 MORBID (SEVERE) OBESITY DUE TO EXCESS CALORIES: ICD-10-CM

## 2019-04-23 DIAGNOSIS — I10 ESSENTIAL (PRIMARY) HYPERTENSION: ICD-10-CM

## 2019-04-23 DIAGNOSIS — M84.58XA PATHOLOGICAL FRACTURE IN NEOPLASTIC DISEASE, OTHER SPECIFIED SITE, INITIAL ENCOUNTER FOR FRACTURE: ICD-10-CM
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
## 2019-04-23 DIAGNOSIS — Z85.3 PERSONAL HISTORY OF MALIGNANT NEOPLASM OF BREAST: ICD-10-CM
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
## 2019-04-23 DIAGNOSIS — M54.9 DORSALGIA, UNSPECIFIED: ICD-10-CM

## 2019-09-24 NOTE — OCCUPATIONAL THERAPY INITIAL EVALUATION ADULT - PATIENT PROFILE REVIEW, REHAB EVAL
"[facility]    Palliative Care Consultation--Inpatient  Admission Date: 9/23/2019   Visit Date: September 24, 2019  PCP: Qasmi Porter   Requested by: Saul Major MD      HISTORY of PRESENT ILLNESS:  Josiane Zurita is a 67 year old year old female with a history of chronic lymphedema, morbid obesity s/p gastric bypass at an unknown date in time, diastolic heart failure, depression, afib on anticoag, and a recent PE.  This is her fourth hospitalization since March of this year:  The first was March 15-18 for the PE followed by discharge home followed by her second hospitalization 3/23-26 for fatigue and weakness.  From there she was discharged to TCU for rehab where she remained only a few days--till 4/1.  She was hospitalized again 5/22-29 for sepsis who was admitted with o UTI, and was discharged again to TCU where she remained for 4 full weeks.  It appears that this TCU stay was followed by home care.  Nonetheless, she made calls to her PCP complaining of worsening lymphedema.  On 9/23 she appears to have run out of options for obtaining medical care (she no longer fits into her car) and called 911 for her complaints of weakness and fatigue.  EMS personnel reported to the ED that her home has the appearance of hoarding.  She was referred to Palliative Care for exploration of goals of care.      ASSESSMENT & PLAN:    Morbid obesity with a h/o past gastric bypass surgery  Physical debility  > see Goals of care    Depressed mood.  States she has \"no one who needs me\" and that antidepressants haven't helped in the past, but is willing to try one again  > No h/o previous antidepressant use within this EMR; will therefore initiate Sertraline at 25 mg daily    Advance Care Planning:  > Understanding of condition:  Recognizes she may be unable to return to independent living  > Decisional capacity:  intact  > Code status:  DNR / DNI; per face to face discussion with patient today, reiterated x 3  > " yes "Health Care Directive: NO  > POLST:  No    Goals of Care:  > DNR/DNI (she has some recognition of her heart disease, but poor understanding of the rationale behind use of diuretics)  > She recognizes she may need long term care at this point; expressed no desire either to return to her home or her apartment  > States she will try working with PT/OT during this admission \"if I have to\"      Thank you for the opportunity to be of service to this patient and family.      Emanuel Sorensen) Jean Claude CNP  Palliative Care  [facility] cell:  [phone]     Face to face:   1120 - 1200, 40 min, > 50% spent discussing mood, goals, aspirations, prognosis.   Non face to face:   [id number] and 1179-5411 and 1350 - 1420, 95 min, > 50% spent looking for past h/o depression and/or antidepressant therapies; and coordinating care with  attending, nursing, Care Transitions, OT and ST.       = = = = = = = = = = = = = = = =      PROBLEM LIST  Patient Active Problem List   Diagnosis     Morbid obesity (H)     Mild intermittent asthma     CARDIOVASCULAR SCREENING; LDL GOAL LESS THAN 160     Type 2 diabetes mellitus without complication, without long-term current use of insulin (H)     Lymphedema of both lower extremities     Atrial fibrillation with RVR (H)     Pulmonary embolism (H)     Generalized weakness     Atrial fibrillation (H)     Anemia, iron deficiency     Primary osteoarthritis of both hips     Acute cystitis without hematuria     Venous stasis dermatitis of both lower extremities     Moderate major depression (H)     Unable to function independently     UTI (urinary tract infection)       PAST MEDICAL HISTORY  Past Medical History:   Diagnosis Date     Acute kidney injury (H) 5/23/2019     Acute respiratory failure with hypercapnia (H) 5/23/2019     Cellulitis 03/2017    RLE     Major depressive disorder, single episode, mild (H) 6/7/2015     Sarcoidosis 1985    Historical possible diagnosis from the 1980's when patient was " "hospitalized at \"[facility]\" / [facility], but was not an official diagnosis. Diagnosis was carried over from chart from her previous clinic.     Sepsis (H) 5/23/2019     Venous stasis ulcer of calf limited to breakdown of skin, unspecified laterality, unspecified whether varicose veins present (H) 9/26/2018       PAST SURGICAL HISTORY  Past Surgical History:   Procedure Laterality Date     GASTRIC BYPASS         FAMILY MEDICAL HISTORY  Family History   Problem Relation Age of Onset     Cardiovascular Mother      Other - See Comments Mother         history of kidney stone.     Respiratory Father         asthma/COPD     Parkinsonism Father      Hypertension Sister      Cardiovascular Brother         heart valve issue     Heart Disease Brother         stent     Diabetes Brother        SOCIAL HISTORY  History   Smoking Status     Never Smoker   Smokeless Tobacco     Never Used     Comment: second hand smoke exposure     Social History    Substance and Sexual Activity      Alcohol use: No    History   Drug Use No     Social History     Patient does not qualify to have social determinant information on file (likely too young).   Social History Narrative    Lives in [place]. Works part time for [employer].        September 24, 2019:  Never , no children; has one sister, Sloane, and two brothers.  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  Never smoked, does not drink, denies use of illicit/street drugs.  Identifies [place] denomination as [employer]al but not affiliated with any Christianity at present.  States she has a home in [place] but has been living in independent living at [place] [facility] [address] [facility].  Most recently had [facility] home care services but anticipates she may now need long term care as she's not able to care for herself any longer.  No longer drives but still manages her own finances.  Orders groceries from [employer], which delivers.      Emanuel Silverio (Ann), " "[facility] Palliative Care    [facility] cell:  [phone]        SPIRITUAL HISTORY  As above; denclined a referral to Spiritual Care.    ALLERGIES  Allergies   Allergen Reactions     Ibuprofen Difficulty breathing     \"breathing problem\"       MEDICATIONS  Medications Prior to Admission  No current facility-administered medications on file prior to encounter.   acetaminophen (TYLENOL) 500 MG tablet, Take 500 mg by mouth every 6 hours as needed for mild pain  albuterol (PROAIR HFA/PROVENTIL HFA/VENTOLIN HFA) 108 (90 Base) MCG/ACT inhaler, Inhale 2 puffs into the lungs every 6 hours as needed for shortness of breath / dyspnea or wheezing  albuterol (PROVENTIL) (2.5 MG/3ML) 0.083% neb solution, Take 1 vial (2.5 mg) by nebulization 4 times daily  calcium carbonate (OS-DRAKE 500 MG [place]. [place]) 500 MG tablet, Take 500 mg by mouth 2 times daily  diltiazem ER (DILT-XR) 180 MG 24 hr capsule, Take 1 capsule (180 mg) by mouth daily  ELIQUIS 5 MG tablet, TAKE 1 TABLET(5 MG) BY MOUTH TWICE DAILY  Ferrous Sulfate (IRON) 325 (65 Fe) MG tablet, Take 1 tablet by mouth daily (with breakfast)  fluticasone (FLOVENT DISKUS) 250 MCG/BLIST inhaler, Inhale 1 puff into the lungs every 12 hours  multivitamin, therapeutic (THERA-VIT) TABS, Take 1 tablet by mouth daily  ipratropium - albuterol 0.5 mg/2.5 mg/3 mL (DUONEB) 0.5-2.5 (3) MG/3ML neb solution, Take 1 vial (3 mLs) by nebulization every 4 hours as needed for shortness of breath / dyspnea or wheezing  loperamide (IMODIUM A-D) 2 MG tablet, Take 1 tablet (2 mg) by mouth 4 times daily as needed for diarrhea  miconazole (MICATIN/MICRO GUARD) 2 % external powder, Apply topically 2 times daily  order for DME, [employer] for longterm venous ulcer and edema mgmt.  Fax order to Flori Tyler To Bev @ Fax# [phone]        Current Medications    albuterol  2.5 mg Nebulization 4x Daily     apixaban ANTICOAGULANT  5 mg Oral BID     cefTRIAXone  1 g " "Intravenous Q24H     diltiazem ER  180 mg Oral Daily     furosemide  40 mg Intravenous BID     miconazole   Topical BID     sertraline  25 mg Oral Daily     sodium chloride (PF)  3 mL Intracatheter Q8H       PRN Medications  acetaminophen, acetaminophen, ipratropium - albuterol 0.5 mg/2.5 mg/3 mL, lidocaine 4%, lidocaine (buffered or not buffered), melatonin, metoprolol, naloxone, ondansetron **OR** ondansetron, - MEDICATION INSTRUCTIONS -, prochlorperazine **OR** prochlorperazine **OR** prochlorperazine, sodium chloride (PF)      REVIEW OF SYSTEMS  \"Supposes\" she might be depressed but doesn't feel that past antidepressants helped her at all.  Denies suicidal ideation.  Expressed confidence in her sister Sloane.  Complained of being given diuretics for her heart disease as they cause her to urinate.  Denies use of a CPAP at home, and denies use of O2 at baseline.  Refused OT this morning, but agreed to work with OT and PT later today in order to be qualified for TCU care.  Denies constipation; states last BM was 2d ago.  Denies pain.  SLUMS noted to be 25/30 upon discharge from one of her recent TCU stays.  Denies pain and dyspnea.     Performance Assessment:    Palliative Performance Scale, v.2     40%  Extensive disease. Mainly in bed w/little ambulation. ADLs/activities mainly w/assistance. Normal or reduced intake. Full LOC or drowsy or confused.        PHYSICAL EXAMINATION  Patient Vitals for the past 24 hrs:   BP Temp Temp src Pulse Heart Rate Resp SpO2 Height Weight   09/24/19 1200 103/52 -- -- 95 -- -- 91 % -- --   09/24/19 0802 107/41 -- -- 104 -- -- 93 % -- --   09/24/19 0640 95/53 97.3  F (36.3  C) Oral 99 -- 18 90 % -- --   09/24/19 0302 107/61 97.3  F (36.3  C) Oral 78 -- 20 91 % -- --   09/24/19 0139 -- -- -- -- 116 -- -- -- --   09/24/19 0012 -- -- -- -- -- -- -- -- (!) 155 kg (341 lb 11.4 oz)   09/23/19 2233 -- -- -- -- 109 -- -- -- --   09/23/19 2218 -- -- -- -- 105 -- 92 % -- --   09/23/19 2210 -- " "-- -- -- -- -- 91 % -- --   09/23/19 2159 (!) 148/85 97.4  F (36.3  C) Oral -- 132 18 (!) 80 % -- --   09/23/19 2043 (!) 113/97 96.7  F (35.9  C) Axillary 117 117 22 94 % -- --   09/23/19 1845 108/88 -- -- 59 -- -- 90 % -- --   09/23/19 1637 97/40 -- -- 97 -- -- 93 % -- --   09/23/19 1635 (!) 86/30 -- -- -- 90 22 92 % 1.6 m (5' 3\") 149.7 kg (330 lb)      Wt Readings from Last 5 Encounters:   09/24/19 (!) 155 kg (341 lb 11.4 oz)   08/23/19 146.1 kg (322 lb)   08/22/19 146.1 kg (322 lb)   06/24/19 (!) 150.6 kg (332 lb)   06/18/19 148.8 kg (328 lb)     Constitutional:  Very somnolent; became more wakeful after I removed her blanket  Eyes:  Anicteric, PERRL  HEENT:  OP pink, moist, dentures, several missing teeth  Lymph/Hematologic:  No epitrochlear, axillary, anterior or posterior cervical, or supraclavicular lymphadenopathy is appreciated  Cardiovascular:  Irreg irreg tachy  Respiratory:  Audible wheezing but clear breath sounds; O2 in place  GI: Enormous abdomen and panus nearly reaching the siderails, soft, non-tender, normal bowel sounds  Genitourinary:  Lee draining concentrated urine  Skin:  Anterior lower left calf w/venous stasis changes, moist serosanginous drainage   Neurological:  nonfocal  Psych:  Oriented x 3, somnolent but with persistence will awaken; some speech (complaints) is tangential      DATA  ROUTINE ICU LABS (Last four results)  CMP  Recent Labs   Lab 09/24/19  0552 09/23/19  1904    132*   POTASSIUM 5.5* 5.5*   CHLORIDE 98 96   CO2 31 29   ANIONGAP 4 7   GLC 97 89   BUN 35* 35*   CR 0.81 0.80   GFRESTIMATED 74 76   GFRESTBLACK 86 88   DRAKE 7.9* 8.3*   PROTTOTAL  --  8.0   ALBUMIN  --  3.2*   BILITOTAL  --  1.1   ALKPHOS  --  112   AST  --  21   ALT  --  15     CBC  Recent Labs   Lab 09/24/19  0552 09/23/19  1718   WBC 8.2 7.1   RBC 5.61* 5.51*   HGB 13.9 13.8   HCT 49.4* 47.6*   MCV 88 86   MCH 24.8* 25.0*   MCHC 28.1* 29.0*   RDW 18.2* 18.5*    265     INRNo lab results found in " last 7 days.  Arterial Blood GasNo lab results found in last 7 days.      Recent Results (from the past 48 hour(s))   XR Chest Port 1 View    Narrative    XR CHEST PORT 1 VW  9/24/2019 1:33 AM     HISTORY: Hypoxia.    COMPARISON: 5/24/2019.    FINDINGS: There are bilateral pleural effusions and bibasilar  infiltrates. The upper lungs are clear. The heart is enlarged. No  pulmonary edema. The thoracic aorta is calcified.      Impression    IMPRESSION: Bilateral pleural effusions and associated bibasilar  infiltrates.    TERENCE LEWIS MD

## 2020-01-24 NOTE — PHYSICAL THERAPY INITIAL EVALUATION ADULT - ORIENTATION, REHAB EVAL
[FreeTextEntry1] : We spent sufficient time to discuss aspects of care; questions were answered  to patient's satisfaction.The diagnosis and care plan were discussed with patient in detail.  Patient test results were  reviewed and explained in full. All questions were answered.\par \par 
oriented to person, place, time and situation

## 2021-03-08 ENCOUNTER — INPATIENT (INPATIENT)
Facility: HOSPITAL | Age: 66
LOS: 3 days | Discharge: HOSPICE | End: 2021-03-12
Attending: STUDENT IN AN ORGANIZED HEALTH CARE EDUCATION/TRAINING PROGRAM | Admitting: STUDENT IN AN ORGANIZED HEALTH CARE EDUCATION/TRAINING PROGRAM
Payer: MEDICARE

## 2021-03-08 VITALS
HEIGHT: 68 IN | HEART RATE: 112 BPM | RESPIRATION RATE: 18 BRPM | OXYGEN SATURATION: 100 % | DIASTOLIC BLOOD PRESSURE: 59 MMHG | SYSTOLIC BLOOD PRESSURE: 77 MMHG

## 2021-03-08 DIAGNOSIS — Z96.642 PRESENCE OF LEFT ARTIFICIAL HIP JOINT: Chronic | ICD-10-CM

## 2021-03-08 DIAGNOSIS — Z98.890 OTHER SPECIFIED POSTPROCEDURAL STATES: Chronic | ICD-10-CM

## 2021-03-08 LAB
ALBUMIN SERPL ELPH-MCNC: 1.7 G/DL — LOW (ref 3.3–5)
ALP SERPL-CCNC: 223 U/L — HIGH (ref 40–120)
ALT FLD-CCNC: 9 U/L — SIGNIFICANT CHANGE UP (ref 4–33)
ANION GAP SERPL CALC-SCNC: 12 MMOL/L — SIGNIFICANT CHANGE UP (ref 7–14)
ANISOCYTOSIS BLD QL: SIGNIFICANT CHANGE UP
APTT BLD: 62.5 SEC — HIGH (ref 27–36.3)
AST SERPL-CCNC: 20 U/L — SIGNIFICANT CHANGE UP (ref 4–32)
BASE EXCESS BLDV CALC-SCNC: -0.3 MMOL/L — SIGNIFICANT CHANGE UP (ref -3–2)
BASOPHILS # BLD AUTO: 0 K/UL — SIGNIFICANT CHANGE UP (ref 0–0.2)
BASOPHILS NFR BLD AUTO: 0 % — SIGNIFICANT CHANGE UP (ref 0–2)
BILIRUB SERPL-MCNC: 1.4 MG/DL — HIGH (ref 0.2–1.2)
BLOOD GAS VENOUS - CREATININE: 1.4 MG/DL — HIGH (ref 0.5–1.3)
BLOOD GAS VENOUS COMPREHENSIVE RESULT: SIGNIFICANT CHANGE UP
BUN SERPL-MCNC: 16 MG/DL — SIGNIFICANT CHANGE UP (ref 7–23)
CALCIUM SERPL-MCNC: 8.4 MG/DL — SIGNIFICANT CHANGE UP (ref 8.4–10.5)
CHLORIDE BLDV-SCNC: 107 MMOL/L — SIGNIFICANT CHANGE UP (ref 96–108)
CHLORIDE SERPL-SCNC: 104 MMOL/L — SIGNIFICANT CHANGE UP (ref 98–107)
CO2 SERPL-SCNC: 24 MMOL/L — SIGNIFICANT CHANGE UP (ref 22–31)
CREAT SERPL-MCNC: 1.35 MG/DL — HIGH (ref 0.5–1.3)
EOSINOPHIL # BLD AUTO: 0 K/UL — SIGNIFICANT CHANGE UP (ref 0–0.5)
EOSINOPHIL NFR BLD AUTO: 0 % — SIGNIFICANT CHANGE UP (ref 0–6)
GAS PNL BLDV: 137 MMOL/L — SIGNIFICANT CHANGE UP (ref 136–146)
GIANT PLATELETS BLD QL SMEAR: PRESENT — SIGNIFICANT CHANGE UP
GLUCOSE BLDV-MCNC: 100 MG/DL — HIGH (ref 70–99)
GLUCOSE SERPL-MCNC: 101 MG/DL — HIGH (ref 70–99)
HCO3 BLDV-SCNC: 23 MMOL/L — SIGNIFICANT CHANGE UP (ref 20–27)
HCT VFR BLD CALC: 25.1 % — LOW (ref 34.5–45)
HCT VFR BLDA CALC: 23.8 % — LOW (ref 34.5–46.5)
HGB BLD CALC-MCNC: 7.6 G/DL — LOW (ref 11.5–15.5)
HGB BLD-MCNC: 7 G/DL — CRITICAL LOW (ref 11.5–15.5)
HYPOCHROMIA BLD QL: SLIGHT — SIGNIFICANT CHANGE UP
IANC: 33.23 K/UL — HIGH (ref 1.5–8.5)
INR BLD: 1.89 RATIO — HIGH (ref 0.88–1.16)
LACTATE BLDV-MCNC: 3.7 MMOL/L — HIGH (ref 0.5–2)
LYMPHOCYTES # BLD AUTO: 0 % — LOW (ref 13–44)
LYMPHOCYTES # BLD AUTO: 0 K/UL — LOW (ref 1–3.3)
MACROCYTES BLD QL: SIGNIFICANT CHANGE UP
MCHC RBC-ENTMCNC: 27.9 GM/DL — LOW (ref 32–36)
MCHC RBC-ENTMCNC: 29.9 PG — SIGNIFICANT CHANGE UP (ref 27–34)
MCV RBC AUTO: 107.3 FL — HIGH (ref 80–100)
MONOCYTES # BLD AUTO: 0.73 K/UL — SIGNIFICANT CHANGE UP (ref 0–0.9)
MONOCYTES NFR BLD AUTO: 1.8 % — LOW (ref 2–14)
MYELOCYTES NFR BLD: 3.5 % — HIGH (ref 0–0)
NEUTROPHILS # BLD AUTO: 38.57 K/UL — HIGH (ref 1.8–7.4)
NEUTROPHILS NFR BLD AUTO: 94.7 % — HIGH (ref 43–77)
PCO2 BLDV: 48 MMHG — SIGNIFICANT CHANGE UP (ref 41–51)
PH BLDV: 7.33 — SIGNIFICANT CHANGE UP (ref 7.32–7.43)
PLAT MORPH BLD: NORMAL — SIGNIFICANT CHANGE UP
PLATELET # BLD AUTO: 128 K/UL — LOW (ref 150–400)
PLATELET COUNT - ESTIMATE: ABNORMAL
PO2 BLDV: 30 MMHG — LOW (ref 35–40)
POIKILOCYTOSIS BLD QL AUTO: SLIGHT — SIGNIFICANT CHANGE UP
POLYCHROMASIA BLD QL SMEAR: SLIGHT — SIGNIFICANT CHANGE UP
POTASSIUM BLDV-SCNC: 2.8 MMOL/L — CRITICAL LOW (ref 3.4–4.5)
POTASSIUM SERPL-MCNC: 3.1 MMOL/L — LOW (ref 3.5–5.3)
POTASSIUM SERPL-SCNC: 3.1 MMOL/L — LOW (ref 3.5–5.3)
PROT SERPL-MCNC: 5.3 G/DL — LOW (ref 6–8.3)
PROTHROM AB SERPL-ACNC: 20.9 SEC — HIGH (ref 10.6–13.6)
RBC # BLD: 2.34 M/UL — LOW (ref 3.8–5.2)
RBC # FLD: 21.2 % — HIGH (ref 10.3–14.5)
RBC BLD AUTO: ABNORMAL
SAO2 % BLDV: 37.5 % — LOW (ref 60–85)
SMUDGE CELLS # BLD: PRESENT — SIGNIFICANT CHANGE UP
SODIUM SERPL-SCNC: 140 MMOL/L — SIGNIFICANT CHANGE UP (ref 135–145)
WBC # BLD: 40.73 K/UL — CRITICAL HIGH (ref 3.8–10.5)
WBC # FLD AUTO: 40.73 K/UL — CRITICAL HIGH (ref 3.8–10.5)

## 2021-03-08 PROCEDURE — 71045 X-RAY EXAM CHEST 1 VIEW: CPT | Mod: 26

## 2021-03-08 RX ORDER — SODIUM CHLORIDE 9 MG/ML
500 INJECTION INTRAMUSCULAR; INTRAVENOUS; SUBCUTANEOUS ONCE
Refills: 0 | Status: DISCONTINUED | OUTPATIENT
Start: 2021-03-08 | End: 2021-03-08

## 2021-03-08 RX ORDER — DEXTROSE 50 % IN WATER 50 %
50 SYRINGE (ML) INTRAVENOUS ONCE
Refills: 0 | Status: COMPLETED | OUTPATIENT
Start: 2021-03-08 | End: 2021-03-08

## 2021-03-08 RX ORDER — CEFEPIME 1 G/1
2000 INJECTION, POWDER, FOR SOLUTION INTRAMUSCULAR; INTRAVENOUS ONCE
Refills: 0 | Status: COMPLETED | OUTPATIENT
Start: 2021-03-08 | End: 2021-03-08

## 2021-03-08 RX ORDER — SODIUM CHLORIDE 9 MG/ML
1000 INJECTION INTRAMUSCULAR; INTRAVENOUS; SUBCUTANEOUS ONCE
Refills: 0 | Status: COMPLETED | OUTPATIENT
Start: 2021-03-08 | End: 2021-03-08

## 2021-03-08 RX ORDER — MIDODRINE HYDROCHLORIDE 2.5 MG/1
10 TABLET ORAL THREE TIMES A DAY
Refills: 0 | Status: DISCONTINUED | OUTPATIENT
Start: 2021-03-08 | End: 2021-03-09

## 2021-03-08 RX ADMIN — SODIUM CHLORIDE 1000 MILLILITER(S): 9 INJECTION INTRAMUSCULAR; INTRAVENOUS; SUBCUTANEOUS at 23:37

## 2021-03-08 RX ADMIN — Medication 50 MILLILITER(S): at 20:23

## 2021-03-08 RX ADMIN — MIDODRINE HYDROCHLORIDE 10 MILLIGRAM(S): 2.5 TABLET ORAL at 21:04

## 2021-03-08 RX ADMIN — SODIUM CHLORIDE 1000 MILLILITER(S): 9 INJECTION INTRAMUSCULAR; INTRAVENOUS; SUBCUTANEOUS at 21:00

## 2021-03-08 NOTE — ED PROVIDER NOTE - ATTENDING CONTRIBUTION TO CARE
I have personally performed a face to face bedside history and physical examination of this patient. I have discussed the history, examination, review of systems, assessment and plan of management with the resident. I have reviewed the electronic medical record and amended it to reflect my history, review of systems, physical exam, assessment and plan.    Brief HPI:  64 yo F hx metastatic breast CA not currently on tx on home hospice, taking midodrine home for hypotension, presents to ed for poor po intake, lethargy.  Patient found to be hypotensive and hypoglycemic in field, no meds given prior to arrival.  On arrival, patient lethargic but protecting airway, unable to provide history.  History obtained by  who is in room.     Vitals:   Reviewed    Exam:    GEN:  lethargic, chronically ill appearing, aaox0, eyes open spontaneously   HEENT:  NCAT, neck supple, EOMI, PERRLA, sclera anicteric, no conjunctival pallor or injection, no stridor, weak voice, no tonsillar exudate, uvula midline, tympanic membranes and external auditory canals normal appearing bilaterally   CV:  regular rhythm and rate, s1/s2 audible, no murmurs, rubs or gallops, peripheral pulses 2+ and symmetric  PULM:  non-labored respirations, lungs clear to auscultation bilaterally, no wheezes, crackles or rales  ABD:  non distended, non-tender, no rebound, no guarding, negative Bowen's sign, bowel sounds normal, no cvat  MSK:  no gross deformity, non-tender extremities and joints, range of motion grossly normal appearing, 2+ edema in b/l ue and le, extremities warm and well perfused   NEURO:  alert but aaox0, CN II-XII intact, spontaneously moves extremities x4  SKIN:  extensive sacral ulcer involving subcutaneous tissues, no purulence or bleeding   :  Indwelling hernandez     A/P:  64 yo F hx metastatic breast CA not currently on tx on home hospice, taking midodrine home for hypotension, presents to ed for poor po intake, lethargy.  Hypotensive, hypoglycemic on arrival.  Given D50 amp with improvement.  Lethargic but protecting airway.  Suspect severe sepsis/septic shock vs. hypovolemic shock.  Unclear source at this time but UTI, abdominal, or ulcer considered.  Low c/f meninigitis.  Will send labs, cultures, cxr, change hernandez, IVF, abx.  Plan for CT head and abdomen.  Anticipate admission.  Code status DNR, but not DNI. I have personally performed a face to face bedside history and physical examination of this patient. I have discussed the history, examination, review of systems, assessment and plan of management with the resident. I have reviewed the electronic medical record and amended it to reflect my history, review of systems, physical exam, assessment and plan.    Brief HPI:  64 yo F hx metastatic breast CA not currently on tx on home hospice, taking midodrine home for hypotension, RUE PICC previously receiving abx? presents to ed for poor po intake, lethargy.  Patient found to be hypotensive and hypoglycemic in field, no meds given prior to arrival.  On arrival, patient lethargic but protecting airway, unable to provide history.  History obtained by  who is in room.     Vitals:   Reviewed    Exam:    GEN:  lethargic, chronically ill appearing, aaox0, eyes open spontaneously   HEENT:  NCAT, neck supple, EOMI, PERRLA, sclera anicteric, no conjunctival pallor or injection, no stridor, weak voice, no tonsillar exudate, uvula midline, tympanic membranes and external auditory canals normal appearing bilaterally   CV:  regular rhythm and rate, s1/s2 audible, no murmurs, rubs or gallops, peripheral pulses 2+ and symmetric  PULM:  non-labored respirations, lungs clear to auscultation bilaterally, no wheezes, crackles or rales  ABD:  non distended, non-tender, no rebound, no guarding, negative Bowen's sign, bowel sounds normal, no cvat  MSK:  no gross deformity, non-tender extremities and joints, range of motion grossly normal appearing, 2+ edema in b/l ue and le, extremities warm and well perfused   NEURO:  alert but aaox0, CN II-XII intact, spontaneously moves extremities x4  SKIN:  extensive sacral ulcer involving subcutaneous tissues, no purulence or bleeding.  RUE PICC in place without erythema or purulence  :  Indwelling hernandez     A/P:  64 yo F hx metastatic breast CA not currently on tx on home hospice, taking midodrine home for hypotension, presents to ed for poor po intake, lethargy.  Hypotensive, hypoglycemic on arrival.  Given D50 amp with improvement.  Lethargic but protecting airway.  Suspect severe sepsis/septic shock vs. hypovolemic shock.  Unclear source at this time but UTI, abdominal, or ulcer considered.  Low c/f meninigitis.  Will send labs, cultures, cxr, change hernandez, IVF, abx.  Plan for CT head and abdomen.  Anticipate admission.  Code status DNR, but not DNI.

## 2021-03-08 NOTE — ED PROVIDER NOTE - ADMIT DISPOSITION PRESENT ON ADMISSION SEPSIS Q1 - RE-EVALUATED PATIENT FLUID AND VITAL SIGNS
I have re-evaluated the patient's fluid status and reviewed vital signs. Clinical perfusion assessment was performed.
STG (3-5 sessions) sit to stand/ stand to sit supervision

## 2021-03-08 NOTE — ED PROVIDER NOTE - OBJECTIVE STATEMENT
64yo F hx metastatic breast CA not currently on tx (previous home hospice), BIBEMS from home for incraesing poor PO, increasing AMS (usually aaox3, now aaox0 but following commands slowly) and hypotensive to 60s and hypoglycemic to 50s at home. Found in triage to also be hypoxic to 80s on RA. Pt cannot give history. Of note usually takes midodrine 10mg q8 but only got one dose today 66yo F hx metastatic breast CA not currently on tx (previous home hospice), BIBEMS from home for incraesing poor PO, increasing AMS (usually aaox3, now aaox0 but following commands slowly) and hypotensive to 60s and hypoglycemic to 50s at home. Found in triage to also be hypoxic to 80s on RA. Pt cannot give history. Of note usually takes midodrine 10mg q8 but only got one dose today and her normal bp is 90s-100s 64yo F hx metastatic breast CA not currently on tx (previous home hospice), BIBEMS from home for incraesing poor PO, increasing AMS (usually aaox3, now aaox0 but following commands slowly) and hypotensive to 60s and hypoglycemic to 50s at home. Found in triage to also be hypoxic to 80s on RA. Pt cannot give history. Of note usually takes midodrine 10mg q8 but only got one dose today and her normal bp is 90s-100s    PCP: Ricci Hare MD  Onco: Tj

## 2021-03-08 NOTE — ED PROVIDER NOTE - PHYSICAL EXAMINATION
Gen: Chronically ill appearing, cachectic  Head: NCAT  HEENT: PERRL, dry mucus membranes, normal conjunctiva, anicteric, neck supple  Lung: CTAB, no adventitious sounds  CV: RRR, no murmurs  Abd: soft, mildly ttp nofocally throughout abd , no rebound or guarding, no CVAT  MSK: no visible deformities, gravity dependent edema in all extremities  Neuro: Moving all extremity grossly  Skin: Warm and dry, no evidence of rash Gen: Chronically ill appearing, cachectic  Head: NCAT  HEENT: PERRL, dry mucus membranes, normal conjunctiva, anicteric, neck supple  Lung: CTAB, no adventitious sounds  CV: RRR, no murmurs  Abd: soft, mildly ttp nofocally throughout abd , no rebound or guarding, no CVAT  MSK: no visible deformities, gravity dependent edema in all extremities  Neuro: Moving all extremity grossly  Skin: Warm and dry, no evidence of rash, sacral decub stage 4 across whole sacrum down to bone w/ granulation tissue

## 2021-03-08 NOTE — ED ADULT NURSE NOTE - NSIMPLEMENTINTERV_GEN_ALL_ED
Implemented All Fall with Harm Risk Interventions:  [place] to call system. Call bell, personal items and telephone within reach. Instruct patient to call for assistance. Room bathroom lighting operational. Non-slip footwear when patient is off stretcher. Physically safe environment: no spills, clutter or unnecessary equipment. Stretcher in lowest position, wheels locked, appropriate side rails in place. Provide visual cue, wrist band, yellow gown, etc. Monitor gait and stability. Monitor for mental status changes and reorient to person, place, and time. Review medications for side effects contributing to fall risk. Reinforce activity limits and safety measures with patient and family. Provide visual clues: red socks.

## 2021-03-08 NOTE — ED PROVIDER NOTE - PROGRESS NOTE DETAILS
Lc Peters DO PGY3 - confirmed w/ family that pt is DNR but is not DNI at this time. Monique Otero MD, PGY3: Patient received at resident sign out.  Jordin Waldron, is at the bedside ([phone]). Daughter over the phone, gave an update of the current condition. Understands well, septic julia, reflactory to fluid resuscitation requiring pressures. Pt  and daughter wants to continue intensive treatment including antibiotics and pressures except resuscitation. DNR. ICU to see pt a second time- pt improved with blood hanging.  Pt to be admitted to hospital per family wishes.  Pt is confused. PGY3/MD Branden. sBP >100, stabilizing after transfusion given. ICU agrees with admitting to the floor. Not successful to communicate with Private attending, now pt has to admit to the house hospitalist. PGY3/MD Branden. BP recovered to 90's, pt is more responsive. Daughter is in the room, understanding the situation. MICU called again, but during the morning round, delayed follow up the pt. but given stablizing vital signs after transfusion, likely admission to floor. We called his PCP, Dr. Hare's office, no body  the phone for the last 2 hrs, since 830.

## 2021-03-08 NOTE — ED ADULT TRIAGE NOTE - CHIEF COMPLAINT QUOTE
Patient brought to ER by EMS from home for Altered Mental Status for the past two days. Patient has 82% on room air and hypoxic.  FS 81  : [phone]

## 2021-03-08 NOTE — ED ADULT NURSE NOTE - CHIEF COMPLAINT QUOTE
Patient brought to ER by EMS from home for Altered Mental Status for the past two days. Patient has 82% on room air and hypoxic.  FS 81  : [phone]

## 2021-03-08 NOTE — ED PROVIDER NOTE - CLINICAL SUMMARY MEDICAL DECISION MAKING FREE TEXT BOX
Hx metastatic CA w/ AMS, poor PO, hypoglycemia/hypoxia/hypotension. Concern of sepsis vs FTT. Labs, CT, resuscitate

## 2021-03-09 DIAGNOSIS — G93.40 ENCEPHALOPATHY, UNSPECIFIED: ICD-10-CM

## 2021-03-09 DIAGNOSIS — R09.02 HYPOXEMIA: ICD-10-CM

## 2021-03-09 DIAGNOSIS — Z29.9 ENCOUNTER FOR PROPHYLACTIC MEASURES, UNSPECIFIED: ICD-10-CM

## 2021-03-09 DIAGNOSIS — C50.919 MALIGNANT NEOPLASM OF UNSPECIFIED SITE OF UNSPECIFIED FEMALE BREAST: ICD-10-CM

## 2021-03-09 DIAGNOSIS — L89.159 PRESSURE ULCER OF SACRAL REGION, UNSPECIFIED STAGE: ICD-10-CM

## 2021-03-09 DIAGNOSIS — A41.9 SEPSIS, UNSPECIFIED ORGANISM: ICD-10-CM

## 2021-03-09 DIAGNOSIS — Z51.5 ENCOUNTER FOR PALLIATIVE CARE: ICD-10-CM

## 2021-03-09 DIAGNOSIS — I10 ESSENTIAL (PRIMARY) HYPERTENSION: ICD-10-CM

## 2021-03-09 DIAGNOSIS — I95.9 HYPOTENSION, UNSPECIFIED: ICD-10-CM

## 2021-03-09 DIAGNOSIS — Z71.89 OTHER SPECIFIED COUNSELING: ICD-10-CM

## 2021-03-09 DIAGNOSIS — E78.5 HYPERLIPIDEMIA, UNSPECIFIED: ICD-10-CM

## 2021-03-09 DIAGNOSIS — R53.2 FUNCTIONAL QUADRIPLEGIA: ICD-10-CM

## 2021-03-09 LAB
APPEARANCE UR: ABNORMAL
BACTERIA # UR AUTO: ABNORMAL
BASOPHILS # BLD AUTO: 0.08 K/UL — SIGNIFICANT CHANGE UP (ref 0–0.2)
BASOPHILS NFR BLD AUTO: 0.2 % — SIGNIFICANT CHANGE UP (ref 0–2)
BILIRUB UR-MCNC: NEGATIVE — SIGNIFICANT CHANGE UP
BLD GP AB SCN SERPL QL: NEGATIVE — SIGNIFICANT CHANGE UP
BLOOD GAS VENOUS COMPREHENSIVE RESULT: SIGNIFICANT CHANGE UP
BLOOD GAS VENOUS COMPREHENSIVE RESULT: SIGNIFICANT CHANGE UP
COLOR SPEC: YELLOW — SIGNIFICANT CHANGE UP
DIFF PNL FLD: ABNORMAL
EOSINOPHIL # BLD AUTO: 0.04 K/UL — SIGNIFICANT CHANGE UP (ref 0–0.5)
EOSINOPHIL NFR BLD AUTO: 0.1 % — SIGNIFICANT CHANGE UP (ref 0–6)
EPI CELLS # UR: SIGNIFICANT CHANGE UP /HPF (ref 0–5)
GLUCOSE UR QL: NEGATIVE — SIGNIFICANT CHANGE UP
HCT VFR BLD CALC: 19.7 % — CRITICAL LOW (ref 34.5–45)
HGB BLD-MCNC: 5.5 G/DL — CRITICAL LOW (ref 11.5–15.5)
HYALINE CASTS # UR AUTO: SIGNIFICANT CHANGE UP /LPF (ref 0–7)
IANC: 34.82 K/UL — HIGH (ref 1.5–8.5)
IMM GRANULOCYTES NFR BLD AUTO: 9.2 % — HIGH (ref 0–1.5)
KETONES UR-MCNC: NEGATIVE — SIGNIFICANT CHANGE UP
LEUKOCYTE ESTERASE UR-ACNC: ABNORMAL
LYMPHOCYTES # BLD AUTO: 0.64 K/UL — LOW (ref 1–3.3)
LYMPHOCYTES # BLD AUTO: 1.6 % — LOW (ref 13–44)
MCHC RBC-ENTMCNC: 27.9 GM/DL — LOW (ref 32–36)
MCHC RBC-ENTMCNC: 30.2 PG — SIGNIFICANT CHANGE UP (ref 27–34)
MCV RBC AUTO: 108.2 FL — HIGH (ref 80–100)
MONOCYTES # BLD AUTO: 1.31 K/UL — HIGH (ref 0–0.9)
MONOCYTES NFR BLD AUTO: 3.2 % — SIGNIFICANT CHANGE UP (ref 2–14)
NEUTROPHILS # BLD AUTO: 34.82 K/UL — HIGH (ref 1.8–7.4)
NEUTROPHILS NFR BLD AUTO: 85.7 % — HIGH (ref 43–77)
NITRITE UR-MCNC: NEGATIVE — SIGNIFICANT CHANGE UP
NRBC # BLD: 0 /100 WBCS — SIGNIFICANT CHANGE UP
NRBC # FLD: 0.08 K/UL — HIGH
PH UR: 6.5 — SIGNIFICANT CHANGE UP (ref 5–8)
PLATELET # BLD AUTO: 95 K/UL — LOW (ref 150–400)
PROT UR-MCNC: ABNORMAL
RBC # BLD: 1.82 M/UL — LOW (ref 3.8–5.2)
RBC # FLD: 21.1 % — HIGH (ref 10.3–14.5)
RBC CASTS # UR COMP ASSIST: >10 /HPF — HIGH (ref 0–4)
RH IG SCN BLD-IMP: POSITIVE — SIGNIFICANT CHANGE UP
RH IG SCN BLD-IMP: POSITIVE — SIGNIFICANT CHANGE UP
SARS-COV-2 RNA SPEC QL NAA+PROBE: SIGNIFICANT CHANGE UP
SP GR SPEC: 1.02 — SIGNIFICANT CHANGE UP (ref 1.01–1.02)
TROPONIN T, HIGH SENSITIVITY RESULT: 48 NG/L — SIGNIFICANT CHANGE UP
UROBILINOGEN FLD QL: ABNORMAL
WBC # BLD: 40.8 K/UL — CRITICAL HIGH (ref 3.8–10.5)
WBC # FLD AUTO: 40.8 K/UL — CRITICAL HIGH (ref 3.8–10.5)
WBC UR QL: SIGNIFICANT CHANGE UP /HPF (ref 0–5)

## 2021-03-09 PROCEDURE — 70450 CT HEAD/BRAIN W/O DYE: CPT | Mod: 26

## 2021-03-09 PROCEDURE — 99233 SBSQ HOSP IP/OBS HIGH 50: CPT | Mod: GC

## 2021-03-09 PROCEDURE — 74177 CT ABD & PELVIS W/CONTRAST: CPT | Mod: 26

## 2021-03-09 PROCEDURE — 99223 1ST HOSP IP/OBS HIGH 75: CPT | Mod: AI

## 2021-03-09 PROCEDURE — 99223 1ST HOSP IP/OBS HIGH 75: CPT

## 2021-03-09 PROCEDURE — 99497 ADVNCD CARE PLAN 30 MIN: CPT | Mod: 25

## 2021-03-09 RX ORDER — ACETAMINOPHEN 500 MG
1000 TABLET ORAL ONCE
Refills: 0 | Status: COMPLETED | OUTPATIENT
Start: 2021-03-09 | End: 2021-03-10

## 2021-03-09 RX ORDER — SODIUM CHLORIDE 9 MG/ML
500 INJECTION, SOLUTION INTRAVENOUS ONCE
Refills: 0 | Status: COMPLETED | OUTPATIENT
Start: 2021-03-09 | End: 2021-03-09

## 2021-03-09 RX ORDER — VANCOMYCIN HCL 1 G
1500 VIAL (EA) INTRAVENOUS EVERY 12 HOURS
Refills: 0 | Status: DISCONTINUED | OUTPATIENT
Start: 2021-03-10 | End: 2021-03-10

## 2021-03-09 RX ORDER — METHADONE HYDROCHLORIDE 40 MG/1
1 TABLET ORAL
Qty: 0 | Refills: 0 | DISCHARGE

## 2021-03-09 RX ORDER — OXYCODONE HYDROCHLORIDE 5 MG/1
5 TABLET ORAL
Qty: 0 | Refills: 0 | DISCHARGE

## 2021-03-09 RX ORDER — NOREPINEPHRINE BITARTRATE/D5W 8 MG/250ML
0.05 PLASTIC BAG, INJECTION (ML) INTRAVENOUS
Qty: 8 | Refills: 0 | Status: DISCONTINUED | OUTPATIENT
Start: 2021-03-09 | End: 2021-03-09

## 2021-03-09 RX ORDER — ACETAMINOPHEN 500 MG
975 TABLET ORAL ONCE
Refills: 0 | Status: COMPLETED | OUTPATIENT
Start: 2021-03-09 | End: 2021-03-09

## 2021-03-09 RX ORDER — VANCOMYCIN HCL 1 G
VIAL (EA) INTRAVENOUS
Refills: 0 | Status: DISCONTINUED | OUTPATIENT
Start: 2021-03-09 | End: 2021-03-09

## 2021-03-09 RX ORDER — MIDODRINE HYDROCHLORIDE 2.5 MG/1
40 TABLET ORAL EVERY 8 HOURS
Refills: 0 | Status: DISCONTINUED | OUTPATIENT
Start: 2021-03-09 | End: 2021-03-12

## 2021-03-09 RX ORDER — VANCOMYCIN HCL 1 G
1500 VIAL (EA) INTRAVENOUS ONCE
Refills: 0 | Status: COMPLETED | OUTPATIENT
Start: 2021-03-09 | End: 2021-03-09

## 2021-03-09 RX ORDER — MIDODRINE HYDROCHLORIDE 2.5 MG/1
30 TABLET ORAL ONCE
Refills: 0 | Status: COMPLETED | OUTPATIENT
Start: 2021-03-09 | End: 2021-03-09

## 2021-03-09 RX ORDER — EVEROLIMUS 10 MG/1
1 TABLET ORAL
Qty: 0 | Refills: 0 | DISCHARGE

## 2021-03-09 RX ORDER — METRONIDAZOLE 500 MG
500 TABLET ORAL EVERY 8 HOURS
Refills: 0 | Status: DISCONTINUED | OUTPATIENT
Start: 2021-03-09 | End: 2021-03-10

## 2021-03-09 RX ORDER — FENTANYL CITRATE 50 UG/ML
1 INJECTION INTRAVENOUS ONCE
Refills: 0 | Status: DISCONTINUED | OUTPATIENT
Start: 2021-03-09 | End: 2021-03-09

## 2021-03-09 RX ORDER — GABAPENTIN 400 MG/1
300 CAPSULE ORAL THREE TIMES A DAY
Refills: 0 | Status: DISCONTINUED | OUTPATIENT
Start: 2021-03-09 | End: 2021-03-12

## 2021-03-09 RX ORDER — CEFEPIME 1 G/1
2000 INJECTION, POWDER, FOR SOLUTION INTRAMUSCULAR; INTRAVENOUS EVERY 8 HOURS
Refills: 0 | Status: DISCONTINUED | OUTPATIENT
Start: 2021-03-09 | End: 2021-03-10

## 2021-03-09 RX ORDER — VANCOMYCIN HCL 1 G
1500 VIAL (EA) INTRAVENOUS ONCE
Refills: 0 | Status: DISCONTINUED | OUTPATIENT
Start: 2021-03-09 | End: 2021-03-09

## 2021-03-09 RX ORDER — SODIUM CHLORIDE 9 MG/ML
1000 INJECTION INTRAMUSCULAR; INTRAVENOUS; SUBCUTANEOUS
Refills: 0 | Status: COMPLETED | OUTPATIENT
Start: 2021-03-09 | End: 2021-03-10

## 2021-03-09 RX ORDER — METHADONE HYDROCHLORIDE 40 MG/1
5 TABLET ORAL
Refills: 0 | Status: DISCONTINUED | OUTPATIENT
Start: 2021-03-09 | End: 2021-03-10

## 2021-03-09 RX ORDER — SODIUM HYPOCHLORITE 0.125 %
1 SOLUTION, NON-ORAL MISCELLANEOUS
Refills: 0 | Status: DISCONTINUED | OUTPATIENT
Start: 2021-03-09 | End: 2021-03-12

## 2021-03-09 RX ORDER — VANCOMYCIN HCL 1 G
1000 VIAL (EA) INTRAVENOUS ONCE
Refills: 0 | Status: COMPLETED | OUTPATIENT
Start: 2021-03-09 | End: 2021-03-09

## 2021-03-09 RX ORDER — MIDODRINE HYDROCHLORIDE 2.5 MG/1
30 TABLET ORAL EVERY 8 HOURS
Refills: 0 | Status: DISCONTINUED | OUTPATIENT
Start: 2021-03-09 | End: 2021-03-09

## 2021-03-09 RX ORDER — SODIUM HYPOCHLORITE 0.125 %
1 SOLUTION, NON-ORAL MISCELLANEOUS
Qty: 0 | Refills: 0 | DISCHARGE

## 2021-03-09 RX ORDER — GABAPENTIN 400 MG/1
1 CAPSULE ORAL
Qty: 0 | Refills: 0 | DISCHARGE

## 2021-03-09 RX ORDER — VANCOMYCIN HCL 1 G
VIAL (EA) INTRAVENOUS
Refills: 0 | Status: DISCONTINUED | OUTPATIENT
Start: 2021-03-09 | End: 2021-03-10

## 2021-03-09 RX ADMIN — Medication 975 MILLIGRAM(S): at 05:58

## 2021-03-09 RX ADMIN — MIDODRINE HYDROCHLORIDE 10 MILLIGRAM(S): 2.5 TABLET ORAL at 05:47

## 2021-03-09 RX ADMIN — MIDODRINE HYDROCHLORIDE 10 MILLIGRAM(S): 2.5 TABLET ORAL at 12:40

## 2021-03-09 RX ADMIN — METHADONE HYDROCHLORIDE 5 MILLIGRAM(S): 40 TABLET ORAL at 23:27

## 2021-03-09 RX ADMIN — CEFEPIME 100 MILLIGRAM(S): 1 INJECTION, POWDER, FOR SOLUTION INTRAMUSCULAR; INTRAVENOUS at 00:03

## 2021-03-09 RX ADMIN — SODIUM CHLORIDE 500 MILLILITER(S): 9 INJECTION, SOLUTION INTRAVENOUS at 10:31

## 2021-03-09 RX ADMIN — FENTANYL CITRATE 1 PATCH: 50 INJECTION INTRAVENOUS at 11:44

## 2021-03-09 RX ADMIN — MIDODRINE HYDROCHLORIDE 30 MILLIGRAM(S): 2.5 TABLET ORAL at 13:52

## 2021-03-09 RX ADMIN — Medication 300 MILLIGRAM(S): at 18:34

## 2021-03-09 RX ADMIN — MIDODRINE HYDROCHLORIDE 40 MILLIGRAM(S): 2.5 TABLET ORAL at 23:27

## 2021-03-09 RX ADMIN — SODIUM CHLORIDE 1000 MILLILITER(S): 9 INJECTION, SOLUTION INTRAVENOUS at 04:48

## 2021-03-09 RX ADMIN — Medication 100 MILLIGRAM(S): at 23:32

## 2021-03-09 RX ADMIN — SODIUM CHLORIDE 100 MILLILITER(S): 9 INJECTION INTRAMUSCULAR; INTRAVENOUS; SUBCUTANEOUS at 18:34

## 2021-03-09 RX ADMIN — Medication 600 MILLIGRAM(S): at 07:44

## 2021-03-09 RX ADMIN — MIDODRINE HYDROCHLORIDE 30 MILLIGRAM(S): 2.5 TABLET ORAL at 20:53

## 2021-03-09 RX ADMIN — GABAPENTIN 300 MILLIGRAM(S): 400 CAPSULE ORAL at 23:36

## 2021-03-09 RX ADMIN — CEFEPIME 100 MILLIGRAM(S): 1 INJECTION, POWDER, FOR SOLUTION INTRAMUSCULAR; INTRAVENOUS at 23:29

## 2021-03-09 RX ADMIN — SODIUM CHLORIDE 1000 MILLILITER(S): 9 INJECTION, SOLUTION INTRAVENOUS at 04:06

## 2021-03-09 RX ADMIN — SODIUM CHLORIDE 100 MILLILITER(S): 9 INJECTION INTRAMUSCULAR; INTRAVENOUS; SUBCUTANEOUS at 16:37

## 2021-03-09 RX ADMIN — SODIUM CHLORIDE 500 MILLILITER(S): 9 INJECTION, SOLUTION INTRAVENOUS at 08:55

## 2021-03-09 RX ADMIN — Medication 100 MILLIGRAM(S): at 05:47

## 2021-03-09 RX ADMIN — Medication 250 MILLIGRAM(S): at 02:39

## 2021-03-09 NOTE — H&P ADULT - PROBLEM SELECTOR PLAN 4
Patient seen and evaluated at bedside by MICU and palliative care with ongoing GOC discussion. Made DNR/DNI by daughter Per surgery- Large open sacral wound -- majority of wound with pink granulation tissue. Some areas with fibrinous and necrotic tissue debrided sharply at bedside. No crepitus or erythema palpated on surrounding buttocks. Exposed sacral bone.  -Can dress sacral wound with Dakins soaked wet to dry dressing. Recommend wound care consult for additional dressing change recommendations   -Wound care nurse consult placed. AOx0-1 in the ED  likely in setting of sepsis.

## 2021-03-09 NOTE — CONSULT NOTE ADULT - PROBLEM SELECTOR RECOMMENDATION 3
Patient alert and oriented to self and place, reacts and responds slowly   Stated more than once in my presence " no more medications"

## 2021-03-09 NOTE — CONSULT NOTE ADULT - ASSESSMENT
Assessment/Plan: 65y Female with metastatic breast cancer to bone and chronic sacral wound presents with AMS and signs of infection of unknown etiology. Consult for r/o necrotizing soft tissue infection.   Sacral wound does not have stigmata of NSTI. Most of wound is healthy appearing.   WBC 40. Lactate 4.5. Hypoglycemic, normal sodium. UA with only small leuk esterases.   CT read pending.     - Sacral wound debrided sharply at bedside to remove some fibrinous area and necrotic tissue. No signs of deeper undrained infection based on exam. Prelim read of CT does not show signs of deep abscesses or tracking air either. Do not suspect NSTI.   - Continue empiric antibiotics  - Cultures pending     Assessment/Plan: 65y Female with metastatic breast cancer to bone and chronic sacral wound presents with AMS and signs of infection of unknown etiology. Consult for r/o necrotizing soft tissue infection.   Sacral wound does not have stigmata of NSTI. Most of wound is healthy appearing.   WBC 40. Lactate 4.5. Hypoglycemic, normal sodium. UA with only small leuk esterases.     - Sacral wound debrided sharply at bedside to remove some fibrinous area and necrotic tissue. No signs of deeper undrained infection based on exam. Read of CT does not show signs of deep abscesses or tracking air either. Do not suspect NSTI.   - Continue empiric antibiotics  - Cultures pending  - Can dress with Dakins soaked wet to dry dressing. Recommend wound care consult for additional dressing change recommendations    Discussed with Dr. Rosario  Pager [phone]   Assessment/Plan: 65y Female with metastatic breast cancer to bone and chronic sacral wound presents with AMS and signs of infection of unknown etiology. Consult for r/o necrotizing soft tissue infection.   Sacral wound does not have stigmata of NSTI. Most of wound is healthy appearing.   WBC 40. Lactate 4.5. Hypoglycemic, normal sodium. UA with only small leuk esterases.     - Sacral wound debrided sharply at bedside to remove some fibrinous area and necrotic tissue. No signs of deeper undrained infection based on exam. Read of CT does not show signs of deep abscesses or tracking air either. Do not suspect NSTI.   - Had PICC, could be line sepsis  - Continue empiric antibiotics  - Cultures pending  - Can dress sacral wound with Dakins soaked wet to dry dressing. Recommend wound care consult for additional dressing change recommendations    Discussed with Dr. Rosario  Pager [phone]

## 2021-03-09 NOTE — H&P ADULT - PROBLEM SELECTOR PLAN 5
DVT PPx: SQH  Diet: Clears can consider speech and swallow eval.   Dispo: Home with hospice pending resolution of medical issues. Patient seen and evaluated at bedside by MICU and palliative care with ongoing GOC discussion. Made DNR/DNI by daughter Per surgery- Large open sacral wound -- majority of wound with pink granulation tissue. Some areas with fibrinous and necrotic tissue debrided sharply at bedside. No crepitus or erythema palpated on surrounding buttocks. Exposed sacral bone.  -Can dress sacral wound with Dakins soaked wet to dry dressing. Recommend wound care consult for additional dressing change recommendations   -Wound care nurse consult placed.

## 2021-03-09 NOTE — ED ADULT NURSE REASSESSMENT NOTE - NS ED NURSE REASSESS COMMENT FT1
report taken from night PHONG Renner. pt resting in bed. on 6L O2 NC at this time, no use of accessory muscles, able to complete full sentences. awaiting confirm type and screen for blood transfusion. pt remains NSR on monitor. hypotensive at this time, mentating at baseline, able to answer questions correctly. A&OX4 at this time. VS as noted. remains on warming blanket as per MD orders. daughter at bedside. will continue to monitor. report taken from night PHONG Renner. pt resting in bed. on 6L O2 NC at this time, no use of accessory muscles, able to complete full sentences. awaiting confirm type and screen for blood transfusion. pt remains NSR on monitor. hypotensive at this time, mentating at baseline, able to answer questions correctly. A&OX3 at this time. VS as noted. remains on warming blanket as per MD orders. warm blankets provided to pt. daughter at bedside. will continue to monitor.

## 2021-03-09 NOTE — H&P ADULT - PROBLEM SELECTOR PLAN 1
sp vancomycin, cefepime, and clindamycin in the ED  CXR left pleural effusion v atelectasis and consolidation cannot be ruled out  UE with few bacteria and small LE and negative nitrite  Blood cultures x2 sent  Sacral decubitus ulcer evaluated by general surgery unlikely to be source clean on debridement.   CT abd pelvis with endometrial thickening suspicious for metastatic disease no obvious abscess  -empiric abx vanc by level and cefepime sp vancomycin, cefepime, and clindamycin in the ED  CXR left pleural effusion v atelectasis and consolidation cannot be ruled out  UE with few bacteria and small LE and negative nitrite  Blood cultures x2 sent  Sacral decubitus ulcer evaluated by general surgery unlikely to be source clean on debridement.   CT abd pelvis with endometrial thickening suspicious for metastatic disease no obvious abscess  -empiric abx vanc by level/cefepime/metronidazole or vanc/zosyn sp vancomycin, cefepime, and clindamycin in the ED  CXR left pleural effusion v atelectasis and consolidation cannot be ruled out  UE with few bacteria and small LE and negative nitrite  Blood cultures x2 sent  Sacral decubitus ulcer evaluated by general surgery unlikely to be source clean on debridement.   CT abd pelvis with endometrial thickening suspicious for metastatic disease no obvious abscess  May need PICC exchanged has been in place since admission to [facility] 1/2021  -empiric vanc 1g q12h and vanc level pre 4th dose Day 1 (3/9- )  -cefepime 2g q8h Day 1 (3/8 PM- )  -metronidazole Day 1 (3/9- )

## 2021-03-09 NOTE — ED ADULT NURSE REASSESSMENT NOTE - NS ED NURSE REASSESS COMMENT FT1
Patient resting in bed,  at bedside. A&Ox4. No complaints at this time. Patient breathing even and nonlabored. No physical indicators of pain present. Safety maintained. Patient stable upon exiting the room. awaiting second type and screen result. Patient resting in bed,  at bedside. A&Ox4. No complaints at this time. Patient breathing even and nonlabored. No physical indicators of pain present. Safety maintained. Patient stable upon exiting the room. awaiting second type and screen result. MD Beaulieu made aware of updated vitals.

## 2021-03-09 NOTE — CONSULT NOTE ADULT - ASSESSMENT
66 Y/O WITH METASTATIC BREAST CANCER, ADMITTED FROM HOME FOR AMS , WEAKNESS, POOR PO INTAKE, FOUND TO BE PROFOUNDLY HYPOTENSIVE, SEPTIC CALLED FOR GOALS OF CARE

## 2021-03-09 NOTE — CONSULT NOTE ADULT - SUBJECTIVE AND OBJECTIVE BOX
General Surgery Consult Note  Pager [phone]    HPI:  65-year-old female w/ metastatic breast cancer to bone (received oncologic care at [facility], but has been hospice), hypotension on midodrine, presents with two days of altered mental status. History obtained from patient's  who was at bedside. Patient usually A&Ox3 but was noticed to be unresponsive and disoriented. No fevers/chills at home. Denies reports of abdominal pain, diarrhea, cough.   She arrived to the ED with a leukocytosis of 40, MALACHI, lactic acidosis, hypoglycemic to 50's. Initially hypotensive but now at baseline BP after fluid bolus. Patient has a chronic sacral wound that had been addressed by visiting nurse services. General surgery consulted to rule out necrotizing soft tissue infection involving the sacral wound.    PAST MEDICAL & SURGICAL HISTORY:  Breast cancer  s/p lumpectomy    DAMASO (obstructive sleep apnea)  worse in past has had  100  pound weight loss ; refuses CPAP    Morbid obesity  BMI 46    H/O total hip arthroplasty, left  2018    S/P lumpectomy, left breast  2 years ago        ALLERGIES:    penicillins (Hives)    Intolerances      HOME MEDICATIONS:  midodrine    MEDICATIONS  (STANDING):  clindamycin IVPB 600 milliGRAM(s) IV Intermittent Once  midodrine. 10 milliGRAM(s) Oral three times a day      SOCIAL HISTORY:  Denies smoking and ETOH use. Lives with family.      FAMILY HISTORY:  No pertinent history in first degree relatives.  ___________________________________________  REVIEW OF SYSTEMS:  Unable to obtain due to current medical condition.  ___________________________________________  PHYSICAL EXAM:  Vital Signs Last 24 Hrs  T(C): 36.3 (09 Mar 2021 00:04), Max: 36.3 (09 Mar 2021 00:04)  T(F): 97.4 (09 Mar 2021 00:04), Max: 97.4 (09 Mar 2021 00:04)  HR: 82 (09 Mar 2021 00:04) (82 - 112)  BP: 91/57 (09 Mar 2021 00:04) (77/59 - 98/72)  BP(mean): --  RR: 16 (09 Mar 2021 00:04) (16 - 18)  SpO2: 100% (09 Mar 2021 00:04) (100% - 100%)CAPILLARY BLOOD GLUCOSE      POCT Blood Glucose.: 51 mg/dL (08 Mar 2021 19:58)    I&O's Detail      General: A&O x1, NAD  Neuro: Motor and sensory grossly intact with no focal deficits.  HEENT: Normocephalic, atraumatic, EOMI, anicteric sclerae.  Respiratory: Breathing non-labored.  CVS: Regular rate and rhythm  Abdomen: Soft, nondistended, nontender. No rebound, no guarding,   Back: Large open sacral wound -- majority of wound with pink granulation tissue. Some areas with fibrinous and necrotic tissue debrided sharply at bedside. No crepitus or erythema palpated on surrounding buttocks. Exposed sacral bone.   Extremities: Warm bilaterally.  MSK: Intact ROM.  ____________________________________________  LABS:  CBC Full  -  ( 08 Mar 2021 21:12 )  WBC Count : 40.73 K/uL  RBC Count : 2.34 M/uL  Hemoglobin : 7.0 g/dL  Hematocrit : 25.1 %  Platelet Count - Automated : 128 K/uL  Mean Cell Volume : 107.3 fL  Mean Cell Hemoglobin : 29.9 pg  Mean Cell Hemoglobin Concentration : 27.9 gm/dL  Auto Neutrophil # : 38.57 K/uL  Auto Lymphocyte # : 0.00 K/uL  Auto Monocyte # : 0.73 K/uL  Auto Eosinophil # : 0.00 K/uL  Auto Basophil # : 0.00 K/uL  Auto Neutrophil % : 94.7 %  Auto Lymphocyte % : 0.0 %  Auto Monocyte % : 1.8 %  Auto Eosinophil % : 0.0 %  Auto Basophil % : 0.0 %    -08    140  |  104  |  16  ----------------------------<  101<H>  3.1<L>   |  24  |  1.35<H>    Ca    8.4      08 Mar 2021 21:12    TPro  5.3<L>  /  Alb  1.7<L>  /  TBili  1.4<H>  /  DBili  x   /  AST  20  /  ALT  9   /  AlkPhos  223<H>  03-08    LIVER FUNCTIONS - ( 08 Mar 2021 21:12 )  Alb: 1.7 g/dL / Pro: 5.3 g/dL / ALK PHOS: 223 U/L / ALT: 9 U/L / AST: 20 U/L / GGT: x           PT/INR - ( 08 Mar 2021 21:12 )   PT: 20.9 sec;   INR: 1.89 ratio         PTT - ( 08 Mar 2021 21:12 )  PTT:62.5 sec  Urinalysis Basic - ( 09 Mar 2021 01:34 )    Color: Yellow / Appearance: Slightly Turbid / S.021 / pH: x  Gluc: x / Ketone: Negative  / Bili: Negative / Urobili: 6 mg/dL   Blood: x / Protein: 100 mg/dL / Nitrite: Negative   Leuk Esterase: Small / RBC: >10 /HPF / WBC 5-10 /HPF   Sq Epi: x / Non Sq Epi: 0-5 /HPF / Bacteria: Few    ____________________________________________  RADIOLOGY:  CT read pending. General Surgery Consult Note  Pager [phone]    HPI:  65-year-old female w/ metastatic breast cancer to bone (received oncologic care at [facility], but has been hospice), hypotension on midodrine, presents with two days of altered mental status. History obtained from patient's  who was at bedside. Patient usually A&Ox3 but was noticed to be unresponsive and disoriented. No fevers/chills at home. Denies reports of abdominal pain, diarrhea, cough.   She arrived to the ED with a leukocytosis of 40, MALACHI, lactic acidosis, hypoglycemic to 50's. Initially hypotensive but now at baseline BP after fluid bolus. Patient has a chronic sacral wound that had been addressed by visiting nurse services. General surgery consulted to rule out necrotizing soft tissue infection involving the sacral wound.    PAST MEDICAL & SURGICAL HISTORY:  Breast cancer  s/p lumpectomy    DAMASO (obstructive sleep apnea)  worse in past has had  100  pound weight loss ; refuses CPAP    Morbid obesity  BMI 46    H/O total hip arthroplasty, left  2018    S/P lumpectomy, left breast  2 years ago        ALLERGIES:    penicillins (Hives)    Intolerances      HOME MEDICATIONS:  midodrine    MEDICATIONS  (STANDING):  clindamycin IVPB 600 milliGRAM(s) IV Intermittent Once  midodrine. 10 milliGRAM(s) Oral three times a day      SOCIAL HISTORY:  Denies smoking and ETOH use. Lives with family.      FAMILY HISTORY:  No pertinent history in first degree relatives.  ___________________________________________  REVIEW OF SYSTEMS:  Unable to obtain due to current medical condition.  ___________________________________________  PHYSICAL EXAM:  Vital Signs Last 24 Hrs  T(C): 36.3 (09 Mar 2021 00:04), Max: 36.3 (09 Mar 2021 00:04)  T(F): 97.4 (09 Mar 2021 00:04), Max: 97.4 (09 Mar 2021 00:04)  HR: 82 (09 Mar 2021 00:04) (82 - 112)  BP: 91/57 (09 Mar 2021 00:04) (77/59 - 98/72)  BP(mean): --  RR: 16 (09 Mar 2021 00:04) (16 - 18)  SpO2: 100% (09 Mar 2021 00:04) (100% - 100%)CAPILLARY BLOOD GLUCOSE      POCT Blood Glucose.: 51 mg/dL (08 Mar 2021 19:58)    I&O's Detail      General: A&O x1, NAD  Neuro: Motor and sensory grossly intact with no focal deficits.  HEENT: Normocephalic, atraumatic, EOMI, anicteric sclerae.  Respiratory: Breathing non-labored.  CVS: Regular rate and rhythm  Abdomen: Soft, nondistended, nontender. No rebound, no guarding,   Back: Large open sacral wound -- majority of wound with pink granulation tissue. Some areas with fibrinous and necrotic tissue debrided sharply at bedside. No crepitus or erythema palpated on surrounding buttocks. Exposed sacral bone.   Extremities: Warm bilaterally.  MSK: Intact ROM.  ____________________________________________  LABS:  CBC Full  -  ( 08 Mar 2021 21:12 )  WBC Count : 40.73 K/uL  RBC Count : 2.34 M/uL  Hemoglobin : 7.0 g/dL  Hematocrit : 25.1 %  Platelet Count - Automated : 128 K/uL  Mean Cell Volume : 107.3 fL  Mean Cell Hemoglobin : 29.9 pg  Mean Cell Hemoglobin Concentration : 27.9 gm/dL  Auto Neutrophil # : 38.57 K/uL  Auto Lymphocyte # : 0.00 K/uL  Auto Monocyte # : 0.73 K/uL  Auto Eosinophil # : 0.00 K/uL  Auto Basophil # : 0.00 K/uL  Auto Neutrophil % : 94.7 %  Auto Lymphocyte % : 0.0 %  Auto Monocyte % : 1.8 %  Auto Eosinophil % : 0.0 %  Auto Basophil % : 0.0 %    -08    140  |  104  |  16  ----------------------------<  101<H>  3.1<L>   |  24  |  1.35<H>    Ca    8.4      08 Mar 2021 21:12    TPro  5.3<L>  /  Alb  1.7<L>  /  TBili  1.4<H>  /  DBili  x   /  AST  20  /  ALT  9   /  AlkPhos  223<H>  03-08    LIVER FUNCTIONS - ( 08 Mar 2021 21:12 )  Alb: 1.7 g/dL / Pro: 5.3 g/dL / ALK PHOS: 223 U/L / ALT: 9 U/L / AST: 20 U/L / GGT: x           PT/INR - ( 08 Mar 2021 21:12 )   PT: 20.9 sec;   INR: 1.89 ratio         PTT - ( 08 Mar 2021 21:12 )  PTT:62.5 sec  Urinalysis Basic - ( 09 Mar 2021 01:34 )    Color: Yellow / Appearance: Slightly Turbid / S.021 / pH: x  Gluc: x / Ketone: Negative  / Bili: Negative / Urobili: 6 mg/dL   Blood: x / Protein: 100 mg/dL / Nitrite: Negative   Leuk Esterase: Small / RBC: >10 /HPF / WBC 5-10 /HPF   Sq Epi: x / Non Sq Epi: 0-5 /HPF / Bacteria: Few    ____________________________________________  RADIOLOGY:  CT Abdomen and Pelvis w/ IV Cont (21 @ 01:48)   BONES/SOFT TISSUES:  A soft tissue lesion in the left sacral alae at the anterior superior iliac spine. Redemonstrated lumbar and pelvic sclerotic metastases, increased since prior exam. Degenerative change. Anasarca.    AORTA/MAJOR BRANCHES: Atherosclerotic change.    IMPRESSION: Limited evaluation secondary to streak artifact from the patient's upper extremities as well as bilateral hip prostheses.    New heterogeneous endometrial thickening and fluid in postmenopausal patient is suspicious for endometrial carcinoma until proven otherwise. Recommend direct inspection/tissue biopsy with GYN consultation.    New upper left renal soft tissue mass likely due to primary neoplasm. Recommend further evaluation with ultrasound or MRI.    Interval increase in diffuse sclerotic and lytic metastases in the thoracolumbar spine and left iliac crest.

## 2021-03-09 NOTE — ED ADULT NURSE REASSESSMENT NOTE - NS ED NURSE REASSESS COMMENT FT1
PRBC infusing as per orders, pt educated of transfusion reaction signs & symptoms, pt verbalizes understanding, remains on CM for monitoring, VS as noted, will continue to monitor.

## 2021-03-09 NOTE — H&P ADULT - ASSESSMENT
66yo F hx metastatic breast CA not currently on tx (previous home hospice) presenting for altered mental status found to be septic from undetermined source now on antibiotics and supportive measures with ongoing GOC discussion.

## 2021-03-09 NOTE — H&P ADULT - PROBLEM SELECTOR PLAN 8
DVT PPx: SQH  Diet: Clears can consider speech and swallow eval.   Dispo: Home with hospice pending resolution of medical issues. hold home statin

## 2021-03-09 NOTE — CONSULT NOTE ADULT - PROBLEM SELECTOR RECOMMENDATION 2
Not currently on treatment, had been home with hospice  CT with interval increase cclerotic and lytic mets to thoracolumbar spine and iliac crest,  renal soft tissue mass , endometrial thickening

## 2021-03-09 NOTE — ED ADULT NURSE REASSESSMENT NOTE - NS ED NURSE REASSESS COMMENT FT1
unable to obtain patient's oral temp despite multiple attempts with multiple thermometers. Axillary temp obtained -92.8. DO Fran made aware of vital signs. Warming blanket applied as ordered. Safety maintained. Patient stable upon exiting the room.

## 2021-03-09 NOTE — H&P ADULT - PROBLEM SELECTOR PLAN 7
hold home statin Patient seen and evaluated at bedside by MICU and palliative care with ongoing GOC discussion. Made DNR/DNI by daughter

## 2021-03-09 NOTE — ED ADULT NURSE REASSESSMENT NOTE - NS ED NURSE REASSESS COMMENT FT1
2nd unit prbcs started on pt. pt denies complaints at this time. educated of s/sx. daughter remains at bedside. NSR on monitor. VS as noted. pt remains on warming blanket. will continue to monitor.

## 2021-03-09 NOTE — CONSULT NOTE ADULT - PROBLEM SELECTOR RECOMMENDATION 9
decubitus sacral ulcer Stage IV,  vs picc line vs other  profoundly hypotensive:  midodrine 30mg x 1 per MICU team  Per goals of care:  treat reversible causes

## 2021-03-09 NOTE — CONSULT NOTE ADULT - SUBJECTIVE AND OBJECTIVE BOX
CHIEF COMPLAINT: altered mental status    HPI:   66yo F hx metastatic breast CA not currently on tx (previous home hospice), BIBEMS from home for incraesing poor PO, increasing AMS (usually aaox3, now aaox0 but following commands slowly) and hypotensive to 60s and hypoglycemic to 50s at home. Found in triage to also be hypoxic to 80s on RA. Pt cannot give history. Of note usually takes midodrine 10mg q8 but only got one dose today and her normal bp is 90s-100s. at bedside.    In the ED, patient received 2L NS, vanc 1000mg, cefepime 2000mg, and midodrine 10mg (home dose). Labwork notable for Hb of 7.0, lactate of 4.0, UA with small leuk esterase, negative nitrites. CT Abd showing heterogenous endometrial thickening and fluiid, and new masses, with increased metastatic lesions. Patient also started on clindamycin 600. Patient's vitals on exam was 107/60, and levophed was not yet started.     FAMILY HISTORY:  Family history of multiple myeloma (Sibling)    Family history of breast cancer in sister (Sibling)    Family history of sepsis (Mother)        SOCIAL HISTORY:  Allergies    penicillins (Hives)    Intolerances        HOME MEDICATIONS:    REVIEW OF SYSTEMS:  Constitutional:   Eyes:  ENT:  CV:  Resp:  GI:  :  MSK:  Integumentary:  Neurological:  Psychiatric:  Endocrine:  Hematologic/Lymphatic:  Allergic/Immunologic:  [ ] All other systems negative  [x] Unable to assess ROS because generalized fatigue    OBJECTIVE:  ICU Vital Signs Last 24 Hrs  T(C): 36.3 (09 Mar 2021 00:04), Max: 36.3 (09 Mar 2021 00:04)  T(F): 97.4 (09 Mar 2021 00:04), Max: 97.4 (09 Mar 2021 00:04)  HR: 80 (09 Mar 2021 03:27) (80 - 112)  BP: 86/55 (09 Mar 2021 03:27) (77/59 - 98/72)  BP(mean): --  ABP: --  ABP(mean): --  RR: 16 (09 Mar 2021 03:27) (16 - 18)  SpO2: 100% (09 Mar 2021 03:27) (100% - 100%)        CAPILLARY BLOOD GLUCOSE      POCT Blood Glucose.: 97 mg/dL (08 Mar 2021 20:48)      PHYSICAL EXAM:  Gen: cachectic appearing woman, slow to respond but able to answer questions  	HEENT: PERRL, dry mucus membranes, normal conjunctiva, anicteric, neck supple  	Lung: clear to auscultation bilaterally without wheezes, rales, rhonchi.    	CV: RRR, no murmurs  	Abd: soft, non tender, non-distended  	Neuro: Moving all extremity grossly  Skin: Warm and dry, no evidence of rash, sacral decub stage 4 across whole sacrum down to bone w/ granulation tissue as per Ed note    HOSPITAL MEDICATIONS:  MEDICATIONS  (STANDING):  clindamycin IVPB 600 milliGRAM(s) IV Intermittent Once  midodrine. 10 milliGRAM(s) Oral three times a day    MEDICATIONS  (PRN):      LABS:                        7.0    40.73 )-----------( 128      ( 08 Mar 2021 21:12 )             25.1         140  |  104  |  16  ----------------------------<  101<H>  3.1<L>   |  24  |  1.35<H>    Ca    8.4      08 Mar 2021 21:12    TPro  5.3<L>  /  Alb  1.7<L>  /  TBili  1.4<H>  /  DBili  x   /  AST  20  /  ALT  9   /  AlkPhos  223<H>      PT/INR - ( 08 Mar 2021 21:12 )   PT: 20.9 sec;   INR: 1.89 ratio         PTT - ( 08 Mar 2021 21:12 )  PTT:62.5 sec  Urinalysis Basic - ( 09 Mar 2021 01:34 )    Color: Yellow / Appearance: Slightly Turbid / S.021 / pH: x  Gluc: x / Ketone: Negative  / Bili: Negative / Urobili: 6 mg/dL   Blood: x / Protein: 100 mg/dL / Nitrite: Negative   Leuk Esterase: Small / RBC: >10 /HPF / WBC 5-10 /HPF   Sq Epi: x / Non Sq Epi: 0-5 /HPF / Bacteria: Few        Venous Blood Gas:   @ 01:34  7.26/53/43/21/63.8  VBG Lactate: 4.4  Venous Blood Gas:   @ 21:06  7.33/48/30/23/37.5  VBG Lactate: 3.7      MICROBIOLOGY:     RADIOLOGY:  [x] Reviewed and interpreted by me    < from: CT Abdomen and Pelvis w/ IV Cont (21 @ 01:48) >    IMPRESSION: Limited evaluation secondary to streak artifact from the patient's upper extremities as well as bilateral hip prostheses.    New heterogeneous endometrial thickening and fluid in postmenopausal patient is suspicious for endometrial carcinoma until proven otherwise. Recommend direct inspection/tissue biopsy with GYN consultation.    New upper left renal soft tissue mass likely due to primary neoplasm. Recommend further evaluation with ultrasound or MRI.    Interval increase in diffuse sclerotic and lytic metastases in the thoracolumbar spine and left iliac crest.  < end of copied text >    < from: CT Head No Cont (21 @ 01:34) >    IMPRESSION:  No acute intracranial bleeding, mass effect, or shift.  < end of copied text >    EKG: normal sinus rhythm, low voltage

## 2021-03-09 NOTE — H&P ADULT - NSHPPHYSICALEXAM_GEN_ALL_CORE
T(C): 33.1 (03-09-21 @ 12:39), Max: 36.3 (03-09-21 @ 00:04)  HR: 74 (03-09-21 @ 13:46) (69 - 112)  BP: 79/46 (03-09-21 @ 13:46) (66/51 - 116/81)  RR: 19 (03-09-21 @ 13:46) (12 - 22)  SpO2: 97% (03-09-21 @ 13:46) (97% - 100%)    GENERAL: patient appears well, no acute distress, appropriate, pleasant  EYES: sclera clear, no exudates  ENMT: oropharynx clear without erythema, no exudates, moist mucous membranes  NECK: supple, soft, no thyromegaly noted  LUNGS: good air entry bilaterally, clear to auscultation, symmetric breath sounds, no wheezing or rhonchi appreciated  HEART: soft S1/S2, regular rate and rhythm, no murmurs noted, no lower extremity edema  GASTROINTESTINAL: abdomen is soft, nontender, nondistended, normoactive bowel sounds, no palpable masses  INTEGUMENT: good skin turgor, no lesions noted  MUSCULOSKELETAL: no clubbing or cyanosis, no obvious deformity  NEUROLOGIC: awake, alert, oriented x3, good muscle tone in 4 extremities, no obvious sensory deficits  PSYCHIATRIC: mood is good, affect is congruent, linear and logical thought process  HEME/LYMPH: no palpable supraclavicular nodules, no obvious ecchymosis or petechiae T(C): 33.1 (03-09-21 @ 12:39), Max: 36.3 (03-09-21 @ 00:04)  HR: 74 (03-09-21 @ 13:46) (69 - 112)  BP: 79/46 (03-09-21 @ 13:46) (66/51 - 116/81)  RR: 19 (03-09-21 @ 13:46) (12 - 22)  SpO2: 97% (03-09-21 @ 13:46) (97% - 100%)    General: A&O x0-1, NAD  HEENT: Normocephalic, atraumatic, EOMI, anicteric sclerae.  Respiratory: Breathing non-labored.  CVS: Regular rate and rhythm  Abdomen: Soft, nondistended, nontender. No rebound, no guarding,   Back: Large open sacral wound -- majority of wound with pink granulation tissue. Some areas with fibrinous and necrotic tissue debrided sharply at bedside. No crepitus or erythema palpated on surrounding buttocks. Exposed sacral bone.   Neuro: Motor and sensory grossly intact with no focal deficits.  Extremities: Warm bilaterally.  MSK: Intact ROM. T(C): 33.1 (03-09-21 @ 12:39), Max: 36.3 (03-09-21 @ 00:04)  HR: 74 (03-09-21 @ 13:46) (69 - 112)  BP: 79/46 (03-09-21 @ 13:46) (66/51 - 116/81)  RR: 19 (03-09-21 @ 13:46) (12 - 22)  SpO2: 97% (03-09-21 @ 13:46) (97% - 100%)    General: A&O x0-1, NAD  HEENT: Normocephalic, atraumatic, EOMI, anicteric sclerae.  Respiratory: Breathing non-labored.  CVS: Regular rate and rhythm  Abdomen: Soft, nondistended, nontender. No rebound, no guarding,   Neuro: no apparent focal deficits however difficult to assess given altered mental status. T(C): 33.1 (03-09-21 @ 12:39), Max: 36.3 (03-09-21 @ 00:04)  HR: 74 (03-09-21 @ 13:46) (69 - 112)  BP: 79/46 (03-09-21 @ 13:46) (66/51 - 116/81)  RR: 19 (03-09-21 @ 13:46) (12 - 22)  SpO2: 97% (03-09-21 @ 13:46) (97% - 100%)    General: A&O x0-1, NAD  HEENT: Normocephalic, atraumatic, EOMI, anicteric sclerae.  Respiratory: Breathing non-labored.  CVS: Regular rate and rhythm, 2+ edema lower extremities.   Abdomen: Soft, nondistended, nontender. No rebound, no guarding,   Neuro: no apparent focal deficits however difficult to assess given altered mental status.

## 2021-03-09 NOTE — CONSULT NOTE ADULT - SUBJECTIVE AND OBJECTIVE BOX
HPI:  66yo F hx metastatic breast CA not currently on tx (previous home hospice), BIBEMS from home for increasing  poor PO, increasing AMS, currently awake, verbal but very weak, stating " no more medication", following commands slowly  .  In ED found to be  hypotensive to 60s and hypoglycemic to 50s (at home) , additionally  Found to be hypoxic to 80s on RA. Pt cannot give history. Of note usually takes midodrine 10mg q8 but only got one dose today and her normal bp is 90s-100s.  Daughter Meggan at bedside, legal surrogate is patients  Glenny Waldron [phone].        PERTINENT PM/SXH:   Breast cancer    DAMASO (obstructive sleep apnea)    Morbid obesity    HTN (hypertension)      H/O total hip arthroplasty, left    S/P lumpectomy, left breast    No significant past surgical history      FAMILY HISTORY:  Family history of multiple myeloma (Sibling)    Family history of breast cancer in sister (Sibling)    Family history of sepsis (Mother)      ITEMS NOT CHECKED ARE NOT PRESENT    SOCIAL HISTORY:   Significant other/partner[ ]  Children[x ]  Latter day/Spirituality:  Substance hx:  [ ]   Tobacco hx:  [ ]   Alcohol hx: [ ]   Home Opioid hx:  [ ] I-Stop Reference No:  Living Situation: [x ]Home  [ ]Long term care  [ ]Rehab [ ]Other    ADVANCE DIRECTIVES:    DNR DNI  DECISION MAKER(s):  [ ] Health Care Proxy(s)  [X ] Surrogate(s)  [ ] Guardian           Name(s): Phone Number(s):  GLENNY WALDRON  [phone]  BASELINE (I)ADL(s) (prior to admission):  [place]: [ ]Total  [ ] Moderate [X ]Dependent    Allergies    penicillins (Hives)    Intolerances    MEDICATIONS  (STANDING):  midodrine 30 milliGRAM(s) Oral once  midodrine. 10 milliGRAM(s) Oral three times a day    MEDICATIONS  (PRN):    PRESENT SYMPTOMS: [ ]Unable to obtain due to poor mentation   Source if other than patient:  [ ]Family   [ ]Team     Pain: [ ]yes [X ]no  QOL impact -   Location -                    Aggravating factors -  Quality -  Radiation -  Timing-  Severity (0-10 scale):  Minimal acceptable level (0-10 scale):     PAIN AD Score:     http://geriatrictoolkit.[place].[facility]/cog/painad.pdf (press ctrl +  left click to view)    Dyspnea:                           [ X]Mild [ ]Moderate [ ]Severe  Anxiety:                             [X ]Mild [ ]Moderate [ ]Severe  Fatigue:                             [ ]Mild [X ]Moderate [ ]Severe  Nausea:                             [ ]Mild [ ]Moderate [ ]Severe  Loss of appetite:              [ ]Mild [X ]Moderate [ ]Severe  Constipation:                    [ ]Mild [ ]Moderate [ ]Severe    Other Symptoms:  [ ]All other review of systems negative     Palliative Performance Status Version 2:   30   %    http://[facility].org/files/news/palliative_performance_scale_ppsv2.pdf  PHYSICAL EXAM:  Vital Signs Last 24 Hrs  T(C): 33.1 (09 Mar 2021 12:39), Max: 36.3 (09 Mar 2021 00:04)  T(F): 91.6 (09 Mar 2021 12:39), Max: 97.4 (09 Mar 2021 00:04)  HR: 71 (09 Mar 2021 12:39) (69 - 112)  BP: 66/51 (09 Mar 2021 12:39) (66/51 - 116/81)  BP(mean): --  RR: 19 (09 Mar 2021 12:39) (12 - 22)  SpO2: 98% (09 Mar 2021 12:39) (98% - 100%) I&O's Summary    GENERAL:  [ X]Alert  [X ]Oriented x  2-3 [ ]Lethargic  [X ]Cachexia  [ ]Unarousable  [X ]Verbal  [ ]Non-Verbal  Behavioral:   [ ] Anxiety  [ ] Delirium [ ] Agitation [ ] Other  HEENT:  [ ]Normal   [ X]Dry mouth   [ ]ET Tube/Trach  [ ]Oral lesions  PULMONARY:   [ ]Clear [ ]Tachypnea  [ ]Audible excessive secretions   [ ]Rhonchi        [ ]Right [ ]Left [ ]Bilateral  [ ]Crackles        [ ]Right [ ]Left [ ]Bilateral  [ ]Wheezing     [ ]Right [ ]Left [ ]Bilatera  [X ]Diminished breath sounds [ ]right [ ]left [X ]bilateral  poor effort   XCARDIOVASCULAR:    [ ]Regular [ x]Irregular [ ]Tachy  [ ]Darshan [ ]Murmur [ ]Other  GASTROINTESTINAL:  [x ]Soft  [ ]Distended   [x ]+BS  [ ]Non tender [ ]Tender  [ ]PEG [ ]OGT/ NGT  Last BM:   GENITOURINARY:  [ ]Normal [x ] Incontinent   [ ]Oliguria/Anuria   [ ]Hanks  MUSCULOSKELETAL:   [ ]Normal   [ ]Weakness  [ x]Bed/Wheelchair bound [ ]Edema  NEUROLOGIC:   [x ]No focal deficits  [ ]Cognitive impairment  [ ]Dysphagia [ ]Dysarthria [ ]Paresis [ ]Other   SKIN:   [ ]Normal    [ ]Rash  [x ]Pressure ulcer(s)       Present on admission [x ]y [ ]n  Per triage note:  decubitus ulcer     CRITICAL CARE:  [ ] Shock Present  [ x]Septic [x ]Cardiogenic [ ]Neurologic [x ]Hypovolemic  [ ]  Vasopressors [ ]  Inotropes   [ ]Respiratory failure present [ ]Mechanical ventilation [ ]Non-invasive ventilatory support [ ]High flow  [ ]Acute  [ ]Chronic [ x]Hypoxic  [ ]Hypercarbic [ ]Other  [ ]Other organ failure     LABS:                        5.5    40.80 )-----------( 95       ( 09 Mar 2021 06:53 )             19.7   03-08    140  |  104  |  16  ----------------------------<  101<H>  3.1<L>   |  24  |  1.35<H>    Ca    8.4      08 Mar 2021 21:12    TPro  5.3<L>  /  Alb  1.7<L>  /  TBili  1.4<H>  /  DBili  x   /  AST  20  /  ALT  9   /  AlkPhos  223<H>  03-08  PT/INR - ( 08 Mar 2021 21:12 )   PT: 20.9 sec;   INR: 1.89 ratio         PTT - ( 08 Mar 2021 21:12 )  PTT:62.5 sec    Urinalysis Basic - ( 09 Mar 2021 01:34 )    Color: Yellow / Appearance: Slightly Turbid / S.021 / pH: x  Gluc: x / Ketone: Negative  / Bili: Negative / Urobili: 6 mg/dL   Blood: x / Protein: 100 mg/dL / Nitrite: Negative   Leuk Esterase: Small / RBC: >10 /HPF / WBC 5-10 /HPF   Sq Epi: x / Non Sq Epi: 0-5 /HPF / Bacteria: Few      RADIOLOGY & ADDITIONAL STUDIES:    < from: CT Head No Cont (21 @ 01:34) >    INTERPRETATION:  HISTORY: Mental status change .    COMPARISON: None    TECHNIQUE: Axial noncontrast CT images from the skull base to the vertex were obtained and submitted for interpretation. Coronal and sagittal reformatted images were performed. Bone and soft tissue windows were evaluated.    FINDINGS:    There is no acute intracranial mass-effect, hemorrhage, midline shift, or abnormal extra-axial fluid collection. Gray-white differentiation is maintained.    Ventricles, sulci, and cisterns reflect mild parenchymal volume loss relative to the patient's age without hydrocephalus. Basal cisterns are patent.    Visualized paranasal sinuses and mastoid air cells are clear. Calvarium is intact.    IMPRESSION:    No acute intracranial bleeding, mass effect, or shift.            CARLOS LI MD; Resident Radiology  This document has been electronically signed.    < end of copied text >  < from: CT Abdomen and Pelvis w/ IV Cont (21 @ 01:48) >  M:  CT ABDOMEN AND PELVIS IC        PROCEDURE DATE:  Mar  9 2021         INTERPRETATION:  CLINICAL INDICATION: Abdominal pain and fever    COMPARISON: CT abdomen and pelvis 4/3/2019, MRI lumbar spine 19    TECHNIQUE: Axial CT of the abdomen and pelvis was performed after the administration of intravenous contrast. Coronal and sagittal reformatted images were submitted. 90 cc of Omnipaque 350 was injected intravenously without immediate complication. 10 cc were discarded.    FINDINGS:    Evaluation of the lower abdomen and pelvic structures is limited by streak artifact from patient's bilateral hip prostheses. The patient's upper extremities results in streak artifact which obscures evaluation of the intra-abdominal contents.    LOWERCHEST: Small bilateral pleural effusions. Bilateral passive atelectasis including apparent complete collapse of the left lower lobe.    LIVER/GALLBLADDER: Normal size with homogenous enhancement. No biliary ductal dilatation. Unremarkable gallbladder.    PANCREAS: The pancreatic body and head are not well seen.    SPLEEN: Normal in size and enhancement.    KIDNEYS: New soft tissue attenuating left upper pole renal lesion. No hydronephrosis.    ADRENAL GLANDS: Mild thickening and nodularity of theleft adrenal.    BOWEL: Soft tissue defect from sacral decubitus ulceration comes in close proximity to the rectal wall. The appendix is normal. A large hiatal hernia contains most of the stomach which is only partially imaged. No bowel obstruction.    URINARY BLADDER: Incompletely distended and not evaluated.    PELVIC ORGANS: Thickened endometrium.    BONES/SOFT TISSUES:  A soft tissue lesion in the left sacral alae at the anterior superior iliac spine. Redemonstrated lumbar and pelvic sclerotic metastases, increased since prior exam. Degenerative change. Anasarca.    AORTA/MAJOR BRANCHES: Atherosclerotic change.    IMPRESSION: Limited evaluation secondary to streak artifact from the patient's upper extremities as well as bilateral hip prostheses.    New heterogeneous endometrial thickening and fluid in postmenopausal patient is suspicious for endometrial carcinoma until proven otherwise. Recommend direct inspection/tissue biopsy with GYN consultation.    New upper left renal soft tissue mass likely due to primary neoplasm. Recommend further evaluation with ultrasound or MRI.    Interval increase in diffuse sclerotic and lytic metastases in the thoracolumbar spine and left iliac crest.              < end of copied text >      PROTEIN CALORIE MALNUTRITION PRESENT: [ ]mild [ ]moderate [ ]severe [ ]underweight [ ]morbid obesity  https://www.andeal.org/vault/2440/web/files/ONC/Table_Clinical%20Characteristics%20to%20Document%20Malnutrition-White%20JV%20et%20al%580404.pdf    Height (cm): 172.7 (21 @ 19:36)    [ ]PPSV2 < or = to 30% [ ]significant weight loss  [ ]poor nutritional intake  [ ]anasarca     Albumin, Serum: 1.7 g/dL (21 @ 21:12)   [ ]Artificial Nutrition      REFERRALS:   [ ]Chaplaincy  [ ]Hospice  [ ]Child Life  [ ]Social Work  [ ]Case management [ ]Holistic Therapy     Goals of Care Document:

## 2021-03-09 NOTE — ED ADULT NURSE REASSESSMENT NOTE - NS ED NURSE REASSESS COMMENT FT1
Patient received from previous RN. Patient able to state name, month and day of birth and location. Patient reports she is having some epigastric discomfort. Denies any headache, dizziness or other discomfort. Patient appears comfortable. MD Segovia made aware. EKG to be obtained. Cardiac monitor in place. Safety maintained.

## 2021-03-09 NOTE — ED ADULT NURSE REASSESSMENT NOTE - NS ED NURSE REASSESS COMMENT FT1
pt remains confused but calm, daughter remains at bedside. pt A&OX0 at this time. MICU team at bedside assessing pt. vitals as noted. no further interventions needed at this time. pt remains on 6L nasal cannula, will continue to reassess and monitor

## 2021-03-09 NOTE — H&P ADULT - PROBLEM SELECTOR PLAN 2
Metastatic breast cancer diagnosed 2 years prior sp lumpectomy per chart and chemotherapy per Dr. Rosario at [facility]. Off chemotherapy and on hospice. Metastatic breast cancer diagnosed 2 years prior sp lumpectomy per chart and chemotherapy per Dr. Rosario at [facility] [place]. Off chemotherapy and on hospice.  -continue home pain regimen oxycodone 5mg/mL q6h PRN  -methadone 5mg po q12h  -gabapentin 300mg TID Presenting with hypotension improving w IVF in ED and midodrine  -Midodrine 30 TID  -IVF NS@100 3/9 PM for 1.5L   -keep SBP>90, BP at home around  SBP at home  -family at this time is still open to pressors with GOC ongoing

## 2021-03-09 NOTE — CONSULT NOTE ADULT - TREATMENT GUIDELINE COMMENT
Continue medical management , treat reversible causes, utilize pressors if needed  DO NOT RESUSCITATE   DO NOT INTUBATE  MOLST IN CHART

## 2021-03-09 NOTE — H&P ADULT - PROBLEM SELECTOR PLAN 9
DVT PPx: SQH  Diet: Clears can consider speech and swallow eval.   Dispo: Home with hospice pending resolution of medical issues.

## 2021-03-09 NOTE — CONSULT NOTE ADULT - CONVERSATION DETAILS
Spoke with /surrogate , Leonardo Waldron regarding his wife's deteriorating state, dangerously low BP, severe sepsis and CT with interval increase in sclerotic and lytic lesions to thoracolumbar spine and left iliac crest, additionally with renal soft tissue mass possibly related to primary neoplasm.   Patient is DNR DNI as discussed with daughter  Meggan and patients  Leonardo . Main focus at this time is to treat reversible causes including using pressors if needed. Patient was on hospice but decided to take patient to the hospital . We will continue to follow.

## 2021-03-09 NOTE — ED ADULT NURSE REASSESSMENT NOTE - NS ED NURSE REASSESS COMMENT FT1
Received report from PHONG Aldridge. Pt [id number]. Pt noted to have large un-stageable sacral ulcer with tunneling traveling into B/L buttocks. Urine sample sent. Pt changed and repositioned for comfort and brought to CT scan at this time. MD aware of sacral decubiti.

## 2021-03-09 NOTE — H&P ADULT - PROBLEM SELECTOR PLAN 6
DVT PPx: SQH  Diet: Clears can consider speech and swallow eval.   Dispo: Home with hospice pending resolution of medical issues. Hold home hypertension regimen. Metastatic breast cancer diagnosed 2 years prior sp lumpectomy per chart and chemotherapy per Dr. Rosario at [facility] [place]. Off chemotherapy and on hospice.  -continue home pain regimen oxycodone 5mg/mL q6h PRN  -methadone 5mg po q12h  -gabapentin 300mg TID Metastatic breast cancer diagnosed 2 years prior sp lumpectomy per chart and [place] [facility] [place] and on hospice.  -hold  oxycodone 5mg/mL q6h PRN  -methadone 5mg po q12h  -gabapentin 300mg TID

## 2021-03-09 NOTE — CONSULT NOTE ADULT - ATTENDING COMMENTS
pt is a 64 yo female with hx metastatic breast ca s/p chemo  was on home hospice, pt non ambulatory with sacral decubiti,  pic line in place not getting abx, presents with decreased po  intake and feeling weak, noted hypotensive in the er 77/40 , presumed  septic shock, s/p 3 liters ivf, pancultured, iv abx, pt with increase in   bp 104/57, alert and responsive currently with  at bedside,   per  , her wishes are to be dni, pressors can be used,   -sepsis, source decubiti vs pic line,   -suggest panculture, blood, urine, vanco,zosyn  -continue iv f , monitor urine output  -pt is currently not requiring pressors,  -surgical evaluation of the wound  -dvt prophylaxis  -echocardiogram, monitor lv function  -follow up lytes, lactate  pt currently does not require icu level care, please reconsult if condition changes.
Date of service: 03-09-21 @ 18:14    Patient seen and examined  History reviewed  Appreciate goals of care clarifications  Sacral decubitus ulcer, does not appear to be a necrotizing soft tissue infection  Wound care consultation

## 2021-03-09 NOTE — CONSULT NOTE ADULT - ASSESSMENT
64yo F hx metastatic breast CA not currently on tx (previous home hospice), BIBEMS from home for incraesing poor PO, increasing AMS (usually aaox3, now aaox0 but following commands slowly) and hypotensive to 60s and hypoglycemic to 50s at home.  MICU consulted for hypotension 2/2 to sepsis    #Sepsis  -multiple sources of infection, including sacral wounds vs infected picc line  -agree with broad spectrum antibiotics  -please acquire blood cultures  -patient received 2L NS, please given 1L additional LR  -patient received 10mg midodrine, please continue, can also consider increasing midodrine to 30 TID in the setting of sepsis  -Hb of 7.0, consider giving 1u prbcs, which will also help with volume  -keep MAP>65    -Patient not a MICU candidate at this time, please reconsult if SBP is low 90s despite adequate fluid resuscitation and medical optimization  -case to be discussed with Dr. Cynthia Levine, PGY2  Internal Medicine

## 2021-03-09 NOTE — CHART NOTE - NSCHARTNOTEFT_GEN_A_CORE
CHIEF COMPLAINT:  HPI:  64yo F hx metastatic breast CA not currently on tx (previous home hospice), BIBEMS from home for incraesing poor PO, increasing AMS (usually aaox3, now aaox0 but following commands slowly) and hypotensive to 60s and hypoglycemic to 50s at home. Found in triage to also be hypoxic to 80s on RA. Pt cannot give history. Of note usually takes midodrine 10mg q8 but only got one dose today and her normal bp is 90s-100s. at bedside.    In the ED, patient received 2L NS, vanc 1000mg, cefepime 2000mg, and midodrine 10mg (home dose). Labwork notable for Hb of 7.0, lactate of 4.0, UA with small leuk esterase, negative nitrites. CT Abd showing heterogenous endometrial thickening and fluid, and new masses, with increased metastatic lesions. Patient also started on clindamycin 600. Patient's vitals on exam was 107/60, and levophed was not yet started.     MICU reconsulted for hypotension with SBP 84/56. Pt receiving prbc, with SBP response to 100s. Pt admitted to medicine.    PAST MEDICAL & SURGICAL HISTORY:  Breast cancer  s/p lumpectomy    DAMASO (obstructive sleep apnea)  worse in past has had  100  pound weight loss ; refuses CPAP    Morbid obesity  BMI 46    HTN (hypertension)    H/O total hip arthroplasty, left  2018    S/P lumpectomy, left breast  2 years ago        FAMILY HISTORY:  Family history of multiple myeloma (Sibling)    Family history of breast cancer in sister (Sibling)    Family history of sepsis (Mother)        SOCIAL HISTORY:  Smoking: __ packs x ___ years  EtOH Use:  Marital Status:  Occupation:  Recent Travel:  Country of Birth:  Advance Directives:    Allergies    penicillins (Hives)    Intolerances        Home Medications:      REVIEW OF SYSTEMS:  Constitutional:   Eyes:  ENT:  CV:  Resp:  GI:  :  MSK:  Integumentary:  Neurological:  Psychiatric:  Endocrine:  Hematologic/Lymphatic:  Allergic/Immunologic:  [ ] All other systems negative  [ ] Unable to assess ROS because ________    OBJECTIVE:  ICU Vital Signs Last 24 Hrs  T(C): 33.7 (09 Mar 2021 09:50), Max: 36.3 (09 Mar 2021 00:04)  T(F): 92.6 (09 Mar 2021 09:50), Max: 97.4 (09 Mar 2021 00:04)  HR: 70 (09 Mar 2021 09:50) (69 - 112)  BP: 116/81 (09 Mar 2021 09:50) (77/59 - 116/81)  BP(mean): --  ABP: --  ABP(mean): --  RR: 20 (09 Mar 2021 09:50) (12 - 20)  SpO2: 100% (09 Mar 2021 09:50) (100% - 100%)        CAPILLARY BLOOD GLUCOSE      POCT Blood Glucose.: 94 mg/dL (09 Mar 2021 06:50)      PHYSICAL EXAM:  General:   HEENT:   Lymph Nodes:  Neck:   Respiratory:   Cardiovascular:   Abdomen:   Extremities:   Skin:   Neurological:  Psychiatry:    HOSPITAL MEDICATIONS:  MEDICATIONS  (STANDING):  fentaNYL   Patch  25 MICROgram(s)/Hr. 1 Patch Transdermal once  midodrine. 10 milliGRAM(s) Oral three times a day    MEDICATIONS  (PRN):      LABS:                        5.5    40.80 )-----------( 95       ( 09 Mar 2021 06:53 )             19.7     03-08    140  |  104  |  16  ----------------------------<  101<H>  3.1<L>   |  24  |  1.35<H>    Ca    8.4      08 Mar 2021 21:12    TPro  5.3<L>  /  Alb  1.7<L>  /  TBili  1.4<H>  /  DBili  x   /  AST  20  /  ALT  9   /  AlkPhos  223<H>  03-08    PT/INR - ( 08 Mar 2021 21:12 )   PT: 20.9 sec;   INR: 1.89 ratio         PTT - ( 08 Mar 2021 21:12 )  PTT:62.5 sec  Urinalysis Basic - ( 09 Mar 2021 01:34 )    Color: Yellow / Appearance: Slightly Turbid / S.021 / pH: x  Gluc: x / Ketone: Negative  / Bili: Negative / Urobili: 6 mg/dL   Blood: x / Protein: 100 mg/dL / Nitrite: Negative   Leuk Esterase: Small / RBC: >10 /HPF / WBC 5-10 /HPF   Sq Epi: x / Non Sq Epi: 0-5 /HPF / Bacteria: Few    Venous Blood Gas:   @ 06:58  7.28/41/34/18/55.1  VBG Lactate: 7.8  Venous Blood Gas:   @ 01:34  7.26/53/43/21/63.8  VBG Lactate: 4.4  Venous Blood Gas:   @ 21:06  7.33/48/30/23/37.5  VBG Lactate: 3.7      MICROBIOLOGY:     RADIOLOGY:  [ ] Reviewed and interpreted by me    EKG:        ASSESSMENT/PLAN: CHIEF COMPLAINT:  HPI:  66yo F hx metastatic breast CA not currently on tx (previous home hospice), BIBEMS from home for incraesing poor PO, increasing AMS (usually aaox3, now aaox0 but following commands slowly) and hypotensive to 60s and hypoglycemic to 50s at home. Found in triage to also be hypoxic to 80s on RA. Pt cannot give history. Of note usually takes midodrine 10mg q8 but only got one dose today and her normal bp is 90s-100s. at bedside.    In the ED, patient received 2L NS, vanc 1000mg, cefepime 2000mg, and midodrine 10mg (home dose). Labwork notable for Hb of 7.0, lactate of 4.0, UA with small leuk esterase, negative nitrites. CT Abd showing heterogenous endometrial thickening and fluid, and new masses, with increased metastatic lesions. Patient also started on clindamycin 600. Patient's vitals on exam was 107/60, and levophed was not yet started.     MICU reconsulted for hypotension with SBP 84/56. Pt receiving prbc, with SBP response to 100s. Pt admitted to medicine.    PAST MEDICAL & SURGICAL HISTORY:  Breast cancer  s/p lumpectomy    DAMASO (obstructive sleep apnea)  worse in past has had  100  pound weight loss ; refuses CPAP    Morbid obesity  BMI 46    HTN (hypertension)    H/O total hip arthroplasty, left  2018    S/P lumpectomy, left breast  2 years ago    FAMILY HISTORY:  Family history of multiple myeloma (Sibling)    Family history of breast cancer in sister (Sibling)    Family history of sepsis (Mother)    SOCIAL HISTORY:  Smoking: __ packs x ___ years  EtOH Use:  Marital Status:  Occupation:  Recent Travel:  Country of Birth:  Advance Directives:    Allergies    penicillins (Hives)    Intolerances    Home Medications:      REVIEW OF SYSTEMS:  Encephalopathic, unable to obtain    OBJECTIVE:  ICU Vital Signs Last 24 Hrs  T(C): 33.7 (09 Mar 2021 09:50), Max: 36.3 (09 Mar 2021 00:04)  T(F): 92.6 (09 Mar 2021 09:50), Max: 97.4 (09 Mar 2021 00:04)  HR: 70 (09 Mar 2021 09:50) (69 - 112)  BP: 116/81 (09 Mar 2021 09:50) (77/59 - 116/81)  BP(mean): --  ABP: --  ABP(mean): --  RR: 20 (09 Mar 2021 09:50) (12 - 20)  SpO2: 100% (09 Mar 2021 09:50) (100% - 100%)        CAPILLARY BLOOD GLUCOSE      POCT Blood Glucose.: 94 mg/dL (09 Mar 2021 06:50)      PHYSICAL EXAM:  Gen: cachectic appearing woman, slow to respond but able to answer questions  HEENT: PERRL, dry mucus membranes, normal conjunctiva, anicteric, neck supple  Lung: clear to auscultation bilaterally without wheezes, rales, rhonchi.    CV: RRR, no murmurs  Abd: soft, non tender, non-distended  Neuro: Moving all extremity grossly  Skin: per ED documentation: stage 4 sacral decub on whole sacrum down to bone w/ granulation tissue as per Ed note  Ext: marked peripheral edema all ext    HOSPITAL MEDICATIONS:  MEDICATIONS  (STANDING):  fentaNYL   Patch  25 MICROgram(s)/Hr. 1 Patch Transdermal once  midodrine. 10 milliGRAM(s) Oral three times a day    MEDICATIONS  (PRN):      LABS:                        5.5    40.80 )-----------( 95       ( 09 Mar 2021 06:53 )             19.7     03-08    140  |  104  |  16  ----------------------------<  101<H>  3.1<L>   |  24  |  1.35<H>    Ca    8.4      08 Mar 2021 21:12    TPro  5.3<L>  /  Alb  1.7<L>  /  TBili  1.4<H>  /  DBili  x   /  AST  20  /  ALT  9   /  AlkPhos  223<H>  03-08    PT/INR - ( 08 Mar 2021 21:12 )   PT: 20.9 sec;   INR: 1.89 ratio         PTT - ( 08 Mar 2021 21:12 )  PTT:62.5 sec  Urinalysis Basic - ( 09 Mar 2021 01:34 )    Color: Yellow / Appearance: Slightly Turbid / S.021 / pH: x  Gluc: x / Ketone: Negative  / Bili: Negative / Urobili: 6 mg/dL   Blood: x / Protein: 100 mg/dL / Nitrite: Negative   Leuk Esterase: Small / RBC: >10 /HPF / WBC 5-10 /HPF   Sq Epi: x / Non Sq Epi: 0-5 /HPF / Bacteria: Few    Venous Blood Gas:   @ 06:58  7.28/41/34/18/55.1  VBG Lactate: 7.8  Venous Blood Gas:   @ 01:34  7.26/53/43/21/63.8  VBG Lactate: 4.4  Venous Blood Gas:   @ 21:06  7.33/48/30/23/37.5  VBG Lactate: 3.7      MICROBIOLOGY:     RADIOLOGY:  [ ] Reviewed and interpreted by me    EKG:    ASSESSMENT/PLAN:  66yo F hx metastatic breast CA not currently on tx (previous home hospice), BIBEMS from home for incraesing poor PO, increasing AMS (usually aaox3, now aaox0 but following commands slowly) and hypotensive to 60s and hypoglycemic to 50s at home.  MICU consulted for hypotension 2/2 to sepsis    # Septic shock  - Bedside ultrasound with SBP measuring ~15-20 mmHg above automated measurement. Measurement discrepancy likely secondary to body habitus or suboptimal cuff size  - Please start midodrine 30 TID  - Continue with broad spectrum antibiotics; f/u bcx  - Continue goals of care with patient and family; given overall prognosis and that patient was previously on home hospice, IV pressors may not be in line with patient's wishes    Pt not a MICU candidate at this time. Please reconsult as needed.

## 2021-03-09 NOTE — ED ADULT NURSE REASSESSMENT NOTE - NS ED NURSE REASSESS COMMENT FT1
US 20g IV placed by DO Peters. Labs sent as ordered. Patient medicated as ordered. Safety maintained. Patient stable upon walking out of the room.

## 2021-03-09 NOTE — H&P ADULT - ATTENDING COMMENTS
65F with metastatic breast ca, brought in for AMS. In ED patient was found hypotensive, hypoxic, severe sepsis with shock. Sources include UTI (cauti), sacral DU, and PICC. hernandez replaced in ED. Bcx send by ED. Abx started in ED. CT a/p in ED showing progressive metastatic dz. Admit to medicine for medical management. DNI/DNR.     #Acute metabolic encephalopathy - due to sepsis. potential concurrent with increase fentanyl patch given by family at home. slight improvement after IVF and transfusion. c/t monitor.   #Sepsis - will c/w empiric abx with Vanc, Cefepime, Flagyl. f/u Bcx. f/u Ucx. wound consult. MR to assess for OM of sacrum. f/u Bcx to rule-out potential PICC infection. c/w IVF. increase home midodrine   #Anemia - h/o AoCD, require intermittent transfusion. family denied signs of GIB. s/p 2 U PrBC, repeat cbc, transfuse to Hb>7. FOBT, iron studies.   #Hypoxia - currently with 100% sat on RA. prior hypoxia possibly due to poor perfusion from hypotensions. noted b/l Upper and Lower ext edema. doppler to rule-out DVT.    #Hypotension - baseline low nl BP. c/w midodrine, increased per MICU rec. monitor BP. c/w IVF.

## 2021-03-09 NOTE — H&P ADULT - PROBLEM SELECTOR PLAN 3
AOx0-1 in the ED  likely in setting of sepsis. DDx Pulmonary embolism iso malignancy v sepsis w pulmonary source or hypoperfusion   % on 6L nasal cannula in ED  -ABG with lytes to follow pO2   -duplex b/l UE and LE to r/o DVT   -TTE for RV strain   -O2 NC keep O2 greater than 92%.

## 2021-03-09 NOTE — ED ADULT NURSE REASSESSMENT NOTE - NS ED NURSE REASSESS COMMENT FT1
blood infusing as per MD orders. pt remains A&ox3 at this time. remains on warming blanket. NSr on monitor. daughter remains at bedside. will continue to monitor.

## 2021-03-09 NOTE — H&P ADULT - HISTORY OF PRESENT ILLNESS
66yo F hx metastatic breast CA not currently on tx (previous home hospice), BIBEMS from home for incraesing poor PO, increasing AMS (usually aaox3, now aaox0 but following commands slowly) and hypotensive to 60s and hypoglycemic to 50s at home. Found in triage to also be hypoxic to 80s on RA. Pt cannot give history. Of note usually takes midodrine 10mg q8 but only got one dose before presentation and her normal bp is 90s-100s.    In the ED, patient received 2L NS, 1.5L LR, vanc 1000mg, cefepime 2000mg, clindamycin 600mg qd, and midodrine 10mg (home dose). Initial Labwork notable for Hb of 7.0, lactate of 4.0 uptrending to , UA with small leuk esterase, negative nitrites. CT Abd showing heterogenous endometrial thickening and fluiid, and new masses, with increased metastatic lesions.  Patient's vitals on exam was 107/60, and levophed was not yet started on initial MICU eval.     MICU reconsulted for hypotension with SBP 84/56. Pt receiving prbc, with SBP response to 100s. Ongoing goals of care discussion at bedside including MICU and palliative care. Pt family agreed to DNR/DNI.   64yo F hx metastatic breast CA not currently on tx (previous home hospice), BIBEMS from home for incraesing poor PO, increasing AMS (usually aaox3, now aaox0 but following commands slowly) and hypotensive to 60s and hypoglycemic to 50s at home. Of note patient was [place] [facility] [place] for sepsis of uncertain source possibly sacral decubitus ulcer versus UTI. She was sent out on chronic indwelling hernandez catheter and had ongoing wound complications requiring rehospitalization. Patient was made hospice care previously.  Found in triage to also be hypoxic to 80s on RA. Pt cannot give history. Of note usually takes midodrine 10mg q8 but only got one dose before presentation and her normal bp is 90s-100s.    In the ED, patient received 2L NS, 1.5L LR, vanc 1000mg, cefepime 2000mg, clindamycin 600mg qd, and midodrine 10mg (home dose). Initial Labwork notable for Hb of 7.0, lactate of 4.0 uptrending to 7.8 , UA with small leuk esterase, negative nitrites. CT Abd showing heterogenous endometrial thickening and fluiid, and new masses, with increased metastatic lesions.  Patient's vitals on exam was 107/60, and levophed was not yet started on initial MICU eval.     MICU reconsulted for hypotension with SBP 84/56. Pt receiving prbc, with SBP response to 100s. Ongoing goals of care discussion at bedside including MICU and palliative care. Pt family agreed to DNR/DNI.

## 2021-03-09 NOTE — ED ADULT NURSE REASSESSMENT NOTE - NS ED NURSE REASSESS COMMENT FT1
as per night float MD Corona [id number] pt okay to go to floor. BP as noted. pt given midodrine as per orders. as per night float MD Corona [id number] pt okay to go to floor. BP as noted. pt given midodrine as per orders. unable to obtain repeat labs, not receiving blood return, medicine team aware, as per team will try arterial on the floor. as per night float MD Corona [id number] pt okay to go to floor. BP as noted. pt given midodrine as per orders. unable to obtain repeat labs, not receiving blood return, medicine team aware, as per team will try arterial on the floor. MD Corona aware.

## 2021-03-09 NOTE — ED ADULT NURSE REASSESSMENT NOTE - NS ED NURSE REASSESS COMMENT FT1
received report from PHONG Aguilar. Received Pt in room 10. pt A&OX0 at this time, but awake and alert but slow to respond, daughter at bedside. As per daughter pt gets confused often. pt received on 6L nasal cannula, sating 97% at this time, respirations are equal and nonlabored, no respiratory distress noted. NSR on monitor. Pt hypotensive, BP as noted. Medicine team aware, MD Krishna made aware, as per MD medicate pt as per orders and reassess BP. pt with 2 20 gauge iv's to the right arm and PICC line to the right arm. Hanks in place draining dark yellow urine. Pt hypothermic. Pt remains on warming blanket. will continue to monitor and reassess. safety maintained at this time. received report from PHONG Aguilar. Received Pt in room 10. pt A&OX0 at this time, but awake and alert but slow to respond, daughter at bedside. As per daughter pt gets confused often. pt received on 6L nasal cannula, sating 97% at this time, respirations are equal and nonlabored, no respiratory distress noted. NSR on monitor. Pt hypotensive, BP as noted. Medicine team aware, MD Krishna made aware, as per MD medicate pt as per orders and reassess BP. pt with 2 20 gauge iv's to the right arm and PICC line to the right arm. Hanks in place draining dark yellow urine. Pt hypothermic. MICU team at bedside evaluating pt. Pt remains on warming blanket. will continue to monitor and reassess.

## 2021-03-10 LAB
-  STAPHYLOCOCCUS EPIDERMIDIS, METHICILLIN RESISTANT: SIGNIFICANT CHANGE UP
ANION GAP SERPL CALC-SCNC: 13 MMOL/L — SIGNIFICANT CHANGE UP (ref 7–14)
BUN SERPL-MCNC: 18 MG/DL — SIGNIFICANT CHANGE UP (ref 7–23)
CALCIUM SERPL-MCNC: 8.2 MG/DL — LOW (ref 8.4–10.5)
CHLORIDE SERPL-SCNC: 103 MMOL/L — SIGNIFICANT CHANGE UP (ref 98–107)
CO2 SERPL-SCNC: 21 MMOL/L — LOW (ref 22–31)
CREAT SERPL-MCNC: 1.38 MG/DL — HIGH (ref 0.5–1.3)
GLUCOSE SERPL-MCNC: 71 MG/DL — SIGNIFICANT CHANGE UP (ref 70–99)
GRAM STN FLD: SIGNIFICANT CHANGE UP
HCT VFR BLD CALC: 21.8 % — LOW (ref 34.5–45)
HGB BLD-MCNC: 6.3 G/DL — CRITICAL LOW (ref 11.5–15.5)
MAGNESIUM SERPL-MCNC: 1.4 MG/DL — LOW (ref 1.6–2.6)
MCHC RBC-ENTMCNC: 28.9 GM/DL — LOW (ref 32–36)
MCHC RBC-ENTMCNC: 30.4 PG — SIGNIFICANT CHANGE UP (ref 27–34)
MCV RBC AUTO: 105.3 FL — HIGH (ref 80–100)
METHOD TYPE: SIGNIFICANT CHANGE UP
NRBC # BLD: 0 /100 WBCS — SIGNIFICANT CHANGE UP
NRBC # FLD: 0.12 K/UL — HIGH
PHOSPHATE SERPL-MCNC: 3.8 MG/DL — SIGNIFICANT CHANGE UP (ref 2.5–4.5)
PLATELET # BLD AUTO: 109 K/UL — LOW (ref 150–400)
POTASSIUM SERPL-MCNC: 3.4 MMOL/L — LOW (ref 3.5–5.3)
POTASSIUM SERPL-SCNC: 3.4 MMOL/L — LOW (ref 3.5–5.3)
RBC # BLD: 2.07 M/UL — LOW (ref 3.8–5.2)
RBC # FLD: 22.1 % — HIGH (ref 10.3–14.5)
SODIUM SERPL-SCNC: 137 MMOL/L — SIGNIFICANT CHANGE UP (ref 135–145)
WBC # BLD: 50.84 K/UL — CRITICAL HIGH (ref 3.8–10.5)
WBC # FLD AUTO: 50.84 K/UL — CRITICAL HIGH (ref 3.8–10.5)

## 2021-03-10 PROCEDURE — 93970 EXTREMITY STUDY: CPT | Mod: 26

## 2021-03-10 PROCEDURE — 99233 SBSQ HOSP IP/OBS HIGH 50: CPT | Mod: GC

## 2021-03-10 RX ORDER — ALTEPLASE 100 MG
2 KIT INTRAVENOUS ONCE
Refills: 0 | Status: DISCONTINUED | OUTPATIENT
Start: 2021-03-10 | End: 2021-03-10

## 2021-03-10 RX ORDER — POLYETHYLENE GLYCOL 3350 17 G/17G
17 POWDER, FOR SOLUTION ORAL DAILY
Refills: 0 | Status: DISCONTINUED | OUTPATIENT
Start: 2021-03-10 | End: 2021-03-12

## 2021-03-10 RX ORDER — ERTAPENEM SODIUM 1 G/1
INJECTION, POWDER, LYOPHILIZED, FOR SOLUTION INTRAMUSCULAR; INTRAVENOUS
Refills: 0 | Status: DISCONTINUED | OUTPATIENT
Start: 2021-03-10 | End: 2021-03-10

## 2021-03-10 RX ORDER — HYDROMORPHONE HYDROCHLORIDE 2 MG/ML
0.3 INJECTION INTRAMUSCULAR; INTRAVENOUS; SUBCUTANEOUS EVERY 4 HOURS
Refills: 0 | Status: DISCONTINUED | OUTPATIENT
Start: 2021-03-10 | End: 2021-03-12

## 2021-03-10 RX ORDER — SENNA PLUS 8.6 MG/1
2 TABLET ORAL AT BEDTIME
Refills: 0 | Status: DISCONTINUED | OUTPATIENT
Start: 2021-03-10 | End: 2021-03-12

## 2021-03-10 RX ORDER — MAGNESIUM SULFATE 500 MG/ML
2 VIAL (ML) INJECTION ONCE
Refills: 0 | Status: COMPLETED | OUTPATIENT
Start: 2021-03-10 | End: 2021-03-10

## 2021-03-10 RX ORDER — CEFEPIME 1 G/1
2000 INJECTION, POWDER, FOR SOLUTION INTRAMUSCULAR; INTRAVENOUS EVERY 8 HOURS
Refills: 0 | Status: DISCONTINUED | OUTPATIENT
Start: 2021-03-10 | End: 2021-03-10

## 2021-03-10 RX ORDER — CEFEPIME 1 G/1
1000 INJECTION, POWDER, FOR SOLUTION INTRAMUSCULAR; INTRAVENOUS EVERY 8 HOURS
Refills: 0 | Status: DISCONTINUED | OUTPATIENT
Start: 2021-03-10 | End: 2021-03-11

## 2021-03-10 RX ORDER — VANCOMYCIN HCL 1 G
1500 VIAL (EA) INTRAVENOUS EVERY 12 HOURS
Refills: 0 | Status: DISCONTINUED | OUTPATIENT
Start: 2021-03-10 | End: 2021-03-12

## 2021-03-10 RX ADMIN — GABAPENTIN 300 MILLIGRAM(S): 400 CAPSULE ORAL at 06:51

## 2021-03-10 RX ADMIN — METHADONE HYDROCHLORIDE 5 MILLIGRAM(S): 40 TABLET ORAL at 06:47

## 2021-03-10 RX ADMIN — POLYETHYLENE GLYCOL 3350 17 GRAM(S): 17 POWDER, FOR SOLUTION ORAL at 15:26

## 2021-03-10 RX ADMIN — Medication 400 MILLIGRAM(S): at 00:27

## 2021-03-10 RX ADMIN — GABAPENTIN 300 MILLIGRAM(S): 400 CAPSULE ORAL at 21:48

## 2021-03-10 RX ADMIN — Medication 1 APPLICATION(S): at 06:37

## 2021-03-10 RX ADMIN — FENTANYL CITRATE 1 PATCH: 50 INJECTION INTRAVENOUS at 06:32

## 2021-03-10 RX ADMIN — Medication 50 GRAM(S): at 16:13

## 2021-03-10 RX ADMIN — Medication 1000 MILLIGRAM(S): at 00:42

## 2021-03-10 RX ADMIN — FENTANYL CITRATE 1 PATCH: 50 INJECTION INTRAVENOUS at 19:00

## 2021-03-10 RX ADMIN — MIDODRINE HYDROCHLORIDE 40 MILLIGRAM(S): 2.5 TABLET ORAL at 15:25

## 2021-03-10 RX ADMIN — Medication 1 APPLICATION(S): at 00:27

## 2021-03-10 RX ADMIN — MIDODRINE HYDROCHLORIDE 40 MILLIGRAM(S): 2.5 TABLET ORAL at 06:53

## 2021-03-10 RX ADMIN — SENNA PLUS 2 TABLET(S): 8.6 TABLET ORAL at 21:48

## 2021-03-10 RX ADMIN — CEFEPIME 100 MILLIGRAM(S): 1 INJECTION, POWDER, FOR SOLUTION INTRAMUSCULAR; INTRAVENOUS at 15:27

## 2021-03-10 RX ADMIN — Medication 300 MILLIGRAM(S): at 06:33

## 2021-03-10 RX ADMIN — MIDODRINE HYDROCHLORIDE 40 MILLIGRAM(S): 2.5 TABLET ORAL at 21:48

## 2021-03-10 NOTE — GOALS OF CARE CONVERSATION - ADVANCED CARE PLANNING - CONVERSATION DETAILS
Talked with patient's  Leonardo at bedside in regards to prognosis and treatment plan. Patient originally was coming from home hospice and currently working with palliatice care/ to re-instate hospice. Called [facility] to affirm that they are willing to transfer hypotensive patient as long as DNR is documented (in chart). Leonardo stated his goal is to transfer patient home with hospice and allow her to pass at home. Discussed role of pressors in her care and that pressors would require MICU stay and would not reverse her medical course. Leonardo then agreed to not have pressors. Leonardo expressed understanding that patient could pass in the hospital and that he was agreeable to the plan to cap pressors. With further discussion will plan to notify family if demise is eminent to allow them visitation to see patient. However, will continue blood/abx/fluid until home hospice can be arranged.     Plan: will continue abx/blood/fluid/midodrine. NO pressors  If patient further decompensates despite intervention, to notify other family members to come visit patient    Sanya Krishna MD, PGY-2  Internal Medicine  [facility]: [id number]  After 7PM page NF at [phone]

## 2021-03-10 NOTE — MEDICAL STUDENT PROGRESS NOTE(EDUCATION) - NS MD HP STUD ASPLAN PLAN FT
# AMS 2/2 SEPSIS: Unknown source. On admission qSOFA =2, NEWS2 = 12. Blood cultures are pending. Family reports an increase in her fentanyl patch dose which may have contributed to her AMS.      DDX:  - PICC line infection - in place since January 1/20/21  - UTI: Indwelling hernandez cath since January and UA significant for few bacteria and leukocyte esterase. Hernandez was replaced in ED  - PNA: CXR shows L sided pleural effusion v atelectasis but PNA cannot be ruled out.   - Less likely: Osteomylitis 2/2 sacral decubitus ulcer - has been evaluated by surgery and considered to be clean. Unlikely source.  - less Likely: abdominal/pelvic abscess: CT abdomen pelvis shows endometrial thickening suspicious for metastatic disease but no obvious abscess    Plan  - continue empiric abx: IV Vancomycin, IV Metronidazole, IV Cefepime  - provide supportive care: O2 nasal cannula, IVF, heating blanket  - FU blood cultures and urine cultures  - FU MRI  - consider replacing the PICC    # HYPOTENSION: Improving   Found to be hypotensive in the ED via US. Systolic in 80s, diastolic in ??. Normal systolic ranges . Most likely 2/2 septic shock or possibly obstructive shock due to PE. IN She received IVF and Midrodrine which lead to an improvement. PE notable for bilateral upper and lower extremity edema.   - c/w IVF  - c/w Midodrine 40 mg PO q8 hr    #ANEMIA - Improving   Found to be anemic in ED with Hb 5.5. Received 2 units of pRBCs and is now Hb 6.3. Now getting another transfusion. Target Hb > 7. ddx: DIC vs Internal bleed vs anemia of chronic disease  - monitor CBC  - FU FOBT  - FU iron studies      #HYPOXIA - Improved  Upon admission to ED was found to be hypoxic on room air with O2 sat 80%. Given 6L nasal cannula which lead to O2 sat %.  Ddx: PNA, L sided atelectasis, sepsis, PE, hypoperfusion. High risk of PE given metastatic breast cancer and bedbound status.  - continue nasal cannula 6 L  - FU TTE to assess for RV strain 2/2 PE  - FU [facility] duplex arterial US  - whats the other one?         # AMS 2/2 SEPSIS: Unknown source. On admission qSOFA =2, NEWS2 = 12. Blood cultures are pending. Family reports an increase in her fentanyl patch dose which may have contributed to her AMS.      DDX:  - PICC line infection - in place since January 1/20/21  - UTI: Indwelling hernandez cath since January and UA significant for few bacteria and leukocyte esterase. Hernandez was replaced in ED  - PNA: CXR shows L sided pleural effusion v atelectasis but PNA cannot be ruled out.   - Less likely: Osteomylitis 2/2 sacral decubitus ulcer - has been evaluated by surgery and considered to be clean. Unlikely source.  - less Likely: abdominal/pelvic abscess: CT abdomen pelvis shows endometrial thickening suspicious for metastatic disease but no obvious abscess    Plan  - continue empiric abx: IV Vancomycin, IV Metronidazole, IV Cefepime  - provide supportive care: O2 nasal cannula, IVF, heating blanket  - FU blood cultures and urine cultures  - order MRI to assess for osteomyelitis  - consider removing PICC and establishing new IV access    # HYPOTENSION: Improving   Found to be hypotensive in the ED via US. Systolic in 80s, diastolic in 50s. Normal systolic ranges . Most likely 2/2 septic shock or possibly obstructive shock due to PE. IN She received IVF and Midrodrine which lead to an improvement. PE notable for bilateral upper and lower extremity edema.   - c/w IVF  - c/w Midodrine 40 mg PO q8 hr    #ANEMIA - Improving   Found to be anemic in ED with Hb 5.5. Received 2 units of pRBCs and is now Hb 6.3. Now getting another transfusion. Target Hb > 7. ddx: DIC vs Internal bleed vs anemia of chronic disease  - monitor CBC  - FU FOBT  - FU iron studies      #HYPOXIA - Improved  Upon admission to ED was found to be hypoxic on room air with O2 sat 80%. Given 6L nasal cannula which lead to O2 sat %.  Ddx: PNA, L sided atelectasis, sepsis, PE, hypoperfusion. High risk of PE given metastatic breast cancer and bedbound status.  - continue nasal cannula 6 L  - FU TTE to assess for RV strain 2/2 PE  - FU VA duplex arterial US           # AMS 2/2 SEPSIS: Unknown source. On admission qSOFA =2, NEWS2 = 12. Blood cultures are pending. Family reports an increase in her fentanyl patch dose which may have contributed to her AMS. Urine culture positive for gram negative rods. Has been receiving Vancomycin IV, Cefepime IV and Metronidazole IV.     DDX:  - PICC line infection - in place since January 1/20/21  - UTI: Indwelling hernandez cath since January and UA significant for few bacteria and leukocyte esterase. Hernandez was replaced in ED  - PNA: CXR shows L sided pleural effusion v atelectasis but PNA cannot be ruled out.   - Less likely: Osteomylitis 2/2 sacral decubitus ulcer - has been evaluated by surgery and considered to be clean. Unlikely source.  - less Likely: abdominal/pelvic abscess: CT abdomen pelvis shows endometrial thickening suspicious for metastatic disease but no obvious abscess.     Plan  - continue empiric abx: IV Vancomycin (MRSA coverage), IV Cefepime (pseudomonas coverage)  - d/c Metronidazole  - provide supportive care: O2 nasal cannula, IVF, heating blanket  - FU blood cultures  - remove PICC   - establishing new IV access  - FU with Dr. Rosario ([facility]) re: baseline labs    # HYPOTENSION: Improving   Found to be hypotensive in the ED via US. Systolic in 80s, diastolic in 50s. Normal systolic ranges . Most likely 2/2 septic shock or possibly obstructive shock due to PE. IN She received IVF and Midrodrine which lead to an improvement. PE notable for bilateral upper and lower extremity edema.   - c/w IVF  - c/w Midodrine 40 mg PO q8 hr    #ANEMIA - Improving   Found to be anemic in ED with Hb 5.5 - most likely dilutional after IVF. Received 3 units of pRBCs and is now Hb 6.3. Now getting another transfusion. Target Hb > 7. ddx: DIC vs Internal bleed vs anemia of chronic disease  - FU on CBC post transfusion  - order FOBT and iron studies to assess for internal bleed  - add bowel regimen (no BMs since admission)  - hold anticoagulants due to bleeding risk      #HYPOXIA - Improved  Upon admission to ED was found to be hypoxic on room air with O2 sat 80%. Given 6L nasal cannula which lead to O2 sat %. She is currently 100% on room air and does not appear to be in any respiratory distress.   Ddx: PNA, L sided atelectasis, sepsis, PE, hypoperfusion. High risk of PE given metastatic breast cancer and bedbound status. Lower extremity doppler US was calf veins not well visualized but no evidence of DVT  - continue nasal cannula 6 L PRN.  - FU TTE to assess for RV strain 2/2 PE  - hold off on ABG

## 2021-03-10 NOTE — DIETITIAN INITIAL EVALUATION ADULT. - OTHER INFO
66yo F hx metastatic breast CA not currently on tx (previous home hospice) presenting for altered mental status found to be septic from undetermined source now on antibiotics and supportive measures with ongoing GOC discussion.     Spoke with Pt's  who was at bedside, able to provide nutrition hx. Pt was unable to take in adequate nutrition for the past 3 months. Pt has been refusing to eat and Family was attempting to "force" Pt to eat and drink. Pt was also having difficulty swallowing and functional decline and was unable to feed herself. Currently with orders for Clear Liquids. Per Nursing, considering mental status, po intakes maybe difficult.  Family was unable to comment on weight loss and reports Pt had some swelling (presently with generalized edema). Remains with multiple pressure injuries.

## 2021-03-10 NOTE — DISCHARGE NOTE PROVIDER - NSDCMRMEDTOKEN_GEN_ALL_CORE_FT
Dakins Half Strength 0.25% topical solution: Apply topically to sacral wound 2 times a day  everolimus 5 mg oral tablet: 1 tab(s) orally once a day  gabapentin 300 mg oral capsule: 1 cap(s) orally 3 times a day  Lipitor 20 mg oral tablet: 1 tab(s) orally once a day  methadone 5 mg oral tablet: 1 tab(s) orally 2 times a day  oxyCODONE 5 mg/5 mL oral solution: 5 milliliter(s) orally every 6 hours  triamterene-hydrochlorothiazide 37.5 mg-25 mg oral capsule: 1 cap(s) orally once a day   Dakins Half Strength 0.25% topical solution: Apply topically to sacral wound 2 times a day  everolimus 5 mg oral tablet: 1 tab(s) orally once a day  gabapentin 300 mg oral capsule: 1 cap(s) orally 3 times a day  methadone 5 mg oral tablet: 1 tab(s) orally 2 times a day  midodrine 10 mg oral tablet: 1 tab(s) orally 3 times a day  oxyCODONE 5 mg/5 mL oral solution: 5 milliliter(s) orally every 6 hours   Dakins Half Strength 0.25% topical solution: Apply topically to sacral wound 2 times a day  everolimus 5 mg oral tablet: 1 tab(s) orally once a day  gabapentin 300 mg oral capsule: 1 cap(s) orally 3 times a day  methadone 5 mg oral tablet: 1 tab(s) orally 2 times a day  oxyCODONE 5 mg/5 mL oral solution: 5 milliliter(s) orally every 6 hours

## 2021-03-10 NOTE — DIETITIAN INITIAL EVALUATION ADULT. - PROBLEM SELECTOR PLAN 5
Per surgery- Large open sacral wound -- majority of wound with pink granulation tissue. Some areas with fibrinous and necrotic tissue debrided sharply at bedside. No crepitus or erythema palpated on surrounding buttocks. Exposed sacral bone.  -Can dress sacral wound with Dakins soaked wet to dry dressing. Recommend wound care consult for additional dressing change recommendations   -Wound care nurse consult placed.

## 2021-03-10 NOTE — PROVIDER CONTACT NOTE (CRITICAL VALUE NOTIFICATION) - ASSESSMENT
Growth in aerobic bottle Gram Positive Cocci
Pt is responding at baseline, no signs if distress noted

## 2021-03-10 NOTE — DIETITIAN INITIAL EVALUATION ADULT. - ORAL INTAKE PTA/DIET HISTORY
Per Family at bedside-----Pt has been with inadequate po intakes, refusing to eat for > 1week prior to admission  Pt was provided with soft meals, as well as puddings, ice cream yogurt.

## 2021-03-10 NOTE — DIETITIAN INITIAL EVALUATION ADULT. - PROBLEM SELECTOR PLAN 6
Metastatic breast cancer diagnosed 2 years prior sp lumpectomy per chart and [place] [facility] [place] and on hospice.  -hold  oxycodone 5mg/mL q6h PRN  -methadone 5mg po q12h  -gabapentin 300mg TID

## 2021-03-10 NOTE — DISCHARGE NOTE PROVIDER - DETAILS OF MALNUTRITION DIAGNOSIS/DIAGNOSES
This patient has been assessed with a concern for Malnutrition and was treated during this hospitalization for the following Nutrition diagnosis/diagnoses:     -  03/10/2021: Severe protein-calorie malnutrition   This patient has been assessed with a concern for Malnutrition and was treated during this hospitalization for the following Nutrition diagnosis/diagnoses:     -  03/10/2021: Severe protein-calorie malnutrition    This patient has been assessed with a concern for Malnutrition and was treated during this hospitalization for the following Nutrition diagnosis/diagnoses:     -  03/10/2021: Severe protein-calorie malnutrition   This patient has been assessed with a concern for Malnutrition and was treated during this hospitalization for the following Nutrition diagnosis/diagnoses:     -  03/10/2021: Severe protein-calorie malnutrition    This patient has been assessed with a concern for Malnutrition and was treated during this hospitalization for the following Nutrition diagnosis/diagnoses:     -  03/10/2021: Severe protein-calorie malnutrition    This patient has been assessed with a concern for Malnutrition and was treated during this hospitalization for the following Nutrition diagnosis/diagnoses:     -  03/10/2021: Severe protein-calorie malnutrition   This patient has been assessed with a concern for Malnutrition and was treated during this hospitalization for the following Nutrition diagnosis/diagnoses:     -  03/10/2021: Severe protein-calorie malnutrition    This patient has been assessed with a concern for Malnutrition and was treated during this hospitalization for the following Nutrition diagnosis/diagnoses:     -  03/10/2021: Severe protein-calorie malnutrition    This patient has been assessed with a concern for Malnutrition and was treated during this hospitalization for the following Nutrition diagnosis/diagnoses:     -  03/10/2021: Severe protein-calorie malnutrition    This patient has been assessed with a concern for Malnutrition and was treated during this hospitalization for the following Nutrition diagnosis/diagnoses:     -  03/10/2021: Severe protein-calorie malnutrition

## 2021-03-10 NOTE — ADVANCED PRACTICE NURSE CONSULT - REASON FOR CONSULT
Attempted to see patient after wound care referral received. Patient off unit for test.  at bedside. Discussed with primary RN to please contact [facility] service line when patient returns to unit. Will follow up.

## 2021-03-10 NOTE — ADVANCED PRACTICE NURSE CONSULT - REASON FOR CONSULT
Patient seen on skin care rounds after wound care referral received for assessment of skin impairment and recommendations of topical management. Chart reviewed:  Chart reviewed:  Patient with hx metastatic breast CA not currently on tx (previous home hospice) presenting for altered mental status found to be septic from undetermined source now on antibiotics and supportive measures with ongoing GOC discussion. Admitted with sacral Stage IV pressure injury s/p sharp debridement by general surgery, with no s/s of acute infection and no s/s of necrotizing fascitis confirmed on CT scan. Admitted with PICC line. Pt profoundly hypotensive, acute encephalopathy, MICU was consulted, patient was not an ICU candidate recommendations made to treat reversible causes. CT with interval increase cclerotic and lytic mets to thoracolumbar spine and iliac crest, renal soft tissue mass, endometrial thickening.  Pt DNR/DNI. Current labs WBC 50.84 k/uL, H/H 6.3/21.8, Plt 109, (-) DVT B/L LE. BMI 36.0 kg/m2, Madhu 12.  remains at bedside, interviewed. As per  patient lives at home with hospital bed, has [facility] 7 days a week from 8:30am-11:30am, and visiting nurse twice a week. Sacrum was previously treated with Dakin's solution and was recently changed to wet-to-dry dressing with NS, as a once a day dressing change. Dressing performed by VNS and by patient's daughter who resided in the same home. As per  patient has been increasing weak and lethargic for apx 1 week and has not had an appetite since Monday. Requires total assistance with ADLs. Patient has been seen and followed by Palliative care.

## 2021-03-10 NOTE — ADVANCED PRACTICE NURSE CONSULT - ASSESSMENT
General: Pt obtunded, withdraws to pain, unable to follow commands. Patient receiving PRBC at time of encounter, RN at bedside, patient remains hypotensive primary Team aware. Right upper extremity PICC line in place, dressing is half off with sanguinous drainage, RUE with (+) erythema; primary team called to bedside plan to remove PICC line; (PICC was removed by MD at end of encounter today). Pt bedbound, (+) anasarca. Indwelling hernandez catheter in place, incontinent of stool. Skin warm, dry with increased moisture in intertriginous folds, adequate skin turgor.    Risk for moisture associated dermatitis to bilateral breasts and abdominal pannus and upper inner thighs- increased moisture with chronic hyperpigmentation, skin intact.    Sacrum extending to bilateral buttocks extending to bilateral posterior thighs- patient turned to left side- Stage 4 pressure injury- 18.5cmx14.5cmx3.5cm (patient in natural anatomical side lying position, wound was not held open for measurements), undermining from 12 - 4 o'clock ranging from 3.5cm - 6.5cm with 6.5cm from 1 - 2 o'clock. 40% fragile/friable bone exposed, 10% tan-moist firmly attached slough at center of wound base and along wound edge from 7- 10 o'clock, 50% pink moist adipose tissue. Small-moderate sero-fibrinous drainage, no purulence noted. (+) mild odor (patient seen with N95 and surgical mask with face shield in place). Periwound skin with satellite lesion to bilateral posterior thighs, largest overlying right posterior thigh  by 3cm of epidermis, measuring [id number] exposing pale-pink moist adipose tissue, with small serous drainage. Remaining periwound skin with no induration, no increased warmth, no edema, no erythema. Of note patients rectum is at 6 o'clock with minimal epidermis between anus and wound, attempted but unable to apply external rectal pouch while maintain adequate seal due to anatomy.  Goals of care: discussion with patient's  at bedside, patient at high risk for infection secondary to extensive sacral wound, recommendations for Dakin's moistened kerlix for antimicrobial support and to pack dead space. Discussed that to minimize pain and discomfort recommendations would be made for daily dressing change (as opposed to twice a day). Discussed that due to condition and decreased PO intake, the wound is not likely to improve, goals of care are to focus on symptom management and prevention/topical management of infection.  was able to teach back and was in agreement with recommendations. OF note Palliative care MD was at bedside upon completion of assessment.

## 2021-03-10 NOTE — DIETITIAN INITIAL EVALUATION ADULT. - PERTINENT MEDS FT
MEDICATIONS  (STANDING):  cefepime   IVPB 2000 milliGRAM(s) IV Intermittent every 8 hours  Dakins Solution - 1/2 Strength 1 Application(s) Topical two times a day  gabapentin 300 milliGRAM(s) Oral three times a day  magnesium sulfate  IVPB 2 Gram(s) IV Intermittent once  midodrine 40 milliGRAM(s) Oral every 8 hours  vancomycin  IVPB      vancomycin  IVPB 1500 milliGRAM(s) IV Intermittent every 12 hours

## 2021-03-10 NOTE — ADVANCED PRACTICE NURSE CONSULT - RECOMMEDATIONS
Follow up Palliative care consult notes.  Nutrition consult, patient with extensive stage 4 pressure injury decrease PO intake, previously on hospice care.    Topical recommendations:    Risk for moisture associated dermatitis- Cleanse intertriginous folds with luke-warm soap and water. Dry well. Apply Interdry textile sheeting, under intertriginous folds leaving 2 inches exposed at ends to wick, remove to wash & dry affected area, then replace. Individual sheeting may be used for up to 5 days unless soiled.     Sacrum extending to bilateral buttocks and posterior thighs- Cleanse wound with Dakins 0.125% solution, rinse wound and periwound skin with NS. Pat dry. Apply Liquid barrier film to periwound skin. Pack wound with Dakins 0.125% moistened Kerlix. Cover with dry 4x4 gauze, abdominal pads secure with large tegaderm or paper tape. Change daily and prn with episodes of incontinence.     Continue low air loss bed therapy, continue heel elevation with Z-flex fluidized positioning boots, continue to turn & reposition q2h with Z-maru fluidized positioning device, continue moisture management as recommended above & single breathable pad, continue measures to decrease friction/shear/pressure.     Continue with nutritional support as per dietary/orders.    Findings and plan discussed with patient's , primary RN, and primary team MD from team 8. All questions and concerns addressed.    Please contact Wound Care Service Line if we can be of further assistance (ext [phone]).

## 2021-03-10 NOTE — PROGRESS NOTE ADULT - PROBLEM SELECTOR PLAN 1
sp vancomycin, cefepime, and clindamycin in the ED  CXR left pleural effusion v atelectasis and consolidation cannot be ruled out  UE with few bacteria and small LE and negative nitrite  Blood cultures x2 sent  Sacral decubitus ulcer evaluated by general surgery unlikely to be source clean on debridement.   CT abd pelvis with endometrial thickening suspicious for metastatic disease no obvious abscess  - PICC removal today. has been in place since admission to [facility] [place] 1/2021  -empiric vanc 1g q12h and vanc level pre 4th dose Day 1 (3/9- )  -cefepime 2g q8h Day 1 (3/8 PM- )  -metronidazole Day 1 (3/9- ) sp vancomycin, cefepime, and clindamycin in the ED  CXR left pleural effusion v atelectasis and consolidation cannot be ruled out  UE with few bacteria and small LE and negative nitrite  Blood cultures x2 sent  Sacral decubitus ulcer evaluated by general surgery unlikely to be source clean on debridement.   CT abd pelvis with endometrial thickening suspicious for metastatic disease no obvious abscess  - PICC removal. [place] [facility] [place]  -empiric vanc 1g q12h and vanc level pre 4th dose Day 1 (3/9- )  -cefepime 2g q8h Day 1 (3/8 PM- )  -metronidazole 1/1 (3/9-3/10 )

## 2021-03-10 NOTE — MEDICAL STUDENT PROGRESS NOTE(EDUCATION) - NS MD HP STUD ASPLAN ASSES FT
66 YO F on hospice care for metastatic breast cancer currently untreated BIB EMS for AMS and found to be hypotensive, hypothermic and anemic in the ED. CT abdomen and pelvis showed progressive metastatic disease. She has been receiving supportive care and abx in suspicion of sepsis from an unknown source. She is DNR/DNI.

## 2021-03-10 NOTE — DISCHARGE NOTE PROVIDER - NSDCCPCAREPLAN_GEN_ALL_CORE_FT
PRINCIPAL DISCHARGE DIAGNOSIS  Diagnosis: Sepsis  Assessment and Plan of Treatment: You came to the hospital because your family was concerend about your health. You were then found to have an infection in both your urine and in your blood. You were then given blood, antibiotics, and medications by mouth to keep your blood pressure higher. You were then made DNR/DNI with the goal to return you to hospice in order to allow your family to accompany you on your final phase of life      SECONDARY DISCHARGE DIAGNOSES  Diagnosis: ACP (advance care planning)  Assessment and Plan of Treatment: ACP (advance care planning)  We had a lengthy discussion with your family members to address goals of care. Your family decided to focus on allowing you to go home to be able to spend the most time with your family as possible. You were then made DNR/DNI with the goal to return you to hospice in order to allow your family to accompany you on your final phase of life

## 2021-03-10 NOTE — DIETITIAN INITIAL EVALUATION ADULT. - PROBLEM SELECTOR PLAN 7
Patient seen and evaluated at bedside by MICU and palliative care with ongoing GOC discussion. Made DNR/DNI by daughter

## 2021-03-10 NOTE — DIETITIAN INITIAL EVALUATION ADULT. - PROBLEM SELECTOR PLAN 3
DDx Pulmonary embolism iso malignancy v sepsis w pulmonary source or hypoperfusion   % on 6L nasal cannula in ED  -ABG with lytes to follow pO2   -duplex b/l UE and LE to r/o DVT   -TTE for RV strain   -O2 NC keep O2 greater than 92%.

## 2021-03-10 NOTE — DISCHARGE NOTE PROVIDER - HOSPITAL COURSE
HPI    66yo F hx metastatic breast CA not currently on tx (previous home hospice), BIBEMS from home for incraesing poor PO, increasing AMS (usually aaox3, now aaox0 but following commands slowly) and hypotensive to 60s and hypoglycemic to 50s at home. Of note patient was previously admitted at [facility] [place] in the past year for sepsis of uncertain source possibly sacral decubitus ulcer versus UTI. She was sent out on chronic indwelling hernandez catheter and had ongoing wound complications requiring rehospitalization. Patient was made hospice care previously.  Found in triage to also be hypoxic to 80s on RA. Pt cannot give history. Of note usually takes midodrine 10mg q8 but only got one dose before presentation and her normal bp is 90s-100s.    In the ED, patient received 2L NS, 1.5L LR, vanc 1000mg, cefepime 2000mg, clindamycin 600mg qd, and midodrine 10mg (home dose). Initial Labwork notable for Hb of 7.0, lactate of 4.0 uptrending to 7.8 , UA with small leuk esterase, negative nitrites. CT Abd showing heterogenous endometrial thickening and fluiid, and new masses, with increased metastatic lesions.  Patient's vitals on exam was 107/60, and levophed was not yet started on initial MICU eval.     MICU reconsulted for hypotension with SBP 84/56. Pt receiving prbc, with SBP response to 100s. Ongoing goals of care discussion at bedside including MICU and palliative care. Pt family agreed to DNR/DNI.     Hospital Course  Patient was found to have methicillin resistant Staph epidedemis in her blood culture and pseudomonas in her urine. Patient was continued on abx therapy. Patient also was receiving blood products for her anemia. The patient's  Leonardo agreed to set up home hospice with arrangements being made to send her to home hospice.    Patient then ______ HPI    66yo F hx metastatic breast CA not currently on tx (previous home hospice), BIBEMS from home for incraesing poor PO, increasing AMS (usually aaox3, now aaox0 but following commands slowly) and hypotensive to 60s and hypoglycemic to 50s at home. Of note patient was previously [place] [facility] [place] in the [place] for sepsis of uncertain source possibly sacral decubitus ulcer versus UTI. She was sent out on chronic indwelling hernandez catheter and had ongoing wound complications requiring rehospitalization. Patient was made hospice care previously.  Found in triage to also be hypoxic to 80s on RA. Pt cannot give history. Of note usually takes midodrine 10mg q8 but only got one dose before presentation and her normal bp is 90s-100s.    In the ED, patient received 2L NS, 1.5L LR, vanc 1000mg, cefepime 2000mg, clindamycin 600mg qd, and midodrine 10mg (home dose). Initial Labwork notable for Hb of 7.0, lactate of 4.0 uptrending to 7.8 , UA with small leuk esterase, negative nitrites. CT Abd showing heterogenous endometrial thickening and fluiid, and new masses, with increased metastatic lesions.  Patient's vitals on exam was 107/60, and levophed was not yet started on initial MICU eval.     MICU reconsulted for hypotension with SBP 84/56. Pt receiving prbc, with SBP response to 100s. Ongoing goals of care discussion at bedside including MICU and palliative care. Pt family agreed to DNR/DNI.     Hospital Course  Patient was found to have methicillin resistant Staph epidedemis in her blood culture and pseudomonas in her urine. Patient was continued on abx therapy. Patient also was receiving blood products for her anemia. The patient's  Leonardo agreed to set up home hospice with arrangements being made to send her to home hospice   HPI    66yo F hx metastatic breast CA not currently on tx (previous home hospice), BIBEMS from home for incraesing poor PO, increasing AMS (usually aaox3, now aaox0 but following commands slowly) and hypotensive to 60s and hypoglycemic to 50s at home. Of note patient was previously [place] at [facility] [place] in [place] for sepsis of uncertain source possibly sacral decubitus ulcer versus UTI. She was sent out on chronic indwelling heranndez catheter and had ongoing wound complications requiring rehospitalization. Patient was made hospice care previously.  Found in triage to also be hypoxic to 80s on RA. Pt cannot give history. Of note usually takes midodrine 10mg q8 but only got one dose before presentation and her normal bp is 90s-100s.    In the ED, patient received 2L NS, 1.5L LR, vanc 1000mg, cefepime 2000mg, clindamycin 600mg qd, and midodrine 10mg (home dose). Initial Labwork notable for Hb of 7.0, lactate of 4.0 uptrending to 7.8 , UA with small leuk esterase, negative nitrites. CT Abd showing heterogenous endometrial thickening and fluiid, and new masses, with increased metastatic lesions.  Patient's vitals on exam was 107/60, and levophed was not yet started on initial MICU eval.     MICU reconsulted for hypotension with SBP 84/56. Pt receiving prbc, with SBP response to 100s. Ongoing goals of care discussion at bedside including MICU and palliative care. Pt family agreed to DNR/DNI.     Hospital Course  Patient was found to have methicillin resistant Staph epidedemis in her blood culture and pseudomonas in her urine. Patient was continued on abx therapy. Patient also was receiving blood products for her anemia. The patient's  Leonardo agreed to set up home hospice with arrangements being made to send her to home hospice. Family wishes to transition to PO abx, but no PO abx based on sensitivity. Discussed with , who agrees with stopping abx, as it will not change patient's ultimate outcome.

## 2021-03-10 NOTE — MEDICAL STUDENT PROGRESS NOTE(EDUCATION) - SUBJECTIVE AND OBJECTIVE BOX
SUBJECTIVE:      MEDICATIONS  (STANDING):  cefepime   IVPB 2000 milliGRAM(s) IV Intermittent every 8 hours  Dakins Solution - 1/2 Strength 1 Application(s) Topical two times a day  gabapentin 300 milliGRAM(s) Oral three times a day  methadone    Tablet 5 milliGRAM(s) Oral two times a day  metroNIDAZOLE  IVPB 500 milliGRAM(s) IV Intermittent every 8 hours  midodrine 40 milliGRAM(s) Oral every 8 hours  sodium chloride 0.9%. 1000 milliLiter(s) (100 mL/Hr) IV Continuous <Continuous>  vancomycin  IVPB      vancomycin  IVPB 1500 milliGRAM(s) IV Intermittent every 12 hours    MEDICATIONS  (PRN):      ICU Vital Signs Last 24 Hrs  T(C): 36.3 (10 Mar 2021 06:29), Max: 36.4 (09 Mar 2021 18:46)  T(F): 97.3 (10 Mar 2021 06:29), Max: 97.5 (09 Mar 2021 18:46)  HR: 89 (10 Mar 2021 06:29) (69 - 89)  BP: 81/53 (10 Mar 2021 06:29) (66/51 - 116/81)  BP(mean): --  ABP: --  ABP(mean): --  RR: 16 (10 Mar 2021 06:29) (12 - 23)  SpO2: 100% (10 Mar 2021 06:29) (96% - 100%)  on 6 L O2    PHYSICAL EXAM  General: A&O x0-1, NAD  HEENT: Normocephalic, atraumatic, EOMI, anicteric sclerae.  Respiratory: Breathing non-labored.  CVS: Regular rate and rhythm, 2+ edema lower extremities.   Abdomen: Soft, nondistended, nontender. No rebound, no guarding,   Neuro: no apparent focal deficits however difficult to assess given altered me      LABS                          6.3    50.84 )-----------( 109      ( 10 Mar 2021 05:50 )             21.8       03-10    137  |  103  |  18  ----------------------------<  71  3.4<L>   |  21<L>  |  1.38<H>    Ca    8.2<L>      10 Mar 2021 05:50  Phos  3.8     03-10  Mg     1.4     03-10    eGFR: 46    TPro  5.3<L>  /  Alb  1.7<L>  /  TBili  1.4<H>  /  DBili  x   /  AST  20  /  ALT  9   /  AlkPhos  223<H>  03-08              Urinalysis Basic - ( 09 Mar 2021 01:34 )    Color: Yellow / Appearance: Slightly Turbid / S.021 / pH: x  Gluc: x / Ketone: Negative  / Bili: Negative / Urobili: 6 mg/dL   Blood: x / Protein: 100 mg/dL / Nitrite: Negative   Leuk Esterase: Small / RBC: >10 /HPF / WBC 5-10 /HPF   Sq Epi: x / Non Sq Epi: 0-5 /HPF / Bacteria: Few        PT/INR - ( 08 Mar 2021 21:12 )   PT: 20.9 sec;   INR: 1.89 ratio         PTT - ( 08 Mar 2021 21:12 )  PTT:62.5 sec    Lactate Trend  Venous Lactate = 7.8      CAPILLARY BLOOD GLUCOSE      POCT Blood Glucose.: 94 mg/dL (09 Mar 2021 06:50)         SUBJECTIVE:  BPs some episodes of hypotension overnight.     MEDICATIONS  (STANDING):  cefepime   IVPB 2000 milliGRAM(s) IV Intermittent every 8 hours  Dakins Solution - 1/2 Strength 1 Application(s) Topical two times a day  gabapentin 300 milliGRAM(s) Oral three times a day  methadone    Tablet 5 milliGRAM(s) Oral two times a day  metroNIDAZOLE  IVPB 500 milliGRAM(s) IV Intermittent every 8 hours  midodrine 40 milliGRAM(s) Oral every 8 hours  sodium chloride 0.9%. 1000 milliLiter(s) (100 mL/Hr) IV Continuous <Continuous>  vancomycin  IVPB      vancomycin  IVPB 1500 milliGRAM(s) IV Intermittent every 12 hours    MEDICATIONS  (PRN):      ICU Vital Signs Last 24 Hrs  T(C): 36.3 (10 Mar 2021 06:29), Max: 36.4 (09 Mar 2021 18:46)  T(F): 97.3 (10 Mar 2021 06:29), Max: 97.5 (09 Mar 2021 18:46)  HR: 89 (10 Mar 2021 06:29) (70 - 89)  BP: 81/53 (10 Mar 2021 06:29) (66/51 - 116/81)  BP(mean): --  ABP: --  ABP(mean): --  RR: 16 (10 Mar 2021 06:29) (12 - 23)  SpO2: 100% (10 Mar 2021 06:29) (96% - 100%)      PHYSICAL EXAM  General: A&O x0-1, NAD  HEENT: Normocephalic, atraumatic, EOMI, anicteric sclerae.  Respiratory: Breathing non-labored.  CVS: Regular rate and rhythm, 2+ edema lower extremities.   Abdomen: Soft, nondistended, nontender. No rebound, no guarding,   Neuro: no apparent focal deficits however difficult to assess given altered me      LABS                          6.3    50.84 )-----------( 109      ( 10 Mar 2021 05:50 )             21.8       03-10    137  |  103  |  18  ----------------------------<  71  3.4<L>   |  21<L>  |  1.38<H>    Ca    8.2<L>      10 Mar 2021 05:50  Phos  3.8     03-10  Mg     1.4     03-10    eGFR: 46    TPro  5.3<L>  /  Alb  1.7<L>  /  TBili  1.4<H>  /  DBili  x   /  AST  20  /  ALT  9   /  AlkPhos  223<H>  03-08              Urinalysis Basic - ( 09 Mar 2021 01:34 )    Color: Yellow / Appearance: Slightly Turbid / S.021 / pH: x  Gluc: x / Ketone: Negative  / Bili: Negative / Urobili: 6 mg/dL   Blood: x / Protein: 100 mg/dL / Nitrite: Negative   Leuk Esterase: Small / RBC: >10 /HPF / WBC 5-10 /HPF   Sq Epi: x / Non Sq Epi: 0-5 /HPF / Bacteria: Few        PT/INR - ( 08 Mar 2021 21:12 )   PT: 20.9 sec;   INR: 1.89 ratio         PTT - ( 08 Mar 2021 21:12 )  PTT:62.5 sec    Lactate Trend  Venous Lactate = 7.8      CAPILLARY BLOOD GLUCOSE      POCT Blood Glucose.: 94 mg/dL (09 Mar 2021 06:50)         SUBJECTIVE:  BPs some episodes of hypotension overnight.     MEDICATIONS  (STANDING):  cefepime   IVPB 2000 milliGRAM(s) IV Intermittent every 8 hours  Dakins Solution - 1/2 Strength 1 Application(s) Topical two times a day  gabapentin 300 milliGRAM(s) Oral three times a day  methadone    Tablet 5 milliGRAM(s) Oral two times a day  metroNIDAZOLE  IVPB 500 milliGRAM(s) IV Intermittent every 8 hours  midodrine 40 milliGRAM(s) Oral every 8 hours  sodium chloride 0.9%. 1000 milliLiter(s) (100 mL/Hr) IV Continuous <Continuous>  vancomycin  IVPB      vancomycin  IVPB 1500 milliGRAM(s) IV Intermittent every 12 hours    MEDICATIONS  (PRN):      ICU Vital Signs Last 24 Hrs  T(C): 36.3 (10 Mar 2021 06:29), Max: 36.4 (09 Mar 2021 18:46)  T(F): 97.3 (10 Mar 2021 06:29), Max: 97.5 (09 Mar 2021 18:46)  HR: 89 (10 Mar 2021 06:29) (70 - 89)  BP: 81/53 (10 Mar 2021 06:29) (66/51 - 116/81)  BP(mean): --  ABP: --  ABP(mean): --  RR: 16 (10 Mar 2021 06:29) (12 - 23)  SpO2: 100% (10 Mar 2021 06:29) (96% - 100%)      PHYSICAL EXAM  General: A&O x0-1, NAD  HEENT: Normocephalic, atraumatic, EOMI, anicteric sclerae.  Respiratory: Breathing non-labored.  CVS: Regular rate and rhythm, 2+ edema lower extremities.   Abdomen: Soft, nondistended, nontender. No rebound, no guarding,   Neuro: no apparent focal deficits however difficult to assess given altered me      LABS                          6.3  (L)  50.84 )-----------( 109  (L)    ( 10 Mar 2021 05:50 )              21.8       03-10    137  |  103  |  18  ----------------------------<  71  3.4<L>   |  21<L>  |  1.38<H> (Cr)  K                CO2    Ca    8.2<L>      10 Mar 2021 05:50  Phos  3.8     -10  Mg     1.4     -10    eGFR: 46 (L)    TPro  5.3<L>  /  Alb  1.7<L>  /  TBili  1.4<H>  /  DBili  x   /  AST  20  /  ALT  9   /  AlkPhos  223<H>                Urinalysis Basic - ( 09 Mar 2021 01:34 )    Color: Yellow / Appearance: Slightly Turbid / S.021 / pH: x  Gluc: x / Ketone: Negative  / Bili: Negative / Urobili: 6 mg/dL   Blood: x / Protein: 100 mg/dL / Nitrite: Negative   Leuk Esterase: Small / RBC: >10 /HPF / WBC 5-10 /HPF   Sq Epi: x / Non Sq Epi: 0-5 /HPF / Bacteria: Few    PT/INR - ( 08 Mar 2021 21:12 )   PT: 20.9 sec;   INR: 1.89 ratio         PTT - ( 08 Mar 2021 21:12 )  PTT:62.5 sec    Lactate Trend  Venous Lactate = 7.8      CAPILLARY BLOOD GLUCOSE      POCT Blood Glucose.: 94 mg/dL (09 Mar 2021 06:50)         SUBJECTIVE:  BPs some episodes of hypotension overnight. No complaints of pain this AM per  at bedside. Continues to be AOx0.     MEDICATIONS  (STANDING):  cefepime   IVPB 2000 milliGRAM(s) IV Intermittent every 8 hours  Dakins Solution - 1/2 Strength 1 Application(s) Topical two times a day  gabapentin 300 milliGRAM(s) Oral three times a day  methadone    Tablet 5 milliGRAM(s) Oral two times a day  metroNIDAZOLE  IVPB 500 milliGRAM(s) IV Intermittent every 8 hours  midodrine 40 milliGRAM(s) Oral every 8 hours  sodium chloride 0.9%. 1000 milliLiter(s) (100 mL/Hr) IV Continuous <Continuous>  vancomycin  IVPB      vancomycin  IVPB 1500 milliGRAM(s) IV Intermittent every 12 hours    MEDICATIONS  (PRN):      ICU Vital Signs Last 24 Hrs  T(C): 36.3 (10 Mar 2021 06:29), Max: 36.4 (09 Mar 2021 18:46)  T(F): 97.3 (10 Mar 2021 06:29), Max: 97.5 (09 Mar 2021 18:46)  HR: 89 (10 Mar 2021 06:29) (70 - 89)  BP: 81/53 (10 Mar 2021 06:29) (66/51 - 116/81)  BP(mean): --  ABP: --  ABP(mean): --  RR: 16 (10 Mar 2021 06:29) (12 - 23)  SpO2: 100% (10 Mar 2021 06:29) (96% - 100%)      PHYSICAL EXAM  General: A&O x0-1, NAD  HEENT: Normocephalic, atraumatic, EOMI, anicteric sclerae.  Respiratory: Breathing non-labored.  CVS: Regular rate and rhythm, 2+ edema lower extremities.   Abdomen: Soft, nondistended, nontender. No rebound, no guarding,   Neuro: no apparent focal deficits however difficult to assess given altered me      LABS                          6.3  (L)  50.84 )-----------( 109  (L)    ( 10 Mar 2021 05:50 )              21.8       03-10    137  |  103  |  18  ----------------------------<  71  3.4<L>   |  21<L>  |  1.38<H> (Cr)  K                CO2    Ca    8.2<L>      10 Mar 2021 05:50  Phos  3.8     -10  Mg     1.4     03-10    eGFR: 46 (L)    TPro  5.3<L>  /  Alb  1.7<L>  /  TBili  1.4<H>  /  DBili  x   /  AST  20  /  ALT  9   /  AlkPhos  223<H>                Urinalysis Basic - ( 09 Mar 2021 01:34 )    Color: Yellow / Appearance: Slightly Turbid / S.021 / pH: x  Gluc: x / Ketone: Negative  / Bili: Negative / Urobili: 6 mg/dL   Blood: x / Protein: 100 mg/dL / Nitrite: Negative   Leuk Esterase: Small / RBC: >10 /HPF / WBC 5-10 /HPF   Sq Epi: x / Non Sq Epi: 0-5 /HPF / Bacteria: Few    PT/INR - ( 08 Mar 2021 21:12 )   PT: 20.9 sec;   INR: 1.89 ratio         PTT - ( 08 Mar 2021 21:12 )  PTT:62.5 sec    Lactate Trend  Venous Lactate = 7.8      CAPILLARY BLOOD GLUCOSE      POCT Blood Glucose.: 94 mg/dL (09 Mar 2021 06:50)    Urine Culture Results:   50,000 - 99,000 CFU/mL Gram Negative Rods (21 @ 10:09)    Real-time grayscale and color Duplex (3/10/21)  Summary/Impressions:  Technically difficult and limited study.  No evidence of deep vein thrombosis in the visualized  right and left lower extremities. The bilateral calf veins  were not well visualized.

## 2021-03-10 NOTE — PROGRESS NOTE ADULT - PROBLEM SELECTOR PLAN 6
Metastatic breast cancer diagnosed 2 years prior sp lumpectomy per chart and chemotherapy per Dr. Rosario at [facility]. Off chemotherapy and on hospice.  -hold  oxycodone 5mg/mL q6h PRN  -methadone 5mg po q12h  -gabapentin 300mg TID. Metastatic breast cancer diagnosed 2 years prior sp lumpectomy per chart and chemotherapy per Dr. Rosario at [facility] [place]. Off chemotherapy and on hospice.  -hold  oxycodone 5mg/mL q6h PRN  -Hold methadone.   -gabapentin 300mg TID.

## 2021-03-11 LAB
-  AMIKACIN: SIGNIFICANT CHANGE UP
-  AZTREONAM: SIGNIFICANT CHANGE UP
-  CEFEPIME: SIGNIFICANT CHANGE UP
-  CEFTAZIDIME: SIGNIFICANT CHANGE UP
-  CIPROFLOXACIN: SIGNIFICANT CHANGE UP
-  GENTAMICIN: SIGNIFICANT CHANGE UP
-  IMIPENEM: SIGNIFICANT CHANGE UP
-  LEVOFLOXACIN: SIGNIFICANT CHANGE UP
-  MEROPENEM: SIGNIFICANT CHANGE UP
-  PIPERACILLIN/TAZOBACTAM: SIGNIFICANT CHANGE UP
-  TOBRAMYCIN: SIGNIFICANT CHANGE UP
CULTURE RESULTS: SIGNIFICANT CHANGE UP
METHOD TYPE: SIGNIFICANT CHANGE UP
ORGANISM # SPEC MICROSCOPIC CNT: SIGNIFICANT CHANGE UP
ORGANISM # SPEC MICROSCOPIC CNT: SIGNIFICANT CHANGE UP
SPECIMEN SOURCE: SIGNIFICANT CHANGE UP

## 2021-03-11 PROCEDURE — 99233 SBSQ HOSP IP/OBS HIGH 50: CPT

## 2021-03-11 RX ORDER — ATORVASTATIN CALCIUM 80 MG/1
1 TABLET, FILM COATED ORAL
Qty: 0 | Refills: 0 | DISCHARGE

## 2021-03-11 RX ORDER — TRIAMTERENE/HYDROCHLOROTHIAZID 75 MG-50MG
1 TABLET ORAL
Qty: 0 | Refills: 0 | DISCHARGE

## 2021-03-11 RX ORDER — MEROPENEM 1 G/30ML
1000 INJECTION INTRAVENOUS EVERY 12 HOURS
Refills: 0 | Status: DISCONTINUED | OUTPATIENT
Start: 2021-03-11 | End: 2021-03-12

## 2021-03-11 RX ORDER — MEROPENEM 1 G/30ML
INJECTION INTRAVENOUS
Refills: 0 | Status: DISCONTINUED | OUTPATIENT
Start: 2021-03-11 | End: 2021-03-12

## 2021-03-11 RX ORDER — MEROPENEM 1 G/30ML
1000 INJECTION INTRAVENOUS ONCE
Refills: 0 | Status: COMPLETED | OUTPATIENT
Start: 2021-03-11 | End: 2021-03-11

## 2021-03-11 RX ADMIN — Medication 300 MILLIGRAM(S): at 07:19

## 2021-03-11 RX ADMIN — GABAPENTIN 300 MILLIGRAM(S): 400 CAPSULE ORAL at 13:28

## 2021-03-11 RX ADMIN — GABAPENTIN 300 MILLIGRAM(S): 400 CAPSULE ORAL at 06:38

## 2021-03-11 RX ADMIN — Medication 1 APPLICATION(S): at 17:33

## 2021-03-11 RX ADMIN — GABAPENTIN 300 MILLIGRAM(S): 400 CAPSULE ORAL at 22:19

## 2021-03-11 RX ADMIN — MIDODRINE HYDROCHLORIDE 40 MILLIGRAM(S): 2.5 TABLET ORAL at 06:38

## 2021-03-11 RX ADMIN — SENNA PLUS 2 TABLET(S): 8.6 TABLET ORAL at 22:20

## 2021-03-11 RX ADMIN — Medication 1 APPLICATION(S): at 03:26

## 2021-03-11 RX ADMIN — FENTANYL CITRATE 1 PATCH: 50 INJECTION INTRAVENOUS at 11:46

## 2021-03-11 RX ADMIN — HYDROMORPHONE HYDROCHLORIDE 0.3 MILLIGRAM(S): 2 INJECTION INTRAMUSCULAR; INTRAVENOUS; SUBCUTANEOUS at 01:36

## 2021-03-11 RX ADMIN — POLYETHYLENE GLYCOL 3350 17 GRAM(S): 17 POWDER, FOR SOLUTION ORAL at 13:28

## 2021-03-11 RX ADMIN — MIDODRINE HYDROCHLORIDE 40 MILLIGRAM(S): 2.5 TABLET ORAL at 13:28

## 2021-03-11 RX ADMIN — HYDROMORPHONE HYDROCHLORIDE 0.3 MILLIGRAM(S): 2 INJECTION INTRAMUSCULAR; INTRAVENOUS; SUBCUTANEOUS at 01:04

## 2021-03-11 RX ADMIN — MIDODRINE HYDROCHLORIDE 40 MILLIGRAM(S): 2.5 TABLET ORAL at 22:19

## 2021-03-11 RX ADMIN — Medication 300 MILLIGRAM(S): at 17:32

## 2021-03-11 RX ADMIN — CEFEPIME 100 MILLIGRAM(S): 1 INJECTION, POWDER, FOR SOLUTION INTRAMUSCULAR; INTRAVENOUS at 06:38

## 2021-03-11 RX ADMIN — MEROPENEM 100 MILLIGRAM(S): 1 INJECTION INTRAVENOUS at 16:41

## 2021-03-11 RX ADMIN — FENTANYL CITRATE 1 PATCH: 50 INJECTION INTRAVENOUS at 06:35

## 2021-03-11 NOTE — DISCHARGE NOTE NURSING/CASE MANAGEMENT/SOCIAL WORK - PATIENT PORTAL LINK FT
You can access the FollowMyHealth Patient Portal offered by [facility] by registering at the following website: [email or web address]. By joining [employer]’s FollowMyHealth portal, you will also be able to view your health information using other applications (apps) compatible with our system.

## 2021-03-11 NOTE — CHART NOTE - NSCHARTNOTEFT_GEN_A_CORE
Events noted.  Discussed blood draws with daughter Meggan, agrees no further blood draws at this time.   Daughter at bedside who witnessed multiple unsuccesful attempts .  Patient to be discharged with [facility].   aware and working to get the service re established.

## 2021-03-11 NOTE — CHART NOTE - NSCHARTNOTEFT_GEN_A_CORE
Patient w/ hemoglobin 6.3 on 3/10 AM labs, receiving 1 unit pRBC (over 3 hours) at ~noon, with activation order placed for post-transfusion CBC. Patient's post-transfusion CBC in EMR recorded as "pending collection." Provider-to-RN order placed at 20:21: "If not already completed, please draw and send patient's post-transfusion CBC, and text page [phone] when sent. Thanks." Discussed this with patient's RN, who relayed that patient is a hard-stick and difficult to obtain this blood sample. Discussed further with RN, and explained that the author would attempt an arterial stick for post-transfusion CBC; but explained however that it would take some time, and that in the interim, RN could have the supplies ready by bedside.     After responding to a medical rapid response, while returning pages and placing orders for House Staff patients, the author received pages to #[phone] at 10:34, 10:34, 10:35, and 10:35pm. Page was immediately returned, with an explanation that the post-transfusion CBC was not urgent.     Of note, EMR reflects improvement of BP after transfusion in the afternoon, from 80/47 to 104/44. Patient stable with no signs or evidence of acute bleed.    Attempted arterial stick for patient without success. Discussed with RT for assistance, who will attempt arterial stick. Will follow-up.    -Cj Leyva MD Patient w/ hemoglobin 6.3 on 3/10 AM labs, receiving 1 unit pRBC (over 3 hours) at ~noon, with activation order placed for post-transfusion CBC. Patient's post-transfusion CBC in EMR recorded as "pending collection." Provider-to-RN order placed at 20:21: "If not already completed, please draw and send patient's post-transfusion CBC, and text page [phone] when sent. Thanks." Discussed this with patient's RN, who relayed that patient is a hard-stick and difficult to obtain this blood sample. Discussed further with RN, and explained that the author would attempt an arterial stick for post-transfusion CBC; but explained however that it would take some time, and that in the interim, RN could have the supplies ready by bedside.     After responding to a medical rapid response, while returning pages and placing orders for House Staff patients, the author received pages to #[phone] at 10:34, 10:34, 10:35, and 10:35pm. Page was immediately returned, with an explanation that the post-transfusion CBC was not urgent. Of note, EMR reflected improvement of BP after transfusion in the afternoon, from 80/47 to 104/44. Patient stable with no signs or evidence of acute bleed.    Attempted arterial stick for patient without success. Discussed with RT for assistance, who will attempt arterial stick. Will follow-up.    -Cj Leyva MD

## 2021-03-11 NOTE — PROGRESS NOTE ADULT - PROBLEM SELECTOR PLAN 1
found to have MR kevin epi bacteremia and pseudomonas UTI  -Sacral decubitus ulcer evaluated by general surgery unlikely to be source clean on debridement.   CT abd pelvis with endometrial thickening suspicious for metastatic disease no obvious abscess  - PICC removed. has been in place since admission to [facility] [place] 1/2021  -empiric vanc1g q12h and vanc level pre 4th dose Day 3 (3/9- )  -cefepime 2g q8h Day 4 (3/8 PM- ), now decreased to 1g q8 due to renal function   -metronidazole 1/1 (3/9-3/10 )

## 2021-03-11 NOTE — MEDICAL STUDENT PROGRESS NOTE(EDUCATION) - NS MD HP STUD ASPLAN PLAN FT
# AMS 2/2 SEPSIS: Unknown source. On admission qSOFA =2, NEWS2 = 12. 2 blood cultures sent: one positive for methicillin resistant staph epididermis and urine culture positive for pseudomonas.  Family reports an increase in her fentanyl patch dose which may have contributed to her AMS. Urine culture positive for gram negative rods. Has been receiving Vancomycin IV, Cefepime IV and Metronidazole IV (now d/c). Spoke with Nurse at [facility] who provided baseline labs obtained on 1/26/21 prior to discharge: WBC was 19.2.     DDX:  - most likely - 2/2 s. epi infection 2/2 PICC line infection - in place since January 1/20/21 and removed on 3/10/21.   - UTI: urine culture positive for pseudomonas. Indwelling hernandez cath since January and UA significant for few bacteria and leukocyte esterase. Hernandez was replaced in ED.   - Less likely PNA: CXR shows L sided pleural effusion v atelectasis but PNA cannot be ruled out.   - Less likely: Osteomylitis 2/2 sacral decubitus ulcer - has been evaluated by surgery and considered to be clean. Unlikely source.  - less Likely: abdominal/pelvic abscess: CT abdomen pelvis shows endometrial thickening suspicious for metastatic disease but no obvious abscess.     Plan  - continue empiric abx: IV Vancomycin (MRSA coverage), IV Cefepime (pseudomonas coverage)  - provide supportive care: O2 nasal cannula, IVF, heating blanket  - establishing new IV access  - FU with Dr. Rosario ([facility]) re: baseline labs    # HYPOTENSION: Improving   Found to be hypotensive in the ED via US. Systolic in 80s, diastolic in 50s. Normal systolic ranges . Venous Doppler US showed no evidence of DVT. Most likely 2/2 septic shock. In ED She received IVF and Midrodrine which lead to an improvement. PE notable for bilateral upper and lower extremity edema.   - c/w IVF  - c/w Midodrine 40 mg PO q8 hr    #ANEMIA - Improving   Confirmed to be anemic at baseline (Hb 9 on 1/26/21). Found to be anemic in ED with Hb 5.5 - most likely dilutional after IVF. Received 3 units of pRBCs which resulted in Hb 6.3 on 3/10 in AM. Received another transfusion in PM on 3/10, cbc still pending. Target Hb > 7. ddx: DIC vs Internal bleed vs anemia of chronic disease  - FU on CBC post transfusion  - order FOBT and iron studies to assess for internal bleed  - add bowel regimen (no BMs since admission)  - hold anticoagulants due to bleeding risk      #HYPOXIA - Improved  Upon admission to ED was found to be hypoxic on room air with O2 sat 80%. Given 6L nasal cannula which lead to O2 sat %.  CXR shows L sided pleural effusion v atelectasis but PNA cannot be ruled out. She is currently 100% on room air and does not appear to be in any respiratory distress. However, hypoperfusion makes this measurement unreliable. High risk of PE given metastatic breast cancer and bedbound status but Doppler US showed no evidence of DVT, making PE less likely.  Ddx: sepsis (most likely), PNA, L sided atelectasis, PE, hypoperfusion.  - continue nasal cannula 6 L PRN.  - cancel order for TTE to assess for RV strain 2/2 PE  - hold off on ABG   # AMS 2/2 SEPSIS: Unknown source. On admission qSOFA =2, NEWS2 = 12. 2 blood cultures sent: one positive for methicillin resistant staph epididermis and urine culture positive for pseudomonas.  Family reports an increase in her fentanyl patch dose which may have contributed to her AMS. Urine culture positive for gram negative rods. Has been receiving Vancomycin IV, Cefepime IV and Metronidazole IV (now d/c). Spoke with Nurse at [facility] [place] who provided baseline labs obtained on 1/26/21 prior to discharge: WBC was 19.2.     DDX:  - most likely - 2/2 s. epi infection 2/2 PICC line infection - in place since January 1/20/21 and removed on 3/10/21.   - UTI: urine culture positive for pseudomonas. Indwelling hernandez cath since January and UA significant for few bacteria and leukocyte esterase. Hernandez was replaced in ED.   - Less likely PNA: CXR shows L sided pleural effusion v atelectasis but PNA cannot be ruled out.   - Less likely: Osteomylitis 2/2 sacral decubitus ulcer - has been evaluated by surgery and considered to be clean. Unlikely source.  - less Likely: abdominal/pelvic abscess: CT abdomen pelvis shows endometrial thickening suspicious for metastatic disease but no obvious abscess.     Plan  - continue empiric abx: IV Vancomycin (MRSA coverage), IV Cefepime (pseudomonas coverage)  - provide supportive care: O2 nasal cannula, IVF, heating blanket  - establishing new IV access via US. Should use left arm.  - FU with Dr. Rosario ([facility] [place]) re: baseline labs    # HYPOTENSION: Improving   Found to be hypotensive in the ED via US. Systolic in 80s, diastolic in 50s. Normal systolic ranges . Venous Doppler US showed no evidence of DVT. Most likely 2/2 septic shock. In ED She received IVF and Midrodrine which lead to an improvement. PE notable for bilateral upper and lower extremity edema.   - c/w IVF  - c/w Midodrine 40 mg PO q8 hr    #ANEMIA - Improving   Confirmed to be anemic at baseline (Hb 9 on 1/26/21). Found to be anemic in ED with Hb 5.5 - most likely dilutional after IVF. Received 3 units of pRBCs which resulted in Hb 6.3 on 3/10 in AM. Received another transfusion in PM on 3/10, cbc still pending. Target Hb > 7. ddx: DIC vs Internal bleed vs anemia of chronic disease  - FU on CBC post transfusion  - order FOBT and iron studies to assess for internal bleed  - add bowel regimen (no BMs since admission)  - hold anticoagulants due to bleeding risk      #HYPOXIA - Improved  Upon admission to ED was found to be hypoxic on room air with O2 sat 80%. Given 6L nasal cannula which lead to O2 sat %.  CXR shows L sided pleural effusion v atelectasis but PNA cannot be ruled out. She is currently 100% on room air and does not appear to be in any respiratory distress. However, hypoperfusion makes this measurement unreliable. High risk of PE given metastatic breast cancer and bedbound status but Doppler US showed no evidence of DVT, making PE less likely.  Ddx: sepsis (most likely), PNA, L sided atelectasis, PE, hypoperfusion.  - continue nasal cannula 6 L PRN.  - cancel order for TTE to assess for RV strain 2/2 PE  - hold off on ABG

## 2021-03-11 NOTE — PROGRESS NOTE ADULT - PROBLEM SELECTOR PLAN 6
Metastatic breast cancer diagnosed 2 years prior sp lumpectomy per chart and chemotherapy per Dr. Rosario at [facility]. Off chemotherapy and on hospice.  -gabapentin 300mg TID  -appreciate palliative recs for pain control, on dilaudid prn

## 2021-03-11 NOTE — MEDICAL STUDENT PROGRESS NOTE(EDUCATION) - NS MD HP STUD ASPLAN ASSES FT
66 YO F on hospice care for metastatic breast cancer currently untreated BIB EMS for AMS and found to be hypotensive, hypothermic and anemic in the ED. CT abdomen and pelvis showed progressive metastatic disease. Serum  She has been receiving supportive care and abx in suspicion of sepsis from an unknown source. She is DNR/DNI. 66 YO F on hospice care for metastatic breast cancer currently untreated BIB EMS for AMS and found to be hypotensive, hypothermic and anemic in the ED. CT abdomen and pelvis showed progressive metastatic disease. Serum  She has been receiving supportive care and abx for sepsis in the setting of gram positive bacteremia most likely 2/2 PICC infection. She is DNR/DNI and ongoing GOC discussions are being had with the family. 64 YO F on hospice care for metastatic renal cancer currently untreated BIB EMS for AMS and found to be hypotensive, hypothermic and anemic in the ED. CT abdomen and pelvis showed progressive metastatic disease. Serum  She has been receiving supportive care and abx for sepsis in the setting of gram positive bacteremia most likely 2/2 PICC infection. She is DNR/DNI and ongoing GOC discussions are being had with the family.

## 2021-03-11 NOTE — MEDICAL STUDENT PROGRESS NOTE(EDUCATION) - SUBJECTIVE AND OBJECTIVE BOX
SUBJECTIVE:       MEDICATIONS  (STANDING):  cefepime   IVPB 1000 milliGRAM(s) IV Intermittent every 8 hours  Dakins Solution - 1/2 Strength 1 Application(s) Topical two times a day  gabapentin 300 milliGRAM(s) Oral three times a day  midodrine 40 milliGRAM(s) Oral every 8 hours  polyethylene glycol 3350 17 Gram(s) Oral daily  senna 2 Tablet(s) Oral at bedtime  vancomycin  IVPB 1500 milliGRAM(s) IV Intermittent every 12 hours    MEDICATIONS  (PRN):  HYDROmorphone  Injectable 0.3 milliGRAM(s) IV Push every 4 hours PRN Severe Pain (7 - 10)      ICU Vital Signs Last 24 Hrs  T(C): 36.6 (11 Mar 2021 06:11), Max: 36.6 (10 Mar 2021 21:43)  T(F): 97.9 (11 Mar 2021 06:11), Max: 97.9 (11 Mar 2021 06:11)  HR: 55 (11 Mar 2021 06:11) (55 - 62)  BP: 105/48 (11 Mar 2021 06:11) (80/47 - 105/48)  BP(mean): --  ABP: --  ABP(mean): --  RR: 17 (11 Mar 2021 06:11) (17 - 17)  SpO2: 100% (11 Mar 2021 06:11) (99% - 100%)    I&O's Detail    10 Mar 2021 07:01  -  11 Mar 2021 07:00  --------------------------------------------------------  IN:    PRBCs (Packed Red Blood Cells): 300 mL  Total IN: 300 mL    OUT:  Total OUT: 0 mL    Total NET: 300 mL    PHYSICAL EXAM  General: A&O x0-1, NAD  HEENT: Normocephalic, atraumatic, EOMI, anicteric sclerae.  Respiratory: Breathing non-labored.  CVS: Regular rate and rhythm, 2+ edema lower extremities.   Abdomen: Soft, nondistended, nontender. No rebound, no guarding,   Neuro: no apparent focal deficits however difficult to assess given altered me      LABS    Culture - Blood (03.09.21 @ 08:07)   Gram Positive Cocci in Clusters    -  Staphylococcus epidermidis, Methicillin resistant: Detec    Specimen Source: .Blood Blood-Peripheral    Culture - Urine (03.09.21 @ 10:09)  50,000 - 99,000 CFU/mL Pseudomonas aeruginosa    CBC Full  -  ( 10 Mar 2021 05:50 )  WBC Count : 50.84 K/uL  Hemoglobin : 6.3 g/dL  Hematocrit : 21.8 %  Platelet Count - Automated : 109 K/uL  Mean Cell Volume : 105.3 fL    Venous Duplex US  Summary/Impressions:  Technically difficult and limited study.  No evidence of deep vein thrombosis in the visualized  right and left lower extremities. The bilateral calf veins  were not well visualized.    Summary/Impressions:  Technically difficult and limited study.  No evidence of deep vein thrombosis in the visualized  right and left upper extremities.     SUBJECTIVE:  Yesterday afternoon, after infusion, unable to draw blood so arterial stick was attempted but was unsuccessful. Her  agreed to home hospice and  is making a new referral so she can be re-accepted. Transport is willing to take her home as long as she is documented as DNR in the chart.    MEDICATIONS  (STANDING):  cefepime   IVPB 1000 milliGRAM(s) IV Intermittent every 8 hours  Dakins Solution - 1/2 Strength 1 Application(s) Topical two times a day  gabapentin 300 milliGRAM(s) Oral three times a day  midodrine 40 milliGRAM(s) Oral every 8 hours  polyethylene glycol 3350 17 Gram(s) Oral daily  senna 2 Tablet(s) Oral at bedtime  vancomycin  IVPB 1500 milliGRAM(s) IV Intermittent every 12 hours    MEDICATIONS  (PRN):  HYDROmorphone  Injectable 0.3 milliGRAM(s) IV Push every 4 hours PRN Severe Pain (7 - 10)      ICU Vital Signs Last 24 Hrs  T(C): 36.6 (11 Mar 2021 06:11), Max: 36.6 (10 Mar 2021 21:43)  T(F): 97.9 (11 Mar 2021 06:11), Max: 97.9 (11 Mar 2021 06:11)  HR: 55 (11 Mar 2021 06:11) (55 - 62)  BP: 105/48 (11 Mar 2021 06:11) (80/47 - 105/48)  BP(mean): --  ABP: --  ABP(mean): --  RR: 17 (11 Mar 2021 06:11) (17 - 17)  SpO2: 100% (11 Mar 2021 06:11) (99% - 100%)    I&O's Detail    10 Mar 2021 07:01  -  11 Mar 2021 07:00  --------------------------------------------------------  IN:    PRBCs (Packed Red Blood Cells): 300 mL  Total IN: 300 mL    OUT:  Total OUT: 0 mL    Total NET: 300 mL    PHYSICAL EXAM  General: A&O x0-1, NAD  HEENT: Normocephalic, atraumatic, EOMI, anicteric sclerae.  Respiratory: Breathing non-labored.  CVS: Regular rate and rhythm, 2+ edema lower extremities.   Abdomen: Soft, nondistended, nontender. No rebound, no guarding,   Neuro: no apparent focal deficits however difficult to assess given altered me      LABS    Culture - Blood (03.09.21 @ 08:07)   Gram Positive Cocci in Clusters    -  Staphylococcus epidermidis, Methicillin resistant: Detec    Specimen Source: .Blood Blood-Peripheral    Culture - Blood (03.09.21 @ 08:07)    Specimen Source: .Blood Blood-Peripheral    Culture Results:   No growth to date.        Culture - Urine (03.09.21 @ 10:09)  50,000 - 99,000 CFU/mL Pseudomonas aeruginosa    CBC Full  -  ( 10 Mar 2021 05:50 )  WBC Count : 50.84 K/uL  Hemoglobin : 6.3 g/dL  Hematocrit : 21.8 %  Platelet Count - Automated : 109 K/uL  Mean Cell Volume : 105.3 fL    [place] [facility] [place] (prior to [place])  WBC: 19.2  Hb: 9  plts: 110    Venous Duplex US  Summary/Impressions:  Technically difficult and limited study.  No evidence of deep vein thrombosis in the visualized  right and left lower extremities. The bilateral calf veins  were not well visualized.    Summary/Impressions:  Technically difficult and limited study.  No evidence of deep vein thrombosis in the visualized  right and left upper extremities.     SUBJECTIVE:  Yesterday afternoon, after infusion, unable to draw blood so arterial stick was attempted but was unsuccessful. Her  agreed to home hospice and  made new referral pending acceptance. Transport is willing to take her home as long as she is documented as DNR in the chart. Last night she was able to recognize her daughter but not this morning. She is becoming less responsive. Sleeping peacefully this morning.    MEDICATIONS  (STANDING):  cefepime   IVPB 1000 milliGRAM(s) IV Intermittent every 8 hours  Dakins Solution - 1/2 Strength 1 Application(s) Topical two times a day  gabapentin 300 milliGRAM(s) Oral three times a day  midodrine 40 milliGRAM(s) Oral every 8 hours  polyethylene glycol 3350 17 Gram(s) Oral daily  senna 2 Tablet(s) Oral at bedtime  vancomycin  IVPB 1500 milliGRAM(s) IV Intermittent every 12 hours    MEDICATIONS  (PRN):  HYDROmorphone  Injectable 0.3 milliGRAM(s) IV Push every 4 hours PRN Severe Pain (7 - 10)      ICU Vital Signs Last 24 Hrs  T(C): 36.6 (11 Mar 2021 06:11), Max: 36.6 (10 Mar 2021 21:43)  T(F): 97.9 (11 Mar 2021 06:11), Max: 97.9 (11 Mar 2021 06:11)  HR: 55 (11 Mar 2021 06:11) (55 - 62)  BP: 105/48 (11 Mar 2021 06:11) (80/47 - 105/48)  BP(mean): --  ABP: --  ABP(mean): --  RR: 17 (11 Mar 2021 06:11) (17 - 17)  SpO2: 100% (11 Mar 2021 06:11) (99% - 100%)    I&O's Detail    10 Mar 2021 07:01  -  11 Mar 2021 07:00  --------------------------------------------------------  IN:    PRBCs (Packed Red Blood Cells): 300 mL  Total IN: 300 mL    OUT:  Total OUT: 0 mL    Total NET: 300 mL    PHYSICAL EXAM  General: A&O x0-1, NAD  HEENT: Normocephalic, atraumatic, EOMI, anicteric sclerae.  Respiratory: Breathing non-labored.  CVS: Regular rate and rhythm, 2+ edema lower extremities.   Abdomen: Soft, nondistended, nontender. No rebound, no guarding,   Neuro: no apparent focal deficits however difficult to assess given altered me      LABS    Culture - Blood (03.09.21 @ 08:07)   Gram Positive Cocci in Clusters    -  Staphylococcus epidermidis, Methicillin resistant: Detec    Specimen Source: .Blood Blood-Peripheral    Culture - Blood (03.09.21 @ 08:07)    Specimen Source: .Blood Blood-Peripheral    Culture Results:   No growth to date.        Culture - Urine (03.09.21 @ 10:09)  50,000 - 99,000 CFU/mL Pseudomonas aeruginosa    CBC Full  -  ( 10 Mar 2021 05:50 )  WBC Count : 50.84 K/uL  Hemoglobin : 6.3 g/dL  Hematocrit : 21.8 %  Platelet Count - Automated : 109 K/uL  Mean Cell Volume : 105.3 fL    Baseline labs from [facility] [place] on 1/16/21 (prior to discharge)  WBC: 19.2  Hb: 9  plts: 110    Venous Duplex US  Summary/Impressions:  Technically difficult and limited study.  No evidence of deep vein thrombosis in the visualized  right and left lower extremities. The bilateral calf veins  were not well visualized.    Summary/Impressions:  Technically difficult and limited study.  No evidence of deep vein thrombosis in the visualized  right and left upper extremities.

## 2021-03-12 VITALS
HEART RATE: 64 BPM | RESPIRATION RATE: 18 BRPM | SYSTOLIC BLOOD PRESSURE: 89 MMHG | OXYGEN SATURATION: 100 % | TEMPERATURE: 98 F | DIASTOLIC BLOOD PRESSURE: 44 MMHG

## 2021-03-12 LAB
-  AMPICILLIN: SIGNIFICANT CHANGE UP
-  CIPROFLOXACIN: SIGNIFICANT CHANGE UP
-  LEVOFLOXACIN: SIGNIFICANT CHANGE UP
-  NITROFURANTOIN: SIGNIFICANT CHANGE UP
-  TETRACYCLINE: SIGNIFICANT CHANGE UP
-  VANCOMYCIN: SIGNIFICANT CHANGE UP
CULTURE RESULTS: SIGNIFICANT CHANGE UP
METHOD TYPE: SIGNIFICANT CHANGE UP
ORGANISM # SPEC MICROSCOPIC CNT: SIGNIFICANT CHANGE UP
SPECIMEN SOURCE: SIGNIFICANT CHANGE UP

## 2021-03-12 PROCEDURE — 99239 HOSP IP/OBS DSCHRG MGMT >30: CPT

## 2021-03-12 RX ORDER — MIDODRINE HYDROCHLORIDE 2.5 MG/1
4 TABLET ORAL
Qty: 360 | Refills: 0
Start: 2021-03-12 | End: 2021-04-10

## 2021-03-12 RX ORDER — ALTEPLASE 100 MG
2 KIT INTRAVENOUS ONCE
Refills: 0 | Status: DISCONTINUED | OUTPATIENT
Start: 2021-03-12 | End: 2021-03-12

## 2021-03-12 RX ORDER — MIDODRINE HYDROCHLORIDE 2.5 MG/1
1 TABLET ORAL
Qty: 0 | Refills: 0 | DISCHARGE

## 2021-03-12 RX ADMIN — GABAPENTIN 300 MILLIGRAM(S): 400 CAPSULE ORAL at 05:29

## 2021-03-12 RX ADMIN — Medication 1 APPLICATION(S): at 06:09

## 2021-03-12 RX ADMIN — Medication 300 MILLIGRAM(S): at 06:09

## 2021-03-12 RX ADMIN — MEROPENEM 100 MILLIGRAM(S): 1 INJECTION INTRAVENOUS at 00:07

## 2021-03-12 RX ADMIN — MEROPENEM 100 MILLIGRAM(S): 1 INJECTION INTRAVENOUS at 05:28

## 2021-03-12 RX ADMIN — MIDODRINE HYDROCHLORIDE 40 MILLIGRAM(S): 2.5 TABLET ORAL at 05:30

## 2021-03-12 NOTE — PROGRESS NOTE ADULT - PROBLEM SELECTOR PLAN 9
DVT PPx: [facility]  Diet: Clears can consider speech and swallow eval.   Dispo: Home with hospice pending resolution of medical issues.
DVT PPx: [facility]  Diet: Clears can consider speech and swallow eval.   Dispo: Home with hospice
DVT PPx: [facility]  Diet: Clears can consider speech and swallow eval.   Dispo: Home with hospice

## 2021-03-12 NOTE — MEDICAL STUDENT PROGRESS NOTE(EDUCATION) - NS MD HP STUD ASPLAN ASSES FT
64 YO F on hospice care for metastatic renal cancer currently untreated BIB EMS for AMS and found to be hypotensive, hypothermic and anemic in the ED. CT abdomen and pelvis showed progressive metastatic disease. Serum  She has been receiving supportive care and abx for sepsis in the setting of gram positive bacteremia most likely 2/2 PICC infection. She is DNR/DNI and ongoing GOC discussions are being had with the family. 66 YO F on hospice care for metastatic renal cancer currently untreated BIB EMS for AMS and found to be hypotensive, hypothermic and anemic in the ED. CT abdomen and pelvis showed progressive metastatic disease. Serum  She has been receiving supportive care and abx for sepsis in the setting of gram positive bacteremia most likely 2/2 PICC infection. She is DNR/DNI and scheduled for home hospice care.

## 2021-03-12 NOTE — PROGRESS NOTE ADULT - PROBLEM SELECTOR PLAN 7
Patient seen and evaluated at bedside by MICU and palliative care with ongoing GOC discussion. Made DNR/DNI by daughter  -no pressors  -refer to GOC note 3/10 for overall plan and management
Patient seen and evaluated at bedside by MICU and palliative care with ongoing GOC discussion. Made DNR/DNI by daughter
Patient seen and evaluated at bedside by MICU and palliative care with ongoing GOC discussion. Made DNR/DNI by daughter  -no pressors  -refer to GOC note 3/10 for overall plan and management

## 2021-03-12 NOTE — PROVIDER CONTACT NOTE (OTHER) - DATE AND TIME:
10-Mar-2021 22:35
11-Mar-2021 06:15
12-Mar-2021 05:04
10-Mar-2021 21:40
10-Mar-2021 13:15
10-Mar-2021 22:45
10-Mar-2021 21:15
10-Mar-2021 22:00
11-Mar-2021 03:00
12-Mar-2021 07:02
11-Mar-2021 22:30

## 2021-03-12 NOTE — PROVIDER CONTACT NOTE (OTHER) - SITUATION
RT contacted, will do A-stick at 4am
not able to draw blood on patient for CBC
/44
chronic hernandez order needed as per [facility] dylon/venancio and Nurse educator Janae
follow up with RT respiratory on A-stick. respiratory unable to draw blood. attempted twice as per respiratory dylon.
not able to complete vancomycin IV med due to occluded IV line
not able to draw blood on patient for CBC
pt with low diastolic and systolic BP
not able to draw blood on patient for CBC
BP 89/50. rectal temp 94.8F
not able to draw blood on patient for CBC

## 2021-03-12 NOTE — MEDICAL STUDENT PROGRESS NOTE(EDUCATION) - NS MD HP STUD ASPLAN PLAN FT
# AMS 2/2 SEPSIS: Unknown source. On admission qSOFA =2, NEWS2 = 12. 2 blood cultures sent: one positive for methicillin resistant staph epididermis and urine culture positive for pseudomonas.  Family reports an increase in her fentanyl patch dose which may have contributed to her AMS. Urine culture positive for gram negative rods. Has been receiving Vancomycin IV, Cefepime IV and Metronidazole IV (now d/c). Spoke with Nurse at [facility] [place] who provided baseline labs obtained on 1/26/21 prior to discharge: WBC was 19.2.      DDX:  - most likely - 2/2 s. epi infection 2/2 PICC line infection - in place since January 1/20/21 and removed on 3/10/21.   - UTI: urine culture positive for pseudomonas. Indwelling hernandez cath since January and UA significant for few bacteria and leukocyte esterase. Hernandez was replaced in ED.   - Less likely PNA: CXR shows L sided pleural effusion v atelectasis but PNA cannot be ruled out.   - Less likely: Osteomylitis 2/2 sacral decubitus ulcer - has been evaluated by surgery and considered to be clean. Unlikely source.  - less Likely: abdominal/pelvic abscess: CT abdomen pelvis shows endometrial thickening suspicious for metastatic disease but no obvious abscess.     Plan  - continue empiric abx: IV Vancomycin (MRSA coverage), IV Meropenem (pseudomonas coverage) until able to d/c for hospice home care  - provide supportive care: O2 nasal cannula, IVF, heating blanket  - establishing new IV access via US. Should use left arm.    # HYPOTENSION   Found to be hypotensive in the ED via US. Systolic in 80s, diastolic in 50s. Normal systolic ranges . Venous Doppler US showed no evidence of DVT. Most likely 2/2 septic shock. In ED She received IVF and Midrodrine which lead to an improvement. PE notable for bilateral upper and lower extremity edema.   - c/w IVF  - c/w Midodrine 40 mg PO q8 hr    #ANEMIA - Improving   Confirmed to be anemic at baseline (Hb 9 on 1/26/21). Found to be anemic in ED with Hb 5.5 - most likely dilutional after IVF. Received 3 units of pRBCs which resulted in Hb 6.3 on 3/10 in AM. Received another transfusion in PM on 3/10, cbc still pending. Target Hb > 7. ddx: DIC vs Internal bleed vs anemia of chronic disease  - FU on CBC post transfusion  - order FOBT and iron studies to assess for internal bleed  - c/w bowel regimen: Miralax and senna (no BMs since admission)  - hold anticoagulants due to bleeding risk      #HYPOXIA  Upon admission to ED was found to be hypoxic on room air with O2 sat 80%. Given 6L nasal cannula which lead to O2 sat %.  CXR shows L sided pleural effusion v atelectasis but PNA cannot be ruled out. She is currently 100% on room air and does not appear to be in any respiratory distress. However, hypoperfusion makes this measurement unreliable. High risk of PE given metastatic breast cancer and bedbound status but Doppler US showed no evidence of DVT, making PE less likely.  Ddx: sepsis (most likely), PNA, L sided atelectasis, PE, hypoperfusion.  - continue nasal cannula 6 L PRN.  - hold off on ABG       # AMS 2/2 SEPSIS: Unknown source. On admission qSOFA =2, NEWS2 = 12. 2 blood cultures sent: one positive for methicillin resistant staph epididermis and urine culture positive for pseudomonas.  Family reports an increase in her fentanyl patch dose which may have contributed to her AMS. Urine culture positive for gram negative rods. Has been receiving Vancomycin IV, Cefepime IV and Metronidazole IV (now d/c). Spoke with Nurse at [facility] [place] who provided baseline labs obtained on 1/26/21 prior to discharge: WBC was 19.2.      DDX:  - most likely - 2/2 s. epi infection 2/2 PICC line infection - in place since January 1/20/21 and removed on 3/10/21.   - UTI: urine culture positive for pseudomonas. Indwelling hernandez cath since January and UA significant for few bacteria and leukocyte esterase. Hernandez was replaced in ED.   - Less likely PNA: CXR shows L sided pleural effusion v atelectasis but PNA cannot be ruled out.   - Less likely: Osteomylitis 2/2 sacral decubitus ulcer - has been evaluated by surgery and considered to be clean. Unlikely source.  - less Likely: abdominal/pelvic abscess: CT abdomen pelvis shows endometrial thickening suspicious for metastatic disease but no obvious abscess.     Plan  - flow cath ordered   - continue empiric abx: IV Vancomycin (MRSA coverage), IV Meropenem (pseudomonas coverage) until able to d/c for hospice home care  - provide supportive care: O2 nasal cannula, IVF, heating blanket      # HYPOTENSION   Found to be hypotensive in the ED via US. Systolic in 80s, diastolic in 50s. Normal systolic ranges . Venous Doppler US showed no evidence of DVT. Most likely 2/2 septic shock. In ED She received IVF and Midrodrine which lead to an improvement. PE notable for bilateral upper and lower extremity edema.   - c/w IVF  - c/w Midodrine 40 mg PO q8 hr    #ANEMIA - Improving   Confirmed to be anemic at baseline (Hb 9 on 1/26/21). Found to be anemic in ED with Hb 5.5 - most likely dilutional after IVF. Received 3 units of pRBCs which resulted in Hb 6.3 on 3/10 in AM. Received another transfusion in PM on 3/10, cbc still pending. Target Hb > 7. ddx: DIC vs Internal bleed vs anemia of chronic disease  - FU on CBC post transfusion  - order FOBT and iron studies to assess for internal bleed  - c/w bowel regimen: Miralax and senna (no BMs since admission)  - hold anticoagulants due to bleeding risk      #HYPOXIA  Upon admission to ED was found to be hypoxic on room air with O2 sat 80%. Given 6L nasal cannula which lead to O2 sat %.  CXR shows L sided pleural effusion v atelectasis but PNA cannot be ruled out. She is currently 100% on room air and does not appear to be in any respiratory distress. However, hypoperfusion makes this measurement unreliable. High risk of PE given metastatic breast cancer and bedbound status but Doppler US showed no evidence of DVT, making PE less likely.  Ddx: sepsis (most likely), PNA, L sided atelectasis, PE, hypoperfusion.  - continue nasal cannula 6 L PRN.  - hold off on ABG

## 2021-03-12 NOTE — PROGRESS NOTE ADULT - PROBLEM/PLAN-4
DISPLAY PLAN FREE TEXT
complains of pain/discomfort

## 2021-03-12 NOTE — PROGRESS NOTE ADULT - PROBLEM SELECTOR PLAN 4
KPS 20% needs assistance with all ADL's
AOx0-1 in the ED  likely in setting of sepsis.

## 2021-03-12 NOTE — PROGRESS NOTE ADULT - PROBLEM SELECTOR PLAN 1
found to have MR kevin epi bacteremia and pseudomonas UTI  -Sacral decubitus ulcer evaluated by general surgery unlikely to be source clean on debridement.   CT abd pelvis with endometrial thickening suspicious for metastatic disease no obvious abscess  - PICC removed. has been in place since admission to [facility] [place] 1/2021  -empiric vanc1g q12h and vanc level pre 4th dose Day 4 (3/9- )  -cefepime 2g q8h Day 4 (3/8 PM-3/11 )  -meropenem  Day 1/1 3/11.  -Ertapenem Day 1/1 3/10  -metronidazole 1/1 (3/9-3/10 ) found to have MR kevin epi bacteremia and pseudomonas UTI  -Sacral decubitus ulcer evaluated by general surgery unlikely to be source clean on debridement.   CT abd pelvis with endometrial thickening suspicious for metastatic disease no obvious abscess  - PICC removed. has been in place since admission to [facility] 1/2021  -empiric vanc1g q12h and vanc level pre 4th dose Day 4 (3/9- )  -cefepime 2g q8h then renally dosed and discontinued. Day 4/4 (3/8 PM-3/11 )  -meropenem 1000 q12h  Day 1 3/11-  -Ertapenem Day 1/1 3/10  -metronidazole 1/1 (3/9-3/10 )

## 2021-03-12 NOTE — PROGRESS NOTE ADULT - PROBLEM SELECTOR PLAN 2
Not currently on treatment, had been home with hospice  Does not appear to be in any distress  Would initiate Dilaudid 0.3 mg q 4 hours prn for mild mod severe pain and for dyspnea   CT with interval increase cclerotic and lytic mets to thoracolumbar spine and iliac crest,  renal soft tissue mass , endometrial thickening.
Presenting with hypotension improving w IVF in ED and midodrine  -IVF NS@100 3/9 PM for 1.5L   -Midodrine 40 TID  -attempting to keep SBP>90, BP at home around  SBP at home  -family at this time is still open to pressors with GOC ongoing
Presenting with hypotension improving w IVF in ED and midodrine  -IVF NS@100 3/9 PM for 1.5L   -Midodrine 40 TID  -attempting to keep SBP>90, BP at home around  SBP at home  -no pressors
Presenting with hypotension improving w IVF in ED and midodrine  -IVF NS@100 3/9 PM for 1.5L   -Midodrine 40 TID  -attempting to keep SBP>90, BP at home around  SBP at home  -no pressors

## 2021-03-12 NOTE — PROVIDER CONTACT NOTE (OTHER) - ASSESSMENT
pt lethagic

## 2021-03-12 NOTE — PROGRESS NOTE ADULT - PROBLEM SELECTOR PLAN 5
Per surgery- Large open sacral wound -- majority of wound with pink granulation tissue. Some areas with fibrinous and necrotic tissue debrided sharply at bedside. No crepitus or erythema palpated on surrounding buttocks. Exposed sacral bone.  -Can dress sacral wound with Dakins soaked wet to dry dressing. Recommend wound care consult for additional dressing change recommendations   -Wound care recs appreciated, implemented
Met with  Leonardo at bedside and son Manan via facetime.  Explained that patient continues to deteriorate due to the fact that the dying process.  Despite any medical interventions, patient is dying of her end stage cancer.  Medical interventions are prolonging the dying process and extending the suffering for the family and the patient.  It was decided by the family to take the patient home to die and reinstate [facility].    I spoke with the  : Josephine who will make the necessary referrals.
Per surgery- Large open sacral wound -- majority of wound with pink granulation tissue. Some areas with fibrinous and necrotic tissue debrided sharply at bedside. No crepitus or erythema palpated on surrounding buttocks. Exposed sacral bone.  -Can dress sacral wound with Dakins soaked wet to dry dressing. Recommend wound care consult for additional dressing change recommendations   -Wound care nurse consult placed.
Per surgery- Large open sacral wound -- majority of wound with pink granulation tissue. Some areas with fibrinous and necrotic tissue debrided sharply at bedside. No crepitus or erythema palpated on surrounding buttocks. Exposed sacral bone.  -Can dress sacral wound with Dakins soaked wet to dry dressing. Recommend wound care consult for additional dressing change recommendations   -Wound care recs appreciated, implemented

## 2021-03-12 NOTE — PROGRESS NOTE ADULT - PROBLEM SELECTOR PLAN 6
Metastatic breast cancer diagnosed 2 years prior sp lumpectomy per chart and chemotherapy per Dr. Rosario at [facility] [place]. Off chemotherapy and on hospice.  -gabapentin 300mg TID  -appreciate palliative recs for pain control, on dilaudid prn

## 2021-03-12 NOTE — PROGRESS NOTE ADULT - NUTRITIONAL ASSESSMENT
This patient has been assessed with a concern for Malnutrition and has been determined to have a diagnosis/diagnoses of Severe protein-calorie malnutrition.    This patient is being managed with:   Diet Clear Liquid-  Entered: Mar  9 2021  5:08PM    

## 2021-03-12 NOTE — PROVIDER CONTACT NOTE (OTHER) - NAME OF MD/NP/PA/DO NOTIFIED:
MD Mary Alice Mera
MD timur bhakta
dr. Krishna

## 2021-03-12 NOTE — PROVIDER CONTACT NOTE (OTHER) - REASON
RT contacted will do A-stick at 4
pt with low diastolic and systolic BP
not able to complete vancomycin IV med due to occluded IV line
not able to draw blood on patient for CBC
follow up with RT respiratory on A-stick
not able to draw blood on patient for CBC
BP 89/50. rectal temp 94.8F
not able to draw blood on patient for CBC
chronic hernandez order needed
/44
not able to draw blood on patient for CBC

## 2021-03-12 NOTE — PROGRESS NOTE ADULT - ASSESSMENT
64yo F hx metastatic breast CA not currently on tx (previous home hospice) presenting for altered mental status found to be septic from undetermined source now on antibiotics and supportive measures with ongoing GOC discussion. 
64yo F hx metastatic breast CA not currently on tx (previous home hospice) presenting for altered mental status found to be septic from MR kevin epi bacteremia and pseudomonas UTI now on antibiotics and supportive measures with the goal to stabilize for transfer to home hospice
66 Y/O WITH METASTATIC BREAST CANCER, ADMITTED FROM HOME FOR AMS , WEAKNESS, POOR PO INTAKE, FOUND TO BE PROFOUNDLY HYPOTENSIVE, SEPTIC CALLED FOR GOALS OF CARE 
66yo F hx metastatic breast CA not currently on tx (previous home hospice) presenting for altered mental status found to be septic from MR kevin epi bacteremia and pseudomonas UTI now on antibiotics and supportive measures with the goal to stabilize for transfer to home hospice

## 2021-03-12 NOTE — PROGRESS NOTE ADULT - ATTENDING COMMENTS
Patient was seen and examined personally by me. I have discussed the plan and reviewed the Resident's note and agree with the above physical exam findings including assessment and plan except as indicated below. Labs and imagining reviewed.     65F with severe sepsis, metabolic encephalopathy. Ucx growing pseudomonas, Bcx grew MRSE. PICC was removed. Hanks was exhanged. c/w vanc and cefepime. Clinically BP improved today. remain encephalopathic. per discussion with family at bedside. DNI/DNR, no further blood draws as it is difficult to obtain and causing patient significant discomfort. They would still like IV access for abx and treatments until patient goes home to hospice.
Patient was seen and examined personally by me. I have discussed the plan and reviewed the Resident's note and agree with the above physical exam findings including assessment and plan except as indicated below. Labs and imagining reviewed.     Patient is discharge to home hospice today. Arrow placed yesterday, but lost access overnight. discussed with , agrees to not replace IV as it was not used at home hospice in the past. Discussed abx with  who wishes for PO abx. patient's bacterial culture is resistant to PO abx, and treatment of this infection would not change patient's overall outcome. family agrees to no abx and focus on getting patient home and comfortable as patient wished.     d/c home today to home hospice.     55 minutes spent coordinating discharge
Patient was seen and examined personally by me. I have discussed the plan and reviewed the Resident's note and agree with the above physical exam findings including assessment and plan except as indicated below. Labs and imagining reviewed.     65F with metastatic breast ca, not receiving treatment, on home hospice presented with AMS. Acute encephalopathy due to sepsis, UTI/sacral DU/PICC infection. Ucx with Pseudomonas. Bcx pending. f/u sensitivities. c/w abx. Was hypoxic, now on RA. diffused edema/anasarca, doppler to rule-out DVT. Anemia s/p 3 UPRBC, not responding appropriately. concern for GIB. FOBT. avoid AC and AT. Hypotension. stable BP still on midodrine PO. Patient remain encephalopathic, wax and wean MS. c/t monitor. Palliative following for continue GOC discussion.

## 2021-03-12 NOTE — PROVIDER CONTACT NOTE (OTHER) - ACTION/TREATMENT ORDERED:
use thermal blanket. continue to monitor
No new order
no new orders at this time. will continue to monitor.
MD made aware. will input order.
paged MD again. could not reach MD
paged MD several times throughout shift. EMILIA Cintron and Kendrick notified.
will do A-stick on patient at bedside.
MD made aware. no new orders at this time.
will do A-stick on patient at bedside.
MD made aware
MD made aware. states he is currently in rapid and to escalate to someone.

## 2021-03-12 NOTE — PROGRESS NOTE ADULT - PROBLEM SELECTOR PLAN 3
DDx Pulmonary embolism iso malignancy v sepsis w pulmonary source or hypoperfusion   % on 6L nasal cannula in ED   -duplex b/l UE and LE to r/o DVT   -TTE for RV strain   -now on room air.
More lethargic today, advance disease

## 2021-03-12 NOTE — PROVIDER CONTACT NOTE (OTHER) - RECOMMENDATIONS
MD made aware. thermal blanket
MD made aware.
hernandez order/renewal required with "chronic hernandez" in description
md made aware. see patient. paged MD second time.
new line placement needed
md made aware
md made aware.
md made aware.
md made aware. see patient
md made aware. see patient.
md made aware. see patient.

## 2021-03-12 NOTE — PROGRESS NOTE ADULT - SUBJECTIVE AND OBJECTIVE BOX
SUBJECTIVE AND OBJECTIVE:  INTERVAL HPI/OVERNIGHT EVENTS:  Letharic , awake but minimally verbal.   Leonardo at the bedside     DNR on chart: Yes    Yes      Allergies    penicillins (Hives)    Intolerances    MEDICATIONS  (STANDING):  cefepime   IVPB 2000 milliGRAM(s) IV Intermittent every 8 hours  Dakins Solution - 1/2 Strength 1 Application(s) Topical two times a day  gabapentin 300 milliGRAM(s) Oral three times a day  magnesium sulfate  IVPB 2 Gram(s) IV Intermittent once  midodrine 40 milliGRAM(s) Oral every 8 hours  polyethylene glycol 3350 17 Gram(s) Oral daily  senna 2 Tablet(s) Oral at bedtime  vancomycin  IVPB      vancomycin  IVPB 1500 milliGRAM(s) IV Intermittent every 12 hours    MEDICATIONS  (PRN):      ITEMS UNCHECKED ARE NOT PRESENT    PRESENT SYMPTOMS: [ x]Unable to obtain due to poor mentation   Source if other than patient:  [ ]Family   [ ]Team     Pain:  [ ]yes [ ]no  QOL impact -   Location -                    Aggravating factors -  Quality -  Radiation -  Timing-  Severity (0-10 scale):  Minimal acceptable level (0-10 scale):     Dyspnea:                           [ ]Mild [ ]Moderate [ ]Severe  Anxiety:                             [ ]Mild [ ]Moderate [ ]Severe  Fatigue:                             [ ]Mild [ ]Moderate [ ]Severe  Nausea:                             [ ]Mild [ ]Moderate [ ]Severe  Loss of appetite:              [ ]Mild [ ]Moderate [ ]Severe  Constipation:                    [ ]Mild [ ]Moderate [ ]Severe    PAIN AD Score:	1-2  [email or web address] (Ctrl + left click to view)    Other Symptoms:  [ ]All other review of systems negative     Palliative Performance Status Version 2:   20      %      http://npcrc.org/files/news/palliative_performance_scale_ppsv2.pdf  PHYSICAL EXAM:  Vital Signs Last 24 Hrs  T(C): 36.5 (10 Mar 2021 13:15), Max: 36.5 (10 Mar 2021 13:15)  T(F): 97.7 (10 Mar 2021 13:15), Max: 97.7 (10 Mar 2021 13:15)  HR: 62 (10 Mar 2021 13:15) (62 - 89)  BP: 80/47 (10 Mar 2021 13:15) (70/50 - 89/55)  BP(mean): --  RR: 17 (10 Mar 2021 13:15) (14 - 17)  SpO2: 99% (10 Mar 2021 13:15) (99% - 100%) I&O's Summary     GENERAL:  [ ]Alert  [ ]Oriented x   [ x]Lethargic  [x ]Cachexia  [ ]Unarousable  [x ] Min Verbal  [ ]Non-Verbal  Behavioral:   [ ]Anxiety  [ ]Delirium [ ]Agitation [ ]Other  HEENT:  [ ]Normal   [ x]Dry mouth   [ ]ET Tube/Trach  [ ]Oral lesions  PULMONARY:   [ ]Clear [ ]Tachypnea  [ ]Audible excessive secretions   [ ]Rhonchi        [ ]Right [ ]Left [ ]Bilateral  [ ]Crackles        [ ]Right [ ]Left [ ]Bilateral  [ ]Wheezing     [ ]Right [ ]Left [ ]Bilateral  [ x]Diminished BS [ ] Right [ ]Left [ ]Bilateral  CARDIOVASCULAR:    [ ]Regular [x ]Irregular [ ]Tachy  [ ]Darshan [ ]Murmur [ ]Other  GASTROINTESTINAL:  [ ]Soft  [x ]Distended   [ x]+BS  [ ]Non tender [ ]Tender  [ ]PEG [ ]OGT/ NGT   Last BM:    GENITOURINARY:  [ ]Normal [x ]Incontinent   [ ]Oliguria/Anuria   [ ]Hanks  MUSCULOSKELETAL:   [ ]Normal   [ ]Weakness  [ x]Bed/Wheelchair bound [ ]Edema  NEUROLOGIC:   [ ]No focal deficits  [x ] Cognitive impairment  [ ] Dysphagia [ ]Dysarthria [ ] Paresis [ ]Other   SKIN:   [ ]Normal  [ ]Rash   [x]Pressure ulcer(s) [x ]y [ ]n present on admission    CRITICAL CARE:  [ x]Shock Present  [ ]Septic [ ]Cardiogenic [ ]Neurologic [ ]Hypovolemic  [ ]Vasopressors [ ]Inotropes  [ ]Respiratory failure present [ ]Mechanical Ventilation [ ]Non-invasive ventilatory support [ ]High-Flow  [ ]Acute  [ ]Chronic [ ]Hypoxic  [ ]Hypercarbic [ ]Other  [ ]Other organ failure     LABS:                        6.3    50.84 )-----------( 109      ( 10 Mar 2021 05:50 )             21.8   03-10    137  |  103  |  18  ----------------------------<  71  3.4<L>   |  21<L>  |  1.38<H>    Ca    8.2<L>      10 Mar 2021 05:50  Phos  3.8     -10  Mg     1.4     -10    TPro  5.3<L>  /  Alb  1.7<L>  /  TBili  1.4<H>  /  DBili  x   /  AST  20  /  ALT  9   /  AlkPhos  223<H>    PT/INR - ( 08 Mar 2021 21:12 )   PT: 20.9 sec;   INR: 1.89 ratio         PTT - ( 08 Mar 2021 21:12 )  PTT:62.5 sec    Urinalysis Basic - ( 09 Mar 2021 01:34 )    Color: Yellow / Appearance: Slightly Turbid / S.021 / pH: x  Gluc: x / Ketone: Negative  / Bili: Negative / Urobili: 6 mg/dL   Blood: x / Protein: 100 mg/dL / Nitrite: Negative   Leuk Esterase: Small / RBC: >10 /HPF / WBC 5-10 /HPF   Sq Epi: x / Non Sq Epi: 0-5 /HPF / Bacteria: Few      RADIOLOGY & ADDITIONAL STUDIES:    Protein Calorie Malnutrition Present: [ ]mild [ ]moderate [ ]severe [ ]underweight [ ]morbid obesity  https://www.andeal.org/vault/2440/web/files/ONC/Table_Clinical%20Characteristics%20to%20Document%20Malnutrition-White%20JV%20et%20al%2020.pdf    Height (cm): 167.6 (03-10-21 @ 00:28)  Weight (kg): 101.1 (03-10-21 @ 00:28)  BMI (kg/m2): 36 (03-10-21 @ 00:28)    [ ]PPSV2 < or = 30%  [ ]significant weight loss [ ]poor nutritional intake [ ]anasarca   Albumin, Serum: 1.7 g/dL (21 @ 21:12)   [ ]Artificial Nutrition    REFERRALS:   [ ]Chaplaincy  [ ]Hospice  [ ]Child Life  [ ]Social Work  [ ]Case management [ ]Holistic Therapy     Goals of Care Document:    
  Beltran Mera MD  Internal Medicine  Pager #[phone]    Patient is a 65y old  Female who presents with a chief complaint of Sepsis (09 Mar 2021 13:57)      SUBJECTIVE / OVERNIGHT EVENTS:    ON: hgb 6.3 after 3u PRBC additional unit ordered and post-transfusion cbc    Remaining hypotensive ON with no acute changes in mentation     No acute distress complaints     ADDITIONAL REVIEW OF SYSTEMS:    CONSTITUTIONAL: No weakness, fevers or chills  EYES/ENT: No visual changes;  No vertigo or throat pain.  NECK: No pain or stiffness.  RESPIRATORY: No cough, wheezing, hemoptysis; No shortness of breath.  CARDIOVASCULAR: No chest pain or palpitations  GASTROINTESTINAL: No abdominal pain. No nausea, vomiting, diarrhea, constipation, or melena.  GENITOURINARY: No dysuria, frequency or hematuria  NEUROLOGICAL: No numbness or weakness  SKIN: No itching, burning, rashes, or lesions   All other review of systems is negative unless indicated above.    MEDICATIONS  (STANDING):  cefepime   IVPB 2000 milliGRAM(s) IV Intermittent every 8 hours  Dakins Solution - 1/2 Strength 1 Application(s) Topical two times a day  gabapentin 300 milliGRAM(s) Oral three times a day  magnesium sulfate  IVPB 2 Gram(s) IV Intermittent once  methadone    Tablet 5 milliGRAM(s) Oral two times a day  metroNIDAZOLE  IVPB 500 milliGRAM(s) IV Intermittent every 8 hours  midodrine 40 milliGRAM(s) Oral every 8 hours  vancomycin  IVPB      vancomycin  IVPB 1500 milliGRAM(s) IV Intermittent every 12 hours    MEDICATIONS  (PRN):      CAPILLARY BLOOD GLUCOSE        I&O's Summary      PHYSICAL EXAM:  Vital Signs Last 24 Hrs  T(C): 36.3 (10 Mar 2021 06:29), Max: 36.4 (09 Mar 2021 18:46)  T(F): 97.3 (10 Mar 2021 06:29), Max: 97.5 (09 Mar 2021 18:46)  HR: 89 (10 Mar 2021 06:29) (70 - 89)  BP: 81/53 (10 Mar 2021 06:29) (66/51 - 116/81)  BP(mean): --  RR: 16 (10 Mar 2021 06:29) (12 - 23)  SpO2: 100% (10 Mar 2021 06:29) (96% - 100%)    General: A&O x0-1, NAD  HEENT: Normocephalic, atraumatic, EOMI, anicteric sclerae.  Respiratory: Breathing non-labored.  CVS: Regular rate and rhythm, 2+ edema lower extremities.   Abdomen: Soft, nondistended, nontender. No rebound, no guarding,   Neuro: no apparent focal deficits however difficult to assess given altered mental status.    LABS:                        6.3    50.84 )-----------( 109      ( 10 Mar 2021 05:50 )             21.8     03-10    137  |  103  |  18  ----------------------------<  71  3.4<L>   |  21<L>  |  1.38<H>    Ca    8.2<L>      10 Mar 2021 05:50  Phos  3.8     -10  Mg     1.4     -10    TPro  5.3<L>  /  Alb  1.7<L>  /  TBili  1.4<H>  /  DBili  x   /  AST  20  /  ALT  9   /  AlkPhos  223<H>  03-08    PT/INR - ( 08 Mar 2021 21:12 )   PT: 20.9 sec;   INR: 1.89 ratio         PTT - ( 08 Mar 2021 21:12 )  PTT:62.5 sec      Urinalysis Basic - ( 09 Mar 2021 01:34 )    Color: Yellow / Appearance: Slightly Turbid / S.021 / pH: x  Gluc: x / Ketone: Negative  / Bili: Negative / Urobili: 6 mg/dL   Blood: x / Protein: 100 mg/dL / Nitrite: Negative   Leuk Esterase: Small / RBC: >10 /HPF / WBC 5-10 /HPF   Sq Epi: x / Non Sq Epi: 0-5 /HPF / Bacteria: Few          RADIOLOGY & ADDITIONAL TESTS:  Results Reviewed:   Imaging Personally Reviewed:  Electrocardiogram Personally Reviewed:    COORDINATION OF CARE:  Care Discussed with Consultants/Other Providers [Y/N]:   Prior or Outpatient Records Reviewed [Y/N]:     
Sanya Krishna, PGY-2  Internal Medicine  Pager #[phone]    Patient is a 65y old  Female who presents with a chief complaint of Sepsis (09 Mar 2021 13:57)      SUBJECTIVE / OVERNIGHT EVENTS:  Overnight difficulty with obtaining blood work. A-stick attempts by multiple staff members unsuccesful. Patient's hemodynamics improved with no signs of acute bleeding    ADDITIONAL REVIEW OF SYSTEMS:    CONSTITUTIONAL: No weakness, fevers or chills  EYES/ENT: No visual changes;  No vertigo or throat pain.  NECK: No pain or stiffness.  RESPIRATORY: No cough, wheezing, hemoptysis; No shortness of breath.  CARDIOVASCULAR: No chest pain or palpitations  GASTROINTESTINAL: No abdominal pain. No nausea, vomiting, diarrhea, constipation, or melena.  GENITOURINARY: No dysuria, frequency or hematuria  NEUROLOGICAL: No numbness or weakness  SKIN: No itching, burning, rashes, or lesions   All other review of systems is negative unless indicated above.    MEDICATIONS  (STANDING):  cefepime   IVPB 1000 milliGRAM(s) IV Intermittent every 8 hours  Dakins Solution - 1/2 Strength 1 Application(s) Topical two times a day  gabapentin 300 milliGRAM(s) Oral three times a day  midodrine 40 milliGRAM(s) Oral every 8 hours  polyethylene glycol 3350 17 Gram(s) Oral daily  senna 2 Tablet(s) Oral at bedtime  vancomycin  IVPB 1500 milliGRAM(s) IV Intermittent every 12 hours    MEDICATIONS  (PRN):  HYDROmorphone  Injectable 0.3 milliGRAM(s) IV Push every 4 hours PRN Severe Pain (7 - 10)      CAPILLARY BLOOD GLUCOSE    I&O's Summary    10 Mar 2021 07:01  -  11 Mar 2021 07:00  --------------------------------------------------------  IN: 300 mL / OUT: 0 mL / NET: 300 mL      PHYSICAL EXAM:    Vital Signs Last 24 Hrs  T(C): 36.6 (11 Mar 2021 06:11), Max: 36.6 (10 Mar 2021 21:43)  T(F): 97.9 (11 Mar 2021 06:11), Max: 97.9 (11 Mar 2021 06:11)  HR: 55 (11 Mar 2021 06:11) (55 - 62)  BP: 105/48 (11 Mar 2021 06:11) (80/47 - 105/48)  RR: 17 (11 Mar 2021 06:11) (17 - 17)  SpO2: 100% (11 Mar 2021 06:11) (99% - 100%)    General: A&O x0-1, NAD  HEENT: Normocephalic, atraumatic, EOMI, anicteric sclerae.  Respiratory: Breathing non-labored.  CVS: Regular rate and rhythm, 2+ edema lower extremities.   Abdomen: Soft, nondistended, nontender. No rebound, no guarding,   Neuro: no apparent focal deficits however difficult to assess given altered mental status.    LABS:                        6.3    50.84 )-----------( 109      ( 10 Mar 2021 05:50 )             21.8     03-10    137  |  103  |  18  ----------------------------<  71  3.4<L>   |  21<L>  |  1.38<H>    Ca    8.2<L>      10 Mar 2021 05:50  Phos  3.8     03-10  Mg     1.4     03-10    TPro  5.3<L>  /  Alb  1.7<L>  /  TBili  1.4<H>  /  DBili  x   /  AST  20  /  ALT  9   /  AlkPhos  223<H>  03-08    PT/INR - ( 08 Mar 2021 21:12 )   PT: 20.9 sec;   INR: 1.89 ratio         PTT - ( 08 Mar 2021 21:12 )  PTT:62.5 sec      Urinalysis Basic - ( 09 Mar 2021 01:34 )    Color: Yellow / Appearance: Slightly Turbid / S.021 / pH: x  Gluc: x / Ketone: Negative  / Bili: Negative / Urobili: 6 mg/dL   Blood: x / Protein: 100 mg/dL / Nitrite: Negative   Leuk Esterase: Small / RBC: >10 /HPF / WBC 5-10 /HPF   Sq Epi: x / Non Sq Epi: 0-5 /HPF / Bacteria: Few          RADIOLOGY & ADDITIONAL TESTS:  Results Reviewed:   Imaging Personally Reviewed:  Electrocardiogram Personally Reviewed:    COORDINATION OF CARE:  Care Discussed with Consultants/Other Providers [Y/N]:   Prior or Outpatient Records Reviewed [Y/N]:     
  Beltran Mera MD  Internal Medicine  Pager #[phone]    Patient is a 65y old  Female who presents with a chief complaint of Sepsis (11 Mar 2021 07:23)      SUBJECTIVE / OVERNIGHT EVENTS:    PICC not functioning properly per ON RN. No other vascular access.     Set to leave with [employer] at home 3/12.     Otherwise no acute issues.     ADDITIONAL REVIEW OF SYSTEMS:    Unable to assess due to altered mental status    MEDICATIONS  (STANDING):  Dakins Solution - 1/2 Strength 1 Application(s) Topical two times a day  gabapentin 300 milliGRAM(s) Oral three times a day  meropenem  IVPB      meropenem  IVPB 1000 milliGRAM(s) IV Intermittent every 12 hours  midodrine 40 milliGRAM(s) Oral every 8 hours  polyethylene glycol 3350 17 Gram(s) Oral daily  senna 2 Tablet(s) Oral at bedtime  vancomycin  IVPB 1500 milliGRAM(s) IV Intermittent every 12 hours    MEDICATIONS  (PRN):  HYDROmorphone  Injectable 0.3 milliGRAM(s) IV Push every 4 hours PRN Severe Pain (7 - 10)      CAPILLARY BLOOD GLUCOSE        I&O's Summary    11 Mar 2021 07:01  -  12 Mar 2021 07:00  --------------------------------------------------------  IN: 50 mL / OUT: 0 mL / NET: 50 mL        PHYSICAL EXAM:  Vital Signs Last 24 Hrs  T(C): 36.6 (12 Mar 2021 05:16), Max: 36.6 (12 Mar 2021 05:16)  T(F): 97.8 (12 Mar 2021 05:16), Max: 97.8 (12 Mar 2021 05:16)  HR: 64 (12 Mar 2021 05:16) (62 - 64)  BP: 89/44 (12 Mar 2021 05:16) (77/45 - 89/50)  BP(mean): --  RR: 18 (12 Mar 2021 05:16) (17 - 18)  SpO2: 100% (12 Mar 2021 05:16) (97% - 100%)    General: A&O x0-1, NAD  HEENT: Normocephalic, atraumatic, EOMI, anicteric sclerae.  Respiratory: Breathing non-labored.  CVS: Regular rate and rhythm, 2+ edema lower extremities.   Abdomen: Soft, nondistended, nontender. No rebound, no guarding,   Neuro: no apparent focal deficits however difficult to assess given altered mental status.    LABS:        No labs 3/12            Culture - Urine (collected 09 Mar 2021 10:09)  Source: .Urine Clean Catch (Midstream)  Preliminary Report (11 Mar 2021 12:33):    50,000 - 99,000 CFU/mL Pseudomonas aeruginosa    10,000 - 49,000 CFU/mL Enterococcus faecalis Susceptibility to follow.  Organism: Pseudomonas aeruginosa (11 Mar 2021 09:20)  Organism: Pseudomonas aeruginosa (11 Mar 2021 09:20)        RADIOLOGY & ADDITIONAL TESTS:  Results Reviewed:   Imaging Personally Reviewed:  Electrocardiogram Personally Reviewed:    COORDINATION OF CARE:  Care Discussed with Consultants/Other Providers [Y/N]:   Prior or Outpatient Records Reviewed [Y/N]:

## 2021-03-12 NOTE — MEDICAL STUDENT PROGRESS NOTE(EDUCATION) - SUBJECTIVE AND OBJECTIVE BOX
SUBJECTIVE  Per RN, PICC line not working. Still no IV access. Hypothermic on blanket.    MEDICATIONS  (STANDING):  Dakins Solution - 1/2 Strength 1 Application(s) Topical two times a day  gabapentin 300 milliGRAM(s) Oral three times a day  meropenem  IVPB      meropenem  IVPB 1000 milliGRAM(s) IV Intermittent every 12 hours  midodrine 40 milliGRAM(s) Oral every 8 hours  polyethylene glycol 3350 17 Gram(s) Oral daily  senna 2 Tablet(s) Oral at bedtime  vancomycin  IVPB 1500 milliGRAM(s) IV Intermittent every 12 hours    MEDICATIONS  (PRN):  HYDROmorphone  Injectable 0.3 milliGRAM(s) IV Push every 4 hours PRN Severe Pain (7 - 10)    ICU Vital Signs Last 24 Hrs  T(C): 36.6 (12 Mar 2021 05:16), Max: 36.6 (12 Mar 2021 05:16)  T(F): 97.8 (12 Mar 2021 05:16), Max: 97.8 (12 Mar 2021 05:16)  HR: 64 (12 Mar 2021 05:16) (62 - 64)  BP: 89/44 (12 Mar 2021 05:16) (77/45 - 89/50)  BP(mean): --  ABP: --  ABP(mean): --  RR: 18 (12 Mar 2021 05:16) (17 - 18)  SpO2: 100% (12 Mar 2021 05:16) (97% - 100%)    I&O's Detail    11 Mar 2021 07:01  -  12 Mar 2021 07:00  --------------------------------------------------------  IN:    IV PiggyBack: 50 mL  Total IN: 50 mL    OUT:  Total OUT: 0 mL    Total NET: 50 mL    PHYSICAL EXAM  General: A&O x0-1, NAD  HEENT: Normocephalic, atraumatic, EOMI, anicteric sclerae.  Respiratory: Breathing non-labored.  CVS: Regular rate and rhythm, 2+ edema lower extremities.   Abdomen: Soft, nondistended, nontender. No rebound, no guarding,   Neuro: no apparent focal deficits however difficult to assess given altered mental status    LABS  unable to obtain due to lack of access         SUBJECTIVE  Per RN, PICC line not working. Hypothermic on blanket. Order was put in for cath flow. Has been accepted to [facility]. Transportation scheduled for today at 10 am.     MEDICATIONS  (STANDING):  Dakins Solution - 1/2 Strength 1 Application(s) Topical two times a day  gabapentin 300 milliGRAM(s) Oral three times a day  meropenem  IVPB      meropenem  IVPB 1000 milliGRAM(s) IV Intermittent every 12 hours  midodrine 40 milliGRAM(s) Oral every 8 hours  polyethylene glycol 3350 17 Gram(s) Oral daily  senna 2 Tablet(s) Oral at bedtime  vancomycin  IVPB 1500 milliGRAM(s) IV Intermittent every 12 hours    MEDICATIONS  (PRN):  HYDROmorphone  Injectable 0.3 milliGRAM(s) IV Push every 4 hours PRN Severe Pain (7 - 10)    ICU Vital Signs Last 24 Hrs  T(C): 36.6 (12 Mar 2021 05:16), Max: 36.6 (12 Mar 2021 05:16)  T(F): 97.8 (12 Mar 2021 05:16), Max: 97.8 (12 Mar 2021 05:16)  HR: 64 (12 Mar 2021 05:16) (62 - 64)  BP: 89/44 (12 Mar 2021 05:16) (77/45 - 89/50)  BP(mean): --  ABP: --  ABP(mean): --  RR: 18 (12 Mar 2021 05:16) (17 - 18)  SpO2: 100% (12 Mar 2021 05:16) (97% - 100%)    I&O's Detail    11 Mar 2021 07:01  -  12 Mar 2021 07:00  --------------------------------------------------------  IN:    IV PiggyBack: 50 mL  Total IN: 50 mL    OUT:  Total OUT: 0 mL    Total NET: 50 mL    PHYSICAL EXAM  General: NAD, awake but nonresponsive to most simple commands, NAD.   HEENT: Normocephalic, atraumatic, anicteric sclerae. EOM not intact. L pupil reactive to light.  Respiratory: Breathing non-labored.  CVS: Regular rate and rhythm, 2+ edema lower extremities.   Abdomen: Soft, nondistended, nontender. No rebound, no guarding,   Neuro: able to blink in response to command.     LABS  unable to obtain due to lack of access

## 2021-03-14 LAB
CULTURE RESULTS: SIGNIFICANT CHANGE UP
SPECIMEN SOURCE: SIGNIFICANT CHANGE UP

## 2022-01-18 NOTE — H&P PST ADULT - SKIN/BREAST
[facility] Medicine  Progress Note    Patient Name: Adryan Neff  MRN: [id number]  Patient Class: IP- Inpatient   Admission Date: 1/5/2022  Length of Stay: 13 days  Attending Physician: Dottie Kaufman MD  Primary Care Provider: Soren Rice MD        Subjective:     Principal Problem:Gangrene of left foot        HPI:  This is a 55 y.o.male patient, with a PMHx of ESRD on hemodialysis, CAD, HTN, and DM, presenting to the ED for further evaluation of left foot pain and black discoloration that began 3 days ago. Patient states these symptoms have been ongoing for the last month. He reports being referred to a specialist but denies ever following up. He has been putting some otc medication on the foot that he believes has turned his foot more black. No trauma or injury. Patient reports associated chills. Patient states applying a creme to the foot and noticed his discoloration worsened. Patient reports he was last dialyzed yesterday. Patient denies any fever, shortness of breath, chest pain, neck pain, back pain, abdominal pain, rash, headaches, congestion, rhinorrhea, cough, sore throat, ear pain, eye pain, blurred vision, nausea, vomiting, diarrhea, dysuria, or any other associated symptoms.    Per chart review, he had an arterial u/s 12/15.   Impression:   1. High-grade severe stenosis right popliteal artery with occlusion anterior tibial artery which is reconstituted at level of DPA via collaterals.  2. Occlusion of left deep femoral artery and peroneal artery.    Cardiology has been planning to see him for revascularization options. Last podiatry visit 12/13 with Stable dry gangrene L 2nd toe and medial L 5th toe. Dorsalis pedis and posterior tibial pulses are diminished Bilaterally. Toes are cool to touch. Feet are warm proximally.There is decreased digital hair. Skin is atrophic, slightly hyperpigmented, and mildly edematous     In the ED, he was found to have worsened gangrene and  a cool extremity. Vascular surgery was consulted. He was also COVID +. Says he got a booster shot in November sometime.      Overview/Hospital Course:  Patient admitted to the hospital for eval and treatment of L foot gangrene.  ID, Ortho and Vasc consulted. Nephrology was also consulted for chronic dialysis. Patient started on broad spectrum antibiotics, and had several toes on L foot amputated by Vascular surgery.     The patient's AVF had recurrent malfunction. IR attempts at intervention without success. THDC placed for dialysis.    Pt became increasingly delirious and combative. He refused angiogram with Vascular. Psychiatry evaluated, felt that he was not able to make his own medical decisions. At this point Dr. Soni felt the patient would be a poor candidate for vascular intervention, recommended AKA. ID in agreement. Ortho consulted for AKA, daughter/POA in agreement, date of procedure TBD.   Will have AKA today.      Interval History: No events overnight. Complains of leg pain    Review of Systems   Constitutional: Negative for chills and fever.   Respiratory: Negative for cough and shortness of breath.    Cardiovascular: Negative for chest pain and leg swelling.   Gastrointestinal: Negative for abdominal distention and abdominal pain.     Objective:     Vital Signs (Most Recent):  Temp: 99.5 °F (37.5 °C) (01/18/22 1143)  Pulse: 78 (01/18/22 1143)  Resp: 18 (01/18/22 1143)  BP: (!) 115/55 (01/18/22 1143)  SpO2: 99 % (01/18/22 1143) Vital Signs (24h Range):  Temp:  [98 °F (36.7 °C)-100.9 °F (38.3 °C)] 99.5 °F (37.5 °C)  Pulse:  [] 78  Resp:  [18-20] 18  SpO2:  [98 %-100 %] 99 %  BP: (104-143)/(54-72) 115/55     Weight: 64.3 kg (141 lb 12.1 oz)  Body mass index is 20.34 kg/m².    Intake/Output Summary (Last 24 hours) at 1/18/2022 1155  Last data filed at 1/17/2022 1254  Gross per 24 hour   Intake 700 ml   Output 42434 ml   Net -42157 ml      Physical Exam  Constitutional:       General: He is not in  acute distress.     Appearance: He is ill-appearing. He is not toxic-appearing or diaphoretic.   Cardiovascular:      Rate and Rhythm: Normal rate and regular rhythm.      Heart sounds: No murmur heard.  No gallop.    Pulmonary:      Effort: Pulmonary effort is normal. No respiratory distress.      Breath sounds: Normal breath sounds. No wheezing.   Abdominal:      General: Bowel sounds are normal. There is no distension.      Palpations: Abdomen is soft.      Tenderness: There is no abdominal tenderness.   Skin:     Comments: L foot s/p multiple amputated digits, necrotic tissue at site of amputation         Significant Labs: All pertinent labs within the past 24 hours have been reviewed.    Significant Imaging: I have reviewed all pertinent imaging results/findings within the past 24 hours.      Assessment/Plan:      * Gangrene of left foot  Presents with gangrenous L foot in the setting of severe PAD. Underwent left 2-5 metatarsal amputations w/ washout and debridement w/ Vascular surgery. Initial plan for angiogram w/ intervention, however pt refused the procedure. Given the low initial probability of wound healing and infectious clearance even with vascular intervention and lack of patient ability to adhere to medical treatment, Dr. Soni recommended AKA. Ortho consulted.   - family in agreement with AKA, date TBD  - antibiotics per ID.  Will have AKA today.      Encephalopathy, metabolic  Waxing and waning confusion likely delirium secondary to ischemic foot w/ possible superimposed infection and uremia from missing dialysis. Psychiatry deemed patient not to have capacity to make medical decisions. Overall orientation improved after starting low dose seroquel.  - psychiatry consulted, appreciate recs  - tx of underlying issues as noted elsewhere  - continue seroquel  - delirium precautions    COVID-19 in immunocompromised patient  HD patient + for COVID 19. Not hypoxic or symptomatic. Supportive care prn.        ESRD on hemodialysis  Nephrology consulted to resume routine HD. Fistula malfunctioning despite IR attempts at declot, THDC placed for alternative access  - nephrology following  - needs vascular vs IR fix of fistula    Chronic diastolic heart failure  December 2021 echo reviewed. Stable.       Essential (primary) hypertension  Titrate to goal. Pt intermittently refuses po medications, requiring IV meds      Controlled type 2 diabetes mellitus with chronic kidney disease on chronic dialysis, with long-term current use of insulin  Pt refused insulin, has intermittent PO intake.  - sliding scale insulin for now      VTE Risk Mitigation (From admission, onward)         Ordered     heparin (porcine) injection 5,000 Units  As needed (PRN)         01/14/22 1716     IP VTE HIGH RISK PATIENT  Once         01/05/22 1404     Place sequential compression device  Until discontinued         01/05/22 1404                Discharge Planning   ENIO:      Code Status: Full Code   Is the patient medically ready for discharge?:     Reason for patient still in hospital (select all that apply): Patient trending condition  Discharge Plan A: Skilled Nursing Facility                  Dottie Kaufman MD  Department of Hospital Medicine   [facility]     negative

## 2022-08-05 NOTE — PATIENT PROFILE ADULT - HAS THE PATIENT EXPERIENCED ANY OF THE FOLLOWING WITHIN THE WEEK PRIOR TO ADMISSION?
[Dear  ___] : Dear  [unfilled], [Consult Letter:] : I had the pleasure of evaluating your patient, [unfilled]. [Please see my note below.] : Please see my note below. [Consult Closing:] : Thank you very much for allowing me to participate in the care of this patient.  If you have any questions, please do not hesitate to contact me. [Sincerely,] : Sincerely, [FreeTextEntry2] : MICAH BELTRAN MD [FreeTextEntry3] : Ronnie Scott MD\par Director, Surgical Research\par Division of Pediatric, General, Thoracic and Endoscopic Surgery\alanna Lamas [facility]  no

## 2023-02-05 NOTE — PATIENT PROFILE ADULT - INFLUENZA IMMUNIZATION DATE (APPROXIMATE)
Pt IV removed x2 without complication. Monitor removed, cleaned and returned. RN went over discharge instructions in detail. Questions answered. Pt was given work excuse. 01-Sep-2018

## 2023-05-01 NOTE — DIETITIAN INITIAL EVALUATION ADULT. - PROBLEM SELECTOR PLAN 2
Presenting with hypotension improving w IVF in ED and midodrine  -Midodrine 30 TID  -IVF NS@100 3/9 PM for 1.5L   -keep SBP>90, BP at home around  SBP at home  -family at this time is still open to pressors with GOC ongoing PAST SURGICAL HISTORY:  History of gynecologic surgery     History of tonsillectomy     S/P cholecystectomy

## 2023-11-06 NOTE — OCCUPATIONAL THERAPY INITIAL EVALUATION ADULT - LIGHT TOUCH SENSATION, LUE, REHAB EVAL
within normal limits Tissue Cultured Epidermal Autograft Text: Because of the size and depth of the defect and to facilitate healing by granulation, a tissue-cultured epidermal autograft was planned.  After prep and local anesthesia, Xenograft material was trimmed to fi the defect and secured circumferentially.

## 2024-09-09 NOTE — PATIENT PROFILE ADULT - HAS THE PATIENT RECEIVED THE INFLUENZA VACCINE THIS SEASON?
Last Visit: 01-  Next Visit: 10-    PDMP reviewed; no aberrant behavior identified, prescription authorized.    Last dispensed/picked up from pharmacy: Prescribed: 1/10/2024  Dispensed: 3/4/2024  Sold: 3/9/2024     Date due for next refill (30 days from last dispense/ date): Provider to determine    Does patient have an active controlled substance agreement on file? No, please describe. Agreement needed    Last labs:   GOT/AST (Units/L)   Date Value   07/05/2023 12     GPT/ALT (Units/L)   Date Value   07/05/2023 21     Creatinine (mg/dL)   Date Value   07/07/2023 0.76         Forwarded to prescribing physician for completion.     
yes...

## 2024-10-24 NOTE — PATIENT PROFILE ADULT - NSPROGENANESREACTION_GEN_A_NUR
What Type Of Note Output Would You Prefer (Optional)?: Standard Output What Is The Reason For Today's Visit?: Full Body Skin Examination What Is The Reason For Today's Visit? (Being Monitored For X): the development of new lesions How Severe Are Your Spot(S)?: moderate no previous reaction